# Patient Record
Sex: FEMALE | Race: WHITE | Employment: OTHER | URBAN - METROPOLITAN AREA
[De-identification: names, ages, dates, MRNs, and addresses within clinical notes are randomized per-mention and may not be internally consistent; named-entity substitution may affect disease eponyms.]

---

## 2017-01-23 DIAGNOSIS — M19.90 ARTHRITIS: ICD-10-CM

## 2017-01-23 DIAGNOSIS — F41.9 ANXIETY: ICD-10-CM

## 2017-01-23 RX ORDER — LORAZEPAM 1 MG/1
1 TABLET ORAL EVERY 8 HOURS PRN
Qty: 60 TABLET | Refills: 0 | Status: SHIPPED | OUTPATIENT
Start: 2017-01-23 | End: 2017-02-28 | Stop reason: SDUPTHER

## 2017-01-23 RX ORDER — TRAMADOL HYDROCHLORIDE 50 MG/1
50 TABLET ORAL 2 TIMES DAILY PRN
Qty: 60 TABLET | Refills: 0 | Status: SHIPPED | OUTPATIENT
Start: 2017-01-23 | End: 2017-02-28 | Stop reason: SDUPTHER

## 2017-02-05 ENCOUNTER — HOSPITAL ENCOUNTER (OUTPATIENT)
Age: 77
Setting detail: OBSERVATION
Discharge: HOME | End: 2017-02-06
Attending: EMERGENCY MEDICINE | Admitting: INTERNAL MEDICINE
Payer: MEDICARE

## 2017-02-05 ENCOUNTER — APPOINTMENT (OUTPATIENT)
Dept: CT IMAGING | Age: 77
End: 2017-02-05
Payer: MEDICARE

## 2017-02-05 ENCOUNTER — CARE COORDINATOR VISIT (OUTPATIENT)
Dept: CASE MANAGEMENT | Age: 77
End: 2017-02-05

## 2017-02-05 DIAGNOSIS — K52.9 COLITIS: Primary | ICD-10-CM

## 2017-02-05 LAB
ABSOLUTE EOS #: 0 K/UL (ref 0–0.4)
ABSOLUTE LYMPH #: 1.75 K/UL (ref 1–4.8)
ABSOLUTE MONO #: 0.88 K/UL (ref 0.1–1.3)
ALBUMIN SERPL-MCNC: 4.2 G/DL (ref 3.5–5.2)
ALBUMIN/GLOBULIN RATIO: ABNORMAL (ref 1–2.5)
ALP BLD-CCNC: 59 U/L (ref 35–104)
ALT SERPL-CCNC: 11 U/L (ref 5–33)
ANION GAP SERPL CALCULATED.3IONS-SCNC: 13 MMOL/L (ref 9–17)
AST SERPL-CCNC: 14 U/L
BASOPHILS # BLD: 0 % (ref 0–2)
BASOPHILS ABSOLUTE: 0 K/UL (ref 0–0.2)
BILIRUB SERPL-MCNC: 0.63 MG/DL (ref 0.3–1.2)
BILIRUBIN URINE: NEGATIVE
BUN BLDV-MCNC: 11 MG/DL (ref 8–23)
BUN/CREAT BLD: ABNORMAL (ref 9–20)
CALCIUM SERPL-MCNC: 9.5 MG/DL (ref 8.6–10.4)
CHLORIDE BLD-SCNC: 102 MMOL/L (ref 98–107)
CO2: 24 MMOL/L (ref 20–31)
COLOR: YELLOW
COMMENT UA: NORMAL
CREAT SERPL-MCNC: 0.57 MG/DL (ref 0.5–0.9)
DIFFERENTIAL TYPE: ABNORMAL
EOSINOPHILS RELATIVE PERCENT: 0 % (ref 0–4)
GFR AFRICAN AMERICAN: >60 ML/MIN
GFR NON-AFRICAN AMERICAN: >60 ML/MIN
GFR SERPL CREATININE-BSD FRML MDRD: ABNORMAL ML/MIN/{1.73_M2}
GFR SERPL CREATININE-BSD FRML MDRD: ABNORMAL ML/MIN/{1.73_M2}
GLUCOSE BLD-MCNC: 123 MG/DL (ref 70–99)
GLUCOSE URINE: NEGATIVE
HCT VFR BLD CALC: 32.9 % (ref 36–46)
HEMOGLOBIN: 10 G/DL (ref 12–16)
KETONES, URINE: NEGATIVE
LACTIC ACID: 1 MMOL/L (ref 0.5–2.2)
LEUKOCYTE ESTERASE, URINE: NEGATIVE
LIPASE: 21 U/L (ref 13–60)
LYMPHOCYTES # BLD: 12 % (ref 24–44)
MCH RBC QN AUTO: 21.6 PG (ref 26–34)
MCHC RBC AUTO-ENTMCNC: 30.4 G/DL (ref 31–37)
MCV RBC AUTO: 70.9 FL (ref 80–100)
MONOCYTES # BLD: 6 % (ref 1–7)
MORPHOLOGY: NORMAL
NITRITE, URINE: NEGATIVE
PDW BLD-RTO: 19.2 % (ref 11.5–14.9)
PH UA: 6.5 (ref 5–8)
PLATELET # BLD: 330 K/UL (ref 150–450)
PLATELET ESTIMATE: ABNORMAL
PMV BLD AUTO: 7.7 FL (ref 6–12)
POTASSIUM SERPL-SCNC: 3.9 MMOL/L (ref 3.7–5.3)
PROTEIN UA: NEGATIVE
RBC # BLD: 4.64 M/UL (ref 4–5.2)
RBC # BLD: ABNORMAL 10*6/UL
SEG NEUTROPHILS: 82 % (ref 36–66)
SEGMENTED NEUTROPHILS ABSOLUTE COUNT: 11.97 K/UL (ref 1.3–9.1)
SODIUM BLD-SCNC: 139 MMOL/L (ref 135–144)
SPECIFIC GRAVITY UA: 1 (ref 1–1.03)
TOTAL PROTEIN: 7.7 G/DL (ref 6.4–8.3)
TURBIDITY: CLEAR
URINE HGB: NEGATIVE
UROBILINOGEN, URINE: NORMAL
WBC # BLD: 14.6 K/UL (ref 3.5–11)
WBC # BLD: ABNORMAL 10*3/UL

## 2017-02-05 PROCEDURE — 80053 COMPREHEN METABOLIC PANEL: CPT

## 2017-02-05 PROCEDURE — 99285 EMERGENCY DEPT VISIT HI MDM: CPT

## 2017-02-05 PROCEDURE — 2580000003 HC RX 258: Performed by: STUDENT IN AN ORGANIZED HEALTH CARE EDUCATION/TRAINING PROGRAM

## 2017-02-05 PROCEDURE — 36415 COLL VENOUS BLD VENIPUNCTURE: CPT

## 2017-02-05 PROCEDURE — G0378 HOSPITAL OBSERVATION PER HR: HCPCS

## 2017-02-05 PROCEDURE — 2580000003 HC RX 258: Performed by: EMERGENCY MEDICINE

## 2017-02-05 PROCEDURE — 99220 PR INITIAL OBSERVATION CARE/DAY 70 MINUTES: CPT | Performed by: INTERNAL MEDICINE

## 2017-02-05 PROCEDURE — 96367 TX/PROPH/DG ADDL SEQ IV INF: CPT

## 2017-02-05 PROCEDURE — 83690 ASSAY OF LIPASE: CPT

## 2017-02-05 PROCEDURE — 96366 THER/PROPH/DIAG IV INF ADDON: CPT

## 2017-02-05 PROCEDURE — 6360000002 HC RX W HCPCS: Performed by: EMERGENCY MEDICINE

## 2017-02-05 PROCEDURE — 81003 URINALYSIS AUTO W/O SCOPE: CPT

## 2017-02-05 PROCEDURE — 6360000004 HC RX CONTRAST MEDICATION: Performed by: EMERGENCY MEDICINE

## 2017-02-05 PROCEDURE — 2500000003 HC RX 250 WO HCPCS: Performed by: FAMILY MEDICINE

## 2017-02-05 PROCEDURE — 74177 CT ABD & PELVIS W/CONTRAST: CPT | Performed by: RADIOLOGY

## 2017-02-05 PROCEDURE — 6370000000 HC RX 637 (ALT 250 FOR IP): Performed by: STUDENT IN AN ORGANIZED HEALTH CARE EDUCATION/TRAINING PROGRAM

## 2017-02-05 PROCEDURE — 94664 DEMO&/EVAL PT USE INHALER: CPT

## 2017-02-05 PROCEDURE — 83605 ASSAY OF LACTIC ACID: CPT

## 2017-02-05 PROCEDURE — 96375 TX/PRO/DX INJ NEW DRUG ADDON: CPT

## 2017-02-05 PROCEDURE — 85025 COMPLETE CBC W/AUTO DIFF WBC: CPT

## 2017-02-05 PROCEDURE — 74177 CT ABD & PELVIS W/CONTRAST: CPT

## 2017-02-05 PROCEDURE — 1200000000 HC SEMI PRIVATE

## 2017-02-05 PROCEDURE — 96365 THER/PROPH/DIAG IV INF INIT: CPT

## 2017-02-05 RX ORDER — SODIUM CHLORIDE 9 MG/ML
INJECTION, SOLUTION INTRAVENOUS CONTINUOUS
Status: DISCONTINUED | OUTPATIENT
Start: 2017-02-05 | End: 2017-02-06 | Stop reason: HOSPADM

## 2017-02-05 RX ORDER — PANTOPRAZOLE SODIUM 40 MG/1
40 TABLET, DELAYED RELEASE ORAL
Status: DISCONTINUED | OUTPATIENT
Start: 2017-02-06 | End: 2017-02-06 | Stop reason: HOSPADM

## 2017-02-05 RX ORDER — MORPHINE SULFATE 4 MG/ML
4 INJECTION, SOLUTION INTRAMUSCULAR; INTRAVENOUS ONCE
Status: COMPLETED | OUTPATIENT
Start: 2017-02-05 | End: 2017-02-05

## 2017-02-05 RX ORDER — ONDANSETRON 2 MG/ML
4 INJECTION INTRAMUSCULAR; INTRAVENOUS ONCE
Status: COMPLETED | OUTPATIENT
Start: 2017-02-05 | End: 2017-02-05

## 2017-02-05 RX ORDER — CIPROFLOXACIN 2 MG/ML
400 INJECTION, SOLUTION INTRAVENOUS EVERY 12 HOURS
Status: DISCONTINUED | OUTPATIENT
Start: 2017-02-05 | End: 2017-02-05

## 2017-02-05 RX ORDER — LORAZEPAM 1 MG/1
1 TABLET ORAL EVERY 8 HOURS PRN
Status: DISCONTINUED | OUTPATIENT
Start: 2017-02-05 | End: 2017-02-06 | Stop reason: HOSPADM

## 2017-02-05 RX ORDER — MELOXICAM 15 MG/1
15 TABLET ORAL DAILY
Status: DISCONTINUED | OUTPATIENT
Start: 2017-02-05 | End: 2017-02-06 | Stop reason: HOSPADM

## 2017-02-05 RX ORDER — BISACODYL 10 MG
10 SUPPOSITORY, RECTAL RECTAL DAILY PRN
Status: DISCONTINUED | OUTPATIENT
Start: 2017-02-05 | End: 2017-02-06 | Stop reason: HOSPADM

## 2017-02-05 RX ORDER — CIPROFLOXACIN 2 MG/ML
400 INJECTION, SOLUTION INTRAVENOUS EVERY 12 HOURS
Status: DISCONTINUED | OUTPATIENT
Start: 2017-02-06 | End: 2017-02-06 | Stop reason: HOSPADM

## 2017-02-05 RX ORDER — ALBUTEROL SULFATE 90 UG/1
2 AEROSOL, METERED RESPIRATORY (INHALATION) EVERY 6 HOURS PRN
Status: DISCONTINUED | OUTPATIENT
Start: 2017-02-05 | End: 2017-02-06 | Stop reason: HOSPADM

## 2017-02-05 RX ORDER — POTASSIUM CHLORIDE 7.45 MG/ML
10 INJECTION INTRAVENOUS PRN
Status: DISCONTINUED | OUTPATIENT
Start: 2017-02-05 | End: 2017-02-06 | Stop reason: HOSPADM

## 2017-02-05 RX ORDER — 0.9 % SODIUM CHLORIDE 0.9 %
1000 INTRAVENOUS SOLUTION INTRAVENOUS ONCE
Status: COMPLETED | OUTPATIENT
Start: 2017-02-05 | End: 2017-02-05

## 2017-02-05 RX ORDER — POTASSIUM CHLORIDE 20MEQ/15ML
40 LIQUID (ML) ORAL PRN
Status: DISCONTINUED | OUTPATIENT
Start: 2017-02-05 | End: 2017-02-06 | Stop reason: HOSPADM

## 2017-02-05 RX ORDER — HYDROCODONE BITARTRATE AND ACETAMINOPHEN 5; 325 MG/1; MG/1
1 TABLET ORAL EVERY 4 HOURS PRN
Status: DISCONTINUED | OUTPATIENT
Start: 2017-02-05 | End: 2017-02-06 | Stop reason: HOSPADM

## 2017-02-05 RX ORDER — ONDANSETRON 2 MG/ML
4 INJECTION INTRAMUSCULAR; INTRAVENOUS EVERY 6 HOURS PRN
Status: DISCONTINUED | OUTPATIENT
Start: 2017-02-05 | End: 2017-02-06 | Stop reason: HOSPADM

## 2017-02-05 RX ORDER — LISINOPRIL 10 MG/1
10 TABLET ORAL DAILY
Status: DISCONTINUED | OUTPATIENT
Start: 2017-02-05 | End: 2017-02-06 | Stop reason: HOSPADM

## 2017-02-05 RX ORDER — CIPROFLOXACIN 2 MG/ML
400 INJECTION, SOLUTION INTRAVENOUS ONCE
Status: COMPLETED | OUTPATIENT
Start: 2017-02-05 | End: 2017-02-05

## 2017-02-05 RX ORDER — SODIUM CHLORIDE 0.9 % (FLUSH) 0.9 %
10 SYRINGE (ML) INJECTION PRN
Status: DISCONTINUED | OUTPATIENT
Start: 2017-02-05 | End: 2017-02-06 | Stop reason: HOSPADM

## 2017-02-05 RX ORDER — FUROSEMIDE 40 MG/1
40 TABLET ORAL
Status: DISCONTINUED | OUTPATIENT
Start: 2017-02-06 | End: 2017-02-06 | Stop reason: HOSPADM

## 2017-02-05 RX ORDER — CIPROFLOXACIN 2 MG/ML
400 INJECTION, SOLUTION INTRAVENOUS ONCE
Status: DISCONTINUED | OUTPATIENT
Start: 2017-02-05 | End: 2017-02-05 | Stop reason: SDUPTHER

## 2017-02-05 RX ORDER — LISINOPRIL 10 MG/1
1 TABLET ORAL DAILY
Status: DISCONTINUED | OUTPATIENT
Start: 2017-02-05 | End: 2017-02-05 | Stop reason: CLARIF

## 2017-02-05 RX ORDER — SODIUM CHLORIDE 0.9 % (FLUSH) 0.9 %
10 SYRINGE (ML) INJECTION EVERY 12 HOURS SCHEDULED
Status: DISCONTINUED | OUTPATIENT
Start: 2017-02-05 | End: 2017-02-06 | Stop reason: HOSPADM

## 2017-02-05 RX ORDER — POTASSIUM CHLORIDE 20 MEQ/1
40 TABLET, EXTENDED RELEASE ORAL PRN
Status: DISCONTINUED | OUTPATIENT
Start: 2017-02-05 | End: 2017-02-06 | Stop reason: HOSPADM

## 2017-02-05 RX ORDER — ACETAMINOPHEN 325 MG/1
650 TABLET ORAL EVERY 4 HOURS PRN
Status: DISCONTINUED | OUTPATIENT
Start: 2017-02-05 | End: 2017-02-06 | Stop reason: HOSPADM

## 2017-02-05 RX ADMIN — ACETAMINOPHEN 650 MG: 325 TABLET ORAL at 15:52

## 2017-02-05 RX ADMIN — SODIUM CHLORIDE: 9 INJECTION, SOLUTION INTRAVENOUS at 15:42

## 2017-02-05 RX ADMIN — CIPROFLOXACIN 400 MG: 2 INJECTION, SOLUTION INTRAVENOUS at 14:08

## 2017-02-05 RX ADMIN — IOHEXOL 130 ML: 350 INJECTION, SOLUTION INTRAVENOUS at 12:58

## 2017-02-05 RX ADMIN — HYDROCODONE BITARTRATE AND ACETAMINOPHEN 1 TABLET: 5; 325 TABLET ORAL at 23:37

## 2017-02-05 RX ADMIN — METRONIDAZOLE 500 MG: 500 INJECTION, SOLUTION INTRAVENOUS at 15:55

## 2017-02-05 RX ADMIN — Medication 10 ML: at 21:01

## 2017-02-05 RX ADMIN — ONDANSETRON 4 MG: 2 INJECTION, SOLUTION INTRAMUSCULAR; INTRAVENOUS at 12:02

## 2017-02-05 RX ADMIN — SODIUM CHLORIDE 1000 ML: 9 INJECTION, SOLUTION INTRAVENOUS at 12:02

## 2017-02-05 RX ADMIN — HYDROCODONE BITARTRATE AND ACETAMINOPHEN 1 TABLET: 5; 325 TABLET ORAL at 15:52

## 2017-02-05 RX ADMIN — MORPHINE SULFATE 4 MG: 4 INJECTION, SOLUTION INTRAMUSCULAR; INTRAVENOUS at 12:02

## 2017-02-05 ASSESSMENT — ENCOUNTER SYMPTOMS
SHORTNESS OF BREATH: 0
RESPIRATORY NEGATIVE: 1
NAUSEA: 1
COUGH: 0
DIARRHEA: 1
EYES NEGATIVE: 1
BACK PAIN: 0
ABDOMINAL PAIN: 1
VOMITING: 1

## 2017-02-05 ASSESSMENT — PAIN SCALES - GENERAL
PAINLEVEL_OUTOF10: 10
PAINLEVEL_OUTOF10: 5
PAINLEVEL_OUTOF10: 3
PAINLEVEL_OUTOF10: 6
PAINLEVEL_OUTOF10: 2

## 2017-02-05 ASSESSMENT — PAIN DESCRIPTION - DESCRIPTORS: DESCRIPTORS: ACHING;CRAMPING

## 2017-02-05 ASSESSMENT — PAIN DESCRIPTION - LOCATION: LOCATION: ABDOMEN

## 2017-02-06 VITALS
DIASTOLIC BLOOD PRESSURE: 59 MMHG | BODY MASS INDEX: 27.32 KG/M2 | HEIGHT: 66 IN | SYSTOLIC BLOOD PRESSURE: 138 MMHG | TEMPERATURE: 98 F | OXYGEN SATURATION: 99 % | WEIGHT: 170 LBS | RESPIRATION RATE: 16 BRPM | HEART RATE: 82 BPM

## 2017-02-06 LAB
ANION GAP SERPL CALCULATED.3IONS-SCNC: 14 MMOL/L (ref 9–17)
BUN BLDV-MCNC: 8 MG/DL (ref 8–23)
BUN/CREAT BLD: ABNORMAL (ref 9–20)
C DIFFICILE TOXINS, PCR: NORMAL
CALCIUM SERPL-MCNC: 8.6 MG/DL (ref 8.6–10.4)
CAMPYLOBACTER PCR: NORMAL
CHLORIDE BLD-SCNC: 107 MMOL/L (ref 98–107)
CO2: 21 MMOL/L (ref 20–31)
CREAT SERPL-MCNC: 0.52 MG/DL (ref 0.5–0.9)
DATE, STOOL #1: NORMAL
DATE, STOOL #2: NORMAL
DATE, STOOL #3: NORMAL
GFR AFRICAN AMERICAN: >60 ML/MIN
GFR NON-AFRICAN AMERICAN: >60 ML/MIN
GFR SERPL CREATININE-BSD FRML MDRD: ABNORMAL ML/MIN/{1.73_M2}
GFR SERPL CREATININE-BSD FRML MDRD: ABNORMAL ML/MIN/{1.73_M2}
GLUCOSE BLD-MCNC: 121 MG/DL (ref 70–99)
HCT VFR BLD CALC: 28.4 % (ref 36–46)
HEMOCCULT SP1 STL QL: POSITIVE
HEMOCCULT SP2 STL QL: NORMAL
HEMOCCULT SP3 STL QL: NORMAL
HEMOGLOBIN: 8.6 G/DL (ref 12–16)
INR BLD: 1
LACTOFERRIN, QUAL: ABNORMAL
MCH RBC QN AUTO: 22 PG (ref 26–34)
MCHC RBC AUTO-ENTMCNC: 30.4 G/DL (ref 31–37)
MCV RBC AUTO: 72.3 FL (ref 80–100)
PDW BLD-RTO: 19.4 % (ref 11.5–14.9)
PLATELET # BLD: 271 K/UL (ref 150–450)
PMV BLD AUTO: 8.1 FL (ref 6–12)
POTASSIUM SERPL-SCNC: 4 MMOL/L (ref 3.7–5.3)
PROTHROMBIN TIME: 11 SEC (ref 9.7–12)
RBC # BLD: 3.92 M/UL (ref 4–5.2)
SALMONELLA PCR: NORMAL
SHIGATOXIN GENE PCR: NORMAL
SHIGELLA SP PCR: NORMAL
SODIUM BLD-SCNC: 142 MMOL/L (ref 135–144)
SPECIMEN DESCRIPTION: NORMAL
SPECIMEN: NORMAL
TIME, STOOL #1: 730
TIME, STOOL #2: NORMAL
TIME, STOOL #3: NORMAL
WBC # BLD: 11.8 K/UL (ref 3.5–11)

## 2017-02-06 PROCEDURE — 80048 BASIC METABOLIC PNL TOTAL CA: CPT

## 2017-02-06 PROCEDURE — 85027 COMPLETE CBC AUTOMATED: CPT

## 2017-02-06 PROCEDURE — 87493 C DIFF AMPLIFIED PROBE: CPT

## 2017-02-06 PROCEDURE — 83630 LACTOFERRIN FECAL (QUAL): CPT

## 2017-02-06 PROCEDURE — 6370000000 HC RX 637 (ALT 250 FOR IP): Performed by: STUDENT IN AN ORGANIZED HEALTH CARE EDUCATION/TRAINING PROGRAM

## 2017-02-06 PROCEDURE — 96375 TX/PRO/DX INJ NEW DRUG ADDON: CPT

## 2017-02-06 PROCEDURE — 2500000003 HC RX 250 WO HCPCS: Performed by: FAMILY MEDICINE

## 2017-02-06 PROCEDURE — 6370000000 HC RX 637 (ALT 250 FOR IP): Performed by: INTERNAL MEDICINE

## 2017-02-06 PROCEDURE — 36415 COLL VENOUS BLD VENIPUNCTURE: CPT

## 2017-02-06 PROCEDURE — 96376 TX/PRO/DX INJ SAME DRUG ADON: CPT

## 2017-02-06 PROCEDURE — 2580000003 HC RX 258: Performed by: STUDENT IN AN ORGANIZED HEALTH CARE EDUCATION/TRAINING PROGRAM

## 2017-02-06 PROCEDURE — 6360000002 HC RX W HCPCS: Performed by: FAMILY MEDICINE

## 2017-02-06 PROCEDURE — 99239 HOSP IP/OBS DSCHRG MGMT >30: CPT | Performed by: INTERNAL MEDICINE

## 2017-02-06 PROCEDURE — 82272 OCCULT BLD FECES 1-3 TESTS: CPT

## 2017-02-06 PROCEDURE — 6360000002 HC RX W HCPCS: Performed by: STUDENT IN AN ORGANIZED HEALTH CARE EDUCATION/TRAINING PROGRAM

## 2017-02-06 PROCEDURE — G0378 HOSPITAL OBSERVATION PER HR: HCPCS

## 2017-02-06 PROCEDURE — 96374 THER/PROPH/DIAG INJ IV PUSH: CPT

## 2017-02-06 PROCEDURE — 87505 NFCT AGENT DETECTION GI: CPT

## 2017-02-06 PROCEDURE — 96366 THER/PROPH/DIAG IV INF ADDON: CPT

## 2017-02-06 PROCEDURE — 85610 PROTHROMBIN TIME: CPT

## 2017-02-06 PROCEDURE — 99215 OFFICE O/P EST HI 40 MIN: CPT | Performed by: INTERNAL MEDICINE

## 2017-02-06 RX ORDER — CIPROFLOXACIN 500 MG/1
500 TABLET, FILM COATED ORAL 2 TIMES DAILY
Qty: 6 TABLET | Refills: 0 | Status: SHIPPED | OUTPATIENT
Start: 2017-02-06 | End: 2017-02-09

## 2017-02-06 RX ORDER — METRONIDAZOLE 500 MG/1
500 TABLET ORAL 3 TIMES DAILY
Qty: 9 TABLET | Refills: 0 | Status: SHIPPED | OUTPATIENT
Start: 2017-02-06 | End: 2017-02-09

## 2017-02-06 RX ADMIN — LISINOPRIL 10 MG: 10 TABLET ORAL at 09:16

## 2017-02-06 RX ADMIN — SODIUM CHLORIDE: 9 INJECTION, SOLUTION INTRAVENOUS at 00:44

## 2017-02-06 RX ADMIN — LORAZEPAM 1 MG: 1 TABLET ORAL at 09:29

## 2017-02-06 RX ADMIN — METRONIDAZOLE 500 MG: 500 INJECTION, SOLUTION INTRAVENOUS at 09:22

## 2017-02-06 RX ADMIN — PANTOPRAZOLE SODIUM 40 MG: 40 TABLET, DELAYED RELEASE ORAL at 06:31

## 2017-02-06 RX ADMIN — LORAZEPAM 1 MG: 1 TABLET ORAL at 00:44

## 2017-02-06 RX ADMIN — METRONIDAZOLE 500 MG: 500 INJECTION, SOLUTION INTRAVENOUS at 01:00

## 2017-02-06 RX ADMIN — CIPROFLOXACIN 400 MG: 2 INJECTION, SOLUTION INTRAVENOUS at 13:44

## 2017-02-06 RX ADMIN — ONDANSETRON 4 MG: 2 INJECTION INTRAMUSCULAR; INTRAVENOUS at 09:16

## 2017-02-06 RX ADMIN — SODIUM CHLORIDE: 9 INJECTION, SOLUTION INTRAVENOUS at 09:34

## 2017-02-06 RX ADMIN — CIPROFLOXACIN 400 MG: 2 INJECTION, SOLUTION INTRAVENOUS at 02:11

## 2017-02-06 RX ADMIN — MELOXICAM 15 MG: 15 TABLET ORAL at 12:01

## 2017-02-06 RX ADMIN — METRONIDAZOLE 500 MG: 500 INJECTION, SOLUTION INTRAVENOUS at 17:26

## 2017-02-06 ASSESSMENT — PAIN SCALES - GENERAL
PAINLEVEL_OUTOF10: 2
PAINLEVEL_OUTOF10: 0

## 2017-02-07 ENCOUNTER — CARE COORDINATION (OUTPATIENT)
Dept: CASE MANAGEMENT | Age: 77
End: 2017-02-07

## 2017-02-07 ENCOUNTER — TELEPHONE (OUTPATIENT)
Dept: PHARMACY | Facility: CLINIC | Age: 77
End: 2017-02-07

## 2017-02-08 ENCOUNTER — CARE COORDINATION (OUTPATIENT)
Dept: CASE MANAGEMENT | Age: 77
End: 2017-02-08

## 2017-02-15 ENCOUNTER — OFFICE VISIT (OUTPATIENT)
Dept: INTERNAL MEDICINE | Facility: CLINIC | Age: 77
End: 2017-02-15

## 2017-02-15 ENCOUNTER — HOSPITAL ENCOUNTER (OUTPATIENT)
Age: 77
Setting detail: SPECIMEN
Discharge: HOME | End: 2017-02-15
Payer: MEDICARE

## 2017-02-15 VITALS
RESPIRATION RATE: 14 BRPM | SYSTOLIC BLOOD PRESSURE: 136 MMHG | BODY MASS INDEX: 27 KG/M2 | DIASTOLIC BLOOD PRESSURE: 84 MMHG | WEIGHT: 168 LBS | HEIGHT: 66 IN

## 2017-02-15 DIAGNOSIS — I10 ESSENTIAL HYPERTENSION: ICD-10-CM

## 2017-02-15 DIAGNOSIS — A05.9 FOOD POISONING: Primary | ICD-10-CM

## 2017-02-15 DIAGNOSIS — K52.9 COLITIS: ICD-10-CM

## 2017-02-15 LAB
BILIRUBIN URINE: NEGATIVE
COLOR: YELLOW
COMMENT UA: NORMAL
GLUCOSE URINE: NEGATIVE
KETONES, URINE: NEGATIVE
LEUKOCYTE ESTERASE, URINE: NEGATIVE
NITRITE, URINE: NEGATIVE
PH UA: 7 (ref 5–8)
PROTEIN UA: NEGATIVE
SPECIFIC GRAVITY UA: 1.01 (ref 1–1.03)
TURBIDITY: CLEAR
URINE HGB: NEGATIVE
UROBILINOGEN, URINE: NORMAL

## 2017-02-15 PROCEDURE — 99495 TRANSJ CARE MGMT MOD F2F 14D: CPT | Performed by: INTERNAL MEDICINE

## 2017-02-15 RX ORDER — OMEPRAZOLE 20 MG/1
20 CAPSULE, DELAYED RELEASE ORAL DAILY
Qty: 90 CAPSULE | Refills: 2 | Status: SHIPPED | OUTPATIENT
Start: 2017-02-15 | End: 2018-01-26 | Stop reason: SDUPTHER

## 2017-02-15 ASSESSMENT — ENCOUNTER SYMPTOMS
EYE PAIN: 0
EYE DISCHARGE: 0
ABDOMINAL PAIN: 1
SORE THROAT: 0
DIARRHEA: 1
VOMITING: 1
BLOOD IN STOOL: 0
SINUS PRESSURE: 0
CONSTIPATION: 0
WHEEZING: 0
CHEST TIGHTNESS: 0
COUGH: 0
ANAL BLEEDING: 0
BACK PAIN: 0
RHINORRHEA: 0
SHORTNESS OF BREATH: 0
TROUBLE SWALLOWING: 0

## 2017-02-22 ENCOUNTER — HOSPITAL ENCOUNTER (OUTPATIENT)
Age: 77
Setting detail: SPECIMEN
Discharge: HOME | End: 2017-02-22
Payer: MEDICARE

## 2017-02-22 ENCOUNTER — OFFICE VISIT (OUTPATIENT)
Dept: INTERNAL MEDICINE | Facility: CLINIC | Age: 77
End: 2017-02-22

## 2017-02-22 VITALS
SYSTOLIC BLOOD PRESSURE: 120 MMHG | HEIGHT: 66 IN | BODY MASS INDEX: 27.32 KG/M2 | WEIGHT: 170 LBS | DIASTOLIC BLOOD PRESSURE: 60 MMHG

## 2017-02-22 DIAGNOSIS — R30.0 DYSURIA: Primary | ICD-10-CM

## 2017-02-22 DIAGNOSIS — R30.0 DYSURIA: ICD-10-CM

## 2017-02-22 DIAGNOSIS — N30.00 ACUTE CYSTITIS WITHOUT HEMATURIA: ICD-10-CM

## 2017-02-22 PROCEDURE — 1090F PRES/ABSN URINE INCON ASSESS: CPT | Performed by: NURSE PRACTITIONER

## 2017-02-22 PROCEDURE — 1123F ACP DISCUSS/DSCN MKR DOCD: CPT | Performed by: NURSE PRACTITIONER

## 2017-02-22 PROCEDURE — G8484 FLU IMMUNIZE NO ADMIN: HCPCS | Performed by: NURSE PRACTITIONER

## 2017-02-22 PROCEDURE — G8427 DOCREV CUR MEDS BY ELIG CLIN: HCPCS | Performed by: NURSE PRACTITIONER

## 2017-02-22 PROCEDURE — 99213 OFFICE O/P EST LOW 20 MIN: CPT | Performed by: NURSE PRACTITIONER

## 2017-02-22 PROCEDURE — 3017F COLORECTAL CA SCREEN DOC REV: CPT | Performed by: NURSE PRACTITIONER

## 2017-02-22 PROCEDURE — 3014F SCREEN MAMMO DOC REV: CPT | Performed by: NURSE PRACTITIONER

## 2017-02-22 PROCEDURE — G8400 PT W/DXA NO RESULTS DOC: HCPCS | Performed by: NURSE PRACTITIONER

## 2017-02-22 PROCEDURE — G8420 CALC BMI NORM PARAMETERS: HCPCS | Performed by: NURSE PRACTITIONER

## 2017-02-22 PROCEDURE — 1036F TOBACCO NON-USER: CPT | Performed by: NURSE PRACTITIONER

## 2017-02-22 PROCEDURE — 4040F PNEUMOC VAC/ADMIN/RCVD: CPT | Performed by: NURSE PRACTITIONER

## 2017-02-22 RX ORDER — CIPROFLOXACIN 500 MG/1
500 TABLET, FILM COATED ORAL 2 TIMES DAILY
Qty: 10 TABLET | Refills: 0 | Status: SHIPPED | OUTPATIENT
Start: 2017-02-22 | End: 2017-02-24 | Stop reason: ALTCHOICE

## 2017-02-22 RX ORDER — FUROSEMIDE 40 MG/1
TABLET ORAL
COMMUNITY
Start: 2017-02-21 | End: 2017-08-19 | Stop reason: SDUPTHER

## 2017-02-23 LAB
-: NORMAL
AMORPHOUS: NORMAL
BACTERIA: NORMAL
BILIRUBIN URINE: NEGATIVE
CASTS UA: NORMAL /LPF (ref 0–8)
COLOR: YELLOW
COMMENT UA: ABNORMAL
CRYSTALS, UA: NORMAL /HPF
EPITHELIAL CELLS UA: NORMAL /HPF (ref 0–5)
GLUCOSE URINE: NEGATIVE
KETONES, URINE: NEGATIVE
LEUKOCYTE ESTERASE, URINE: ABNORMAL
MUCUS: NORMAL
NITRITE, URINE: NEGATIVE
OTHER OBSERVATIONS UA: NORMAL
PH UA: 6.5 (ref 5–8)
PROTEIN UA: ABNORMAL
RBC UA: NORMAL /HPF (ref 0–4)
RENAL EPITHELIAL, UA: NORMAL /HPF
SPECIFIC GRAVITY UA: 1.01 (ref 1–1.03)
TRICHOMONAS: NORMAL
TURBIDITY: ABNORMAL
URINE HGB: ABNORMAL
UROBILINOGEN, URINE: NORMAL
WBC UA: NORMAL /HPF (ref 0–5)
YEAST: NORMAL

## 2017-02-24 ENCOUNTER — TELEPHONE (OUTPATIENT)
Dept: FAMILY MEDICINE CLINIC | Facility: CLINIC | Age: 77
End: 2017-02-24

## 2017-02-24 LAB
CULTURE: ABNORMAL
CULTURE: ABNORMAL
Lab: ABNORMAL
ORGANISM: ABNORMAL
SPECIMEN DESCRIPTION: ABNORMAL
STATUS: ABNORMAL

## 2017-02-24 RX ORDER — SULFAMETHOXAZOLE AND TRIMETHOPRIM 800; 160 MG/1; MG/1
1 TABLET ORAL 2 TIMES DAILY
Qty: 10 TABLET | Refills: 0 | Status: SHIPPED | OUTPATIENT
Start: 2017-02-24 | End: 2017-05-03 | Stop reason: SDUPTHER

## 2017-02-28 DIAGNOSIS — M19.90 ARTHRITIS: ICD-10-CM

## 2017-02-28 DIAGNOSIS — F41.9 ANXIETY: ICD-10-CM

## 2017-02-28 RX ORDER — TRAMADOL HYDROCHLORIDE 50 MG/1
50 TABLET ORAL 2 TIMES DAILY PRN
Qty: 60 TABLET | Refills: 0 | Status: SHIPPED | OUTPATIENT
Start: 2017-02-28 | End: 2017-04-03 | Stop reason: SDUPTHER

## 2017-02-28 RX ORDER — LORAZEPAM 1 MG/1
1 TABLET ORAL EVERY 8 HOURS PRN
Qty: 60 TABLET | Refills: 0 | Status: SHIPPED | OUTPATIENT
Start: 2017-02-28 | End: 2017-04-03 | Stop reason: SDUPTHER

## 2017-03-08 ENCOUNTER — TELEPHONE (OUTPATIENT)
Dept: INTERNAL MEDICINE | Facility: CLINIC | Age: 77
End: 2017-03-08

## 2017-03-23 ENCOUNTER — TELEPHONE (OUTPATIENT)
Dept: INTERNAL MEDICINE CLINIC | Age: 77
End: 2017-03-23

## 2017-04-03 DIAGNOSIS — F41.9 ANXIETY: ICD-10-CM

## 2017-04-03 DIAGNOSIS — M19.90 ARTHRITIS: ICD-10-CM

## 2017-04-03 RX ORDER — TRAMADOL HYDROCHLORIDE 50 MG/1
50 TABLET ORAL 2 TIMES DAILY PRN
Qty: 60 TABLET | Refills: 0 | Status: SHIPPED | OUTPATIENT
Start: 2017-04-03 | End: 2017-05-02 | Stop reason: SDUPTHER

## 2017-04-03 RX ORDER — LORAZEPAM 1 MG/1
1 TABLET ORAL EVERY 8 HOURS PRN
Qty: 60 TABLET | Refills: 0 | Status: SHIPPED | OUTPATIENT
Start: 2017-04-03 | End: 2017-05-02 | Stop reason: SDUPTHER

## 2017-04-19 DIAGNOSIS — I10 ESSENTIAL HYPERTENSION: ICD-10-CM

## 2017-04-20 RX ORDER — LISINOPRIL 10 MG/1
TABLET ORAL
Qty: 30 TABLET | Refills: 3 | Status: SHIPPED | OUTPATIENT
Start: 2017-04-20 | End: 2017-09-07 | Stop reason: SDUPTHER

## 2017-05-02 DIAGNOSIS — M19.90 ARTHRITIS: ICD-10-CM

## 2017-05-02 DIAGNOSIS — F41.9 ANXIETY: ICD-10-CM

## 2017-05-02 RX ORDER — LORAZEPAM 1 MG/1
1 TABLET ORAL EVERY 8 HOURS PRN
Qty: 60 TABLET | Refills: 0 | Status: SHIPPED | OUTPATIENT
Start: 2017-05-02 | End: 2017-05-24 | Stop reason: SDUPTHER

## 2017-05-02 RX ORDER — TRAMADOL HYDROCHLORIDE 50 MG/1
50 TABLET ORAL 2 TIMES DAILY PRN
Qty: 60 TABLET | Refills: 0 | Status: SHIPPED | OUTPATIENT
Start: 2017-05-02 | End: 2017-05-24 | Stop reason: SDUPTHER

## 2017-05-03 ENCOUNTER — HOSPITAL ENCOUNTER (OUTPATIENT)
Age: 77
Setting detail: SPECIMEN
Discharge: HOME | End: 2017-05-03
Payer: MEDICARE

## 2017-05-03 DIAGNOSIS — N39.0 URINARY TRACT INFECTION, SITE UNSPECIFIED: Primary | ICD-10-CM

## 2017-05-03 LAB
-: ABNORMAL
AMORPHOUS: ABNORMAL
BACTERIA: ABNORMAL
BILIRUBIN URINE: NEGATIVE
CASTS UA: ABNORMAL /LPF (ref 0–2)
COLOR: ABNORMAL
COMMENT UA: ABNORMAL
CRYSTALS, UA: ABNORMAL /HPF
EPITHELIAL CELLS UA: ABNORMAL /HPF (ref 0–5)
GLUCOSE URINE: NEGATIVE
KETONES, URINE: ABNORMAL
LEUKOCYTE ESTERASE, URINE: ABNORMAL
MUCUS: ABNORMAL
NITRITE, URINE: NEGATIVE
OTHER OBSERVATIONS UA: ABNORMAL
PH UA: 5.5 (ref 5–8)
PROTEIN UA: ABNORMAL
RBC UA: ABNORMAL /HPF (ref 0–2)
RENAL EPITHELIAL, UA: ABNORMAL /HPF
SPECIFIC GRAVITY UA: 1.02 (ref 1–1.03)
TRICHOMONAS: ABNORMAL
TURBIDITY: ABNORMAL
URINE HGB: ABNORMAL
UROBILINOGEN, URINE: NORMAL
WBC UA: ABNORMAL /HPF (ref 0–5)
YEAST: ABNORMAL

## 2017-05-04 RX ORDER — SULFAMETHOXAZOLE AND TRIMETHOPRIM 800; 160 MG/1; MG/1
1 TABLET ORAL 2 TIMES DAILY
Qty: 10 TABLET | Refills: 0 | Status: SHIPPED | OUTPATIENT
Start: 2017-05-04 | End: 2017-05-09

## 2017-05-24 ENCOUNTER — HOSPITAL ENCOUNTER (OUTPATIENT)
Age: 77
Setting detail: SPECIMEN
Discharge: HOME | End: 2017-05-24
Payer: MEDICARE

## 2017-05-24 ENCOUNTER — OFFICE VISIT (OUTPATIENT)
Dept: INTERNAL MEDICINE CLINIC | Age: 77
End: 2017-05-24
Payer: MEDICARE

## 2017-05-24 VITALS
SYSTOLIC BLOOD PRESSURE: 122 MMHG | RESPIRATION RATE: 14 BRPM | WEIGHT: 168 LBS | HEIGHT: 66 IN | DIASTOLIC BLOOD PRESSURE: 66 MMHG | BODY MASS INDEX: 27 KG/M2

## 2017-05-24 DIAGNOSIS — F41.9 ANXIETY: ICD-10-CM

## 2017-05-24 DIAGNOSIS — I10 ESSENTIAL HYPERTENSION: Primary | ICD-10-CM

## 2017-05-24 DIAGNOSIS — E78.2 MIXED HYPERLIPIDEMIA: ICD-10-CM

## 2017-05-24 LAB
-: NORMAL
AMORPHOUS: NORMAL
BACTERIA: NORMAL
BILIRUBIN URINE: NEGATIVE
CASTS UA: NORMAL /LPF (ref 0–8)
COLOR: YELLOW
COMMENT UA: ABNORMAL
CRYSTALS, UA: NORMAL /HPF
EPITHELIAL CELLS UA: NORMAL /HPF (ref 0–5)
GLUCOSE URINE: NEGATIVE
KETONES, URINE: NEGATIVE
LEUKOCYTE ESTERASE, URINE: ABNORMAL
MUCUS: NORMAL
NITRITE, URINE: NEGATIVE
OTHER OBSERVATIONS UA: NORMAL
PH UA: 6.5 (ref 5–8)
PROTEIN UA: NEGATIVE
RBC UA: NORMAL /HPF (ref 0–4)
RENAL EPITHELIAL, UA: NORMAL /HPF
SPECIFIC GRAVITY UA: 1.01 (ref 1–1.03)
TRICHOMONAS: NORMAL
TURBIDITY: CLEAR
URINE HGB: NEGATIVE
UROBILINOGEN, URINE: NORMAL
WBC UA: NORMAL /HPF (ref 0–5)
YEAST: NORMAL

## 2017-05-24 PROCEDURE — 99214 OFFICE O/P EST MOD 30 MIN: CPT | Performed by: INTERNAL MEDICINE

## 2017-05-24 PROCEDURE — G8427 DOCREV CUR MEDS BY ELIG CLIN: HCPCS | Performed by: INTERNAL MEDICINE

## 2017-05-24 PROCEDURE — 3017F COLORECTAL CA SCREEN DOC REV: CPT | Performed by: INTERNAL MEDICINE

## 2017-05-24 PROCEDURE — 4040F PNEUMOC VAC/ADMIN/RCVD: CPT | Performed by: INTERNAL MEDICINE

## 2017-05-24 PROCEDURE — 1123F ACP DISCUSS/DSCN MKR DOCD: CPT | Performed by: INTERNAL MEDICINE

## 2017-05-24 PROCEDURE — G8400 PT W/DXA NO RESULTS DOC: HCPCS | Performed by: INTERNAL MEDICINE

## 2017-05-24 PROCEDURE — 1090F PRES/ABSN URINE INCON ASSESS: CPT | Performed by: INTERNAL MEDICINE

## 2017-05-24 PROCEDURE — 1036F TOBACCO NON-USER: CPT | Performed by: INTERNAL MEDICINE

## 2017-05-24 PROCEDURE — 3014F SCREEN MAMMO DOC REV: CPT | Performed by: INTERNAL MEDICINE

## 2017-05-24 PROCEDURE — G8420 CALC BMI NORM PARAMETERS: HCPCS | Performed by: INTERNAL MEDICINE

## 2017-05-24 RX ORDER — TRAMADOL HYDROCHLORIDE 50 MG/1
50 TABLET ORAL 2 TIMES DAILY PRN
Qty: 60 TABLET | Refills: 0 | Status: SHIPPED | OUTPATIENT
Start: 2017-05-24 | End: 2017-06-23 | Stop reason: SDUPTHER

## 2017-05-24 RX ORDER — LORAZEPAM 1 MG/1
1 TABLET ORAL EVERY 8 HOURS PRN
Qty: 60 TABLET | Refills: 0 | Status: SHIPPED | OUTPATIENT
Start: 2017-05-24 | End: 2017-06-23 | Stop reason: SDUPTHER

## 2017-05-24 ASSESSMENT — ENCOUNTER SYMPTOMS
DIARRHEA: 1
SINUS PRESSURE: 0
EYE DISCHARGE: 0
VOMITING: 1
RHINORRHEA: 0
TROUBLE SWALLOWING: 0
ABDOMINAL PAIN: 1
CHEST TIGHTNESS: 0
BLOOD IN STOOL: 0
COUGH: 0
WHEEZING: 0
BACK PAIN: 0
SHORTNESS OF BREATH: 0
ORTHOPNEA: 0
EYE PAIN: 0
CONSTIPATION: 0
SORE THROAT: 0
ANAL BLEEDING: 0
BLURRED VISION: 0

## 2017-05-25 LAB
CULTURE: NORMAL
CULTURE: NORMAL
Lab: NORMAL
SPECIMEN DESCRIPTION: NORMAL
STATUS: NORMAL

## 2017-06-06 ENCOUNTER — TELEPHONE (OUTPATIENT)
Dept: INTERNAL MEDICINE CLINIC | Age: 77
End: 2017-06-06

## 2017-06-23 DIAGNOSIS — F41.9 ANXIETY: ICD-10-CM

## 2017-06-26 RX ORDER — TRAMADOL HYDROCHLORIDE 50 MG/1
50 TABLET ORAL 2 TIMES DAILY PRN
Qty: 60 TABLET | Refills: 0 | Status: SHIPPED | OUTPATIENT
Start: 2017-06-26 | End: 2017-07-25 | Stop reason: SDUPTHER

## 2017-06-26 RX ORDER — LORAZEPAM 1 MG/1
1 TABLET ORAL EVERY 8 HOURS PRN
Qty: 60 TABLET | Refills: 0 | Status: SHIPPED | OUTPATIENT
Start: 2017-06-26 | End: 2017-07-25 | Stop reason: SDUPTHER

## 2017-06-27 ENCOUNTER — TELEPHONE (OUTPATIENT)
Dept: INTERNAL MEDICINE CLINIC | Age: 77
End: 2017-06-27

## 2017-06-27 RX ORDER — CIPROFLOXACIN 250 MG/1
500 TABLET, FILM COATED ORAL 2 TIMES DAILY
Qty: 20 TABLET | Refills: 0 | Status: SHIPPED | OUTPATIENT
Start: 2017-06-27 | End: 2017-07-07

## 2017-07-21 ENCOUNTER — TELEPHONE (OUTPATIENT)
Dept: INTERNAL MEDICINE CLINIC | Age: 77
End: 2017-07-21

## 2017-07-25 DIAGNOSIS — Z51.81 ENCOUNTER FOR THERAPEUTIC DRUG MONITORING: Primary | ICD-10-CM

## 2017-07-25 DIAGNOSIS — F41.9 ANXIETY: ICD-10-CM

## 2017-07-25 RX ORDER — LORAZEPAM 1 MG/1
1 TABLET ORAL EVERY 8 HOURS PRN
Qty: 60 TABLET | Refills: 0 | Status: SHIPPED | OUTPATIENT
Start: 2017-07-25 | End: 2017-08-23 | Stop reason: SDUPTHER

## 2017-07-25 RX ORDER — TRAMADOL HYDROCHLORIDE 50 MG/1
50 TABLET ORAL 2 TIMES DAILY PRN
Qty: 60 TABLET | Refills: 0 | Status: SHIPPED | OUTPATIENT
Start: 2017-07-25 | End: 2017-08-23 | Stop reason: SDUPTHER

## 2017-07-26 ENCOUNTER — TELEPHONE (OUTPATIENT)
Dept: INTERNAL MEDICINE CLINIC | Age: 77
End: 2017-07-26

## 2017-07-26 ENCOUNTER — HOSPITAL ENCOUNTER (OUTPATIENT)
Age: 77
Setting detail: SPECIMEN
Discharge: HOME | End: 2017-07-26
Payer: MEDICARE

## 2017-07-26 DIAGNOSIS — Z51.81 ENCOUNTER FOR THERAPEUTIC DRUG MONITORING: ICD-10-CM

## 2017-07-27 RX ORDER — MULTIVITAMIN
1 TABLET ORAL DAILY
Qty: 30 TABLET | Refills: 3 | Status: SHIPPED | OUTPATIENT
Start: 2017-07-27 | End: 2019-01-02

## 2017-07-30 LAB
6-ACETYLMORPHINE, UR: NOT DETECTED
7-AMINOCLONAZEPAM, URINE: NOT DETECTED
ALPHA-OH-ALPRAZ, URINE: NOT DETECTED
ALPRAZOLAM, URINE: NOT DETECTED
AMPHETAMINES, URINE: NOT DETECTED
BARBITURATES, URINE: NOT DETECTED
BENZOYLECGONINE, UR: NOT DETECTED
BUPRENORPHINE URINE: NOT DETECTED
CARISOPRODOL, UR: NOT DETECTED
CLONAZEPAM, URINE: NOT DETECTED
CODEINE, URINE: NOT DETECTED
CREATININE URINE: 40.8 MG/DL (ref 20–400)
DIAZEPAM, URINE: NOT DETECTED
DRUGS EXPECTED, UR: NORMAL
EER HI RES INTERP UR: NORMAL
ETHYL GLUCURONIDE UR: NOT DETECTED
FENTANYL URINE: NOT DETECTED
HYDROCODONE, URINE: NOT DETECTED
HYDROMORPHONE, URINE: NOT DETECTED
LORAZEPAM, URINE: PRESENT
MARIJUANA METAB, UR: NOT DETECTED
MDA, UR: NOT DETECTED
MDEA, EVE, UR: NOT DETECTED
MDMA URINE: NOT DETECTED
MEPERIDINE METAB, UR: NOT DETECTED
METHADONE, URINE: NOT DETECTED
METHAMPHETAMINE, URINE: NOT DETECTED
METHYLPHENIDATE: NOT DETECTED
MIDAZOLAM, URINE: NOT DETECTED
MORPHINE URINE: NOT DETECTED
NORBUPRENORPHINE, URINE: NOT DETECTED
NORDIAZEPAM, URINE: NOT DETECTED
NORFENTANYL, URINE: NOT DETECTED
NORHYDROCODONE, URINE: NOT DETECTED
NOROXYCODONE, URINE: NOT DETECTED
NOROXYMORPHONE, URINE: NOT DETECTED
OXAZEPAM, URINE: NOT DETECTED
OXYCODONE URINE: NOT DETECTED
OXYMORPHONE, URINE: NOT DETECTED
PAIN MANAGEMENT DRUG PANEL INTERP, URINE: NORMAL
PAIN MGT DRUG PANEL, HI RES, UR: NORMAL
PCP,URINE: NOT DETECTED
PHENTERMINE, UR: NOT DETECTED
PROPOXYPHENE, URINE: NOT DETECTED
TAPENTADOL, URINE: NOT DETECTED
TAPENTADOL-O-SULFATE, URINE: NOT DETECTED
TEMAZEPAM, URINE: NOT DETECTED
TRAMADOL, URINE: PRESENT
ZOLPIDEM, URINE: NOT DETECTED

## 2017-08-21 RX ORDER — FUROSEMIDE 40 MG/1
TABLET ORAL
Qty: 30 TABLET | Refills: 11 | Status: SHIPPED | OUTPATIENT
Start: 2017-08-21 | End: 2019-01-02

## 2017-08-23 DIAGNOSIS — F41.9 ANXIETY: ICD-10-CM

## 2017-08-23 RX ORDER — TRAMADOL HYDROCHLORIDE 50 MG/1
50 TABLET ORAL 2 TIMES DAILY PRN
Qty: 60 TABLET | Refills: 0 | Status: SHIPPED | OUTPATIENT
Start: 2017-08-23 | End: 2017-09-19 | Stop reason: SDUPTHER

## 2017-08-23 RX ORDER — LORAZEPAM 1 MG/1
1 TABLET ORAL EVERY 8 HOURS PRN
Qty: 60 TABLET | Refills: 0 | Status: SHIPPED | OUTPATIENT
Start: 2017-08-23 | End: 2017-09-19 | Stop reason: SDUPTHER

## 2017-09-06 ENCOUNTER — HOSPITAL ENCOUNTER (OUTPATIENT)
Age: 77
Setting detail: SPECIMEN
Discharge: HOME | End: 2017-09-06
Payer: MEDICARE

## 2017-09-08 LAB — DERMATOLOGY PATHOLOGY REPORT: NORMAL

## 2017-09-19 DIAGNOSIS — F41.9 ANXIETY: ICD-10-CM

## 2017-09-19 RX ORDER — LORAZEPAM 1 MG/1
1 TABLET ORAL EVERY 8 HOURS PRN
Qty: 60 TABLET | Refills: 0 | Status: SHIPPED | OUTPATIENT
Start: 2017-09-19 | End: 2017-10-16 | Stop reason: SDUPTHER

## 2017-09-19 RX ORDER — TRAMADOL HYDROCHLORIDE 50 MG/1
50 TABLET ORAL 2 TIMES DAILY PRN
Qty: 60 TABLET | Refills: 0 | Status: SHIPPED | OUTPATIENT
Start: 2017-09-19 | End: 2017-10-16 | Stop reason: SDUPTHER

## 2017-10-16 DIAGNOSIS — F41.9 ANXIETY: ICD-10-CM

## 2017-10-17 RX ORDER — TRAMADOL HYDROCHLORIDE 50 MG/1
50 TABLET ORAL 2 TIMES DAILY PRN
Qty: 60 TABLET | Refills: 0 | Status: SHIPPED | OUTPATIENT
Start: 2017-10-17 | End: 2017-11-15 | Stop reason: SDUPTHER

## 2017-10-17 RX ORDER — LORAZEPAM 1 MG/1
1 TABLET ORAL EVERY 8 HOURS PRN
Qty: 60 TABLET | Refills: 0 | Status: SHIPPED | OUTPATIENT
Start: 2017-10-17 | End: 2017-11-15 | Stop reason: SDUPTHER

## 2017-10-23 ENCOUNTER — HOSPITAL ENCOUNTER (OUTPATIENT)
Age: 77
Setting detail: SPECIMEN
Discharge: HOME | End: 2017-10-23
Payer: MEDICARE

## 2017-10-25 LAB — SURGICAL PATHOLOGY REPORT: NORMAL

## 2017-11-15 DIAGNOSIS — F41.9 ANXIETY: ICD-10-CM

## 2017-11-15 RX ORDER — LORAZEPAM 1 MG/1
1 TABLET ORAL EVERY 8 HOURS PRN
Qty: 60 TABLET | Refills: 0 | Status: SHIPPED | OUTPATIENT
Start: 2017-11-15 | End: 2017-12-13 | Stop reason: SDUPTHER

## 2017-11-15 RX ORDER — TRAMADOL HYDROCHLORIDE 50 MG/1
50 TABLET ORAL 2 TIMES DAILY PRN
Qty: 60 TABLET | Refills: 0 | Status: SHIPPED | OUTPATIENT
Start: 2017-11-15 | End: 2017-12-13 | Stop reason: SDUPTHER

## 2017-11-28 ENCOUNTER — OFFICE VISIT (OUTPATIENT)
Dept: INTERNAL MEDICINE CLINIC | Age: 77
End: 2017-11-28
Payer: MEDICARE

## 2017-11-28 VITALS
HEIGHT: 66 IN | SYSTOLIC BLOOD PRESSURE: 122 MMHG | DIASTOLIC BLOOD PRESSURE: 78 MMHG | BODY MASS INDEX: 26.2 KG/M2 | WEIGHT: 163 LBS

## 2017-11-28 DIAGNOSIS — K57.30 DIVERTICULOSIS OF LARGE INTESTINE WITHOUT HEMORRHAGE: ICD-10-CM

## 2017-11-28 DIAGNOSIS — I10 ESSENTIAL HYPERTENSION: ICD-10-CM

## 2017-11-28 DIAGNOSIS — K26.9 DUODENAL ULCER: Primary | ICD-10-CM

## 2017-11-28 PROCEDURE — 3017F COLORECTAL CA SCREEN DOC REV: CPT | Performed by: INTERNAL MEDICINE

## 2017-11-28 PROCEDURE — G8419 CALC BMI OUT NRM PARAM NOF/U: HCPCS | Performed by: INTERNAL MEDICINE

## 2017-11-28 PROCEDURE — 1036F TOBACCO NON-USER: CPT | Performed by: INTERNAL MEDICINE

## 2017-11-28 PROCEDURE — G8484 FLU IMMUNIZE NO ADMIN: HCPCS | Performed by: INTERNAL MEDICINE

## 2017-11-28 PROCEDURE — G8427 DOCREV CUR MEDS BY ELIG CLIN: HCPCS | Performed by: INTERNAL MEDICINE

## 2017-11-28 PROCEDURE — 1123F ACP DISCUSS/DSCN MKR DOCD: CPT | Performed by: INTERNAL MEDICINE

## 2017-11-28 PROCEDURE — 1090F PRES/ABSN URINE INCON ASSESS: CPT | Performed by: INTERNAL MEDICINE

## 2017-11-28 PROCEDURE — 4040F PNEUMOC VAC/ADMIN/RCVD: CPT | Performed by: INTERNAL MEDICINE

## 2017-11-28 PROCEDURE — 99214 OFFICE O/P EST MOD 30 MIN: CPT | Performed by: INTERNAL MEDICINE

## 2017-11-28 PROCEDURE — G8400 PT W/DXA NO RESULTS DOC: HCPCS | Performed by: INTERNAL MEDICINE

## 2017-11-28 RX ORDER — OXYCODONE HYDROCHLORIDE AND ACETAMINOPHEN 5; 325 MG/1; MG/1
TABLET ORAL
COMMUNITY
Start: 2017-10-23 | End: 2017-11-28 | Stop reason: ALTCHOICE

## 2017-11-28 ASSESSMENT — PATIENT HEALTH QUESTIONNAIRE - PHQ9
1. LITTLE INTEREST OR PLEASURE IN DOING THINGS: 0
2. FEELING DOWN, DEPRESSED OR HOPELESS: 0
SUM OF ALL RESPONSES TO PHQ QUESTIONS 1-9: 0
SUM OF ALL RESPONSES TO PHQ9 QUESTIONS 1 & 2: 0

## 2017-11-28 ASSESSMENT — ENCOUNTER SYMPTOMS
ABDOMINAL PAIN: 1
EYE PAIN: 0
BLOOD IN STOOL: 0
BLURRED VISION: 0
SINUS PRESSURE: 0
RHINORRHEA: 0
VOMITING: 1
EYE DISCHARGE: 0
SHORTNESS OF BREATH: 0
CONSTIPATION: 0
WHEEZING: 0
DIARRHEA: 1
SORE THROAT: 0
TROUBLE SWALLOWING: 0
BACK PAIN: 0
ANAL BLEEDING: 0
ORTHOPNEA: 0
COUGH: 0
CHEST TIGHTNESS: 0

## 2017-11-28 NOTE — PROGRESS NOTES
Subjective:      Patient ID: Errol Gitelman is a 78 y.o. female. Visit Information    Have you changed or started any medications since your last visit including any over-the-counter medicines, vitamins, or herbal medicines? no   Are you having any side effects from any of your medications? -  no  Have you stopped taking any of your medications? Is so, why? -  no    Have you seen any other physician or provider since your last visit? No  Have you had any other diagnostic tests since your last visit? No  Have you been seen in the emergency room and/or had an admission to a hospital since we last saw you? No  Have you had your routine dental cleaning in the past 6 months? no    Have you activated your [employer] account? If not, what are your barriers? Yes     Patient Care Team:  Jhony Weems MD as PCP - [address] [place] [address], NP as PCP - [facility] Attributed Provider  Ruslan Garcia MD as Consulting Physician (Gastroenterology)    Medical History Review  Past Medical, Family, and Social History reviewed and does contribute to the patient presenting condition    Health Maintenance   Topic Date Due    DTaP/Tdap/Td vaccine (1 - Tdap) 07/16/1961    Zostavax vaccine  07/16/2002    DEXA (modify frequency per FRAX score)  07/16/2007    Pneumococcal low/med risk (2 of 2 - PPSV23) 10/20/2016    Flu vaccine (1) 09/01/2017    Colon Cancer Screen FIT/FOBT  02/06/2018    Lipid screen  08/15/2021     Chief Complaint   Patient presents with    Hypertension     managment     Cancer     skin cancer has been removed        Hypertension   This is a chronic problem. The current episode started more than 1 year ago. The problem has been resolved since onset. Pertinent negatives include no anxiety, blurred vision, chest pain, headaches, malaise/fatigue, neck pain, orthopnea, palpitations, peripheral edema, PND, shortness of breath or sweats. There are no associated agents to hypertension.  There are no known risk factors for coronary artery disease. Past treatments include ACE inhibitors. There are no compliance problems. There is no history of angina, kidney disease, CAD/MI, CVA, heart failure, left ventricular hypertrophy, PVD or retinopathy. Review of Systems   Constitutional: Positive for chills and fever. Negative for diaphoresis, fatigue and malaise/fatigue. HENT: Negative for ear discharge, rhinorrhea, sinus pressure, sore throat and trouble swallowing. Eyes: Negative for blurred vision, pain, discharge and visual disturbance. Respiratory: Negative for cough, chest tightness, shortness of breath and wheezing. Cardiovascular: Negative for chest pain, palpitations, orthopnea, leg swelling and PND. Gastrointestinal: Positive for abdominal pain, diarrhea and vomiting. Negative for anal bleeding, blood in stool and constipation. Endocrine: Negative for polydipsia, polyphagia and polyuria. Genitourinary: Negative for dysuria, flank pain, frequency and hematuria. Musculoskeletal: Positive for myalgias. Negative for back pain, gait problem, joint swelling, neck pain and neck stiffness. Skin: Negative for rash and wound. Allergic/Immunologic: Negative for environmental allergies, food allergies and immunocompromised state. Neurological: Negative for dizziness, tremors, seizures, syncope, speech difficulty, weakness, numbness and headaches. Psychiatric/Behavioral: Negative for behavioral problems, confusion, hallucinations and suicidal ideas. The patient is not nervous/anxious. Objective:   Physical Exam   Constitutional: She is oriented to person, place, and time. She appears well-developed and well-nourished. She is active and cooperative. She does not have a sickly appearance. No distress. HENT:   Head: Normocephalic and atraumatic. Head is without abrasion, without contusion and without laceration.    Right Ear: External ear normal.   Left Ear: External ear normal.   Nose: No mucosal edema, nose lacerations or septal deviation. Mouth/Throat: Oropharynx is clear and moist. Mucous membranes are not pale. No oropharyngeal exudate or posterior oropharyngeal edema. Eyes: EOM are normal. Pupils are equal, round, and reactive to light. Right eye exhibits no discharge. Left eye exhibits no discharge. No scleral icterus. Neck: Neck supple. No hepatojugular reflux present. Carotid bruit is not present. No thyroid mass and no thyromegaly present. Cardiovascular: Normal rate, regular rhythm and normal heart sounds. No murmur heard. Pulmonary/Chest: Effort normal and breath sounds normal. She has no wheezes. She has no rales. Abdominal: Soft. She exhibits no distension, no ascites and no mass. There is no hepatosplenomegaly. There is no tenderness. There is no rigidity, no guarding and no CVA tenderness. No hernia. Hernia confirmed negative in the ventral area. Musculoskeletal: Normal range of motion. She exhibits no edema or tenderness. Lymphadenopathy:     She has no cervical adenopathy. She has no axillary adenopathy. Right: No supraclavicular and no epitrochlear adenopathy present. Left: No supraclavicular and no epitrochlear adenopathy present. Neurological: She is alert and oriented to person, place, and time. She displays no atrophy and no tremor. No cranial nerve deficit. She exhibits normal muscle tone. She displays no seizure activity. Coordination and gait normal.   Skin: Skin is warm and dry. No bruising, no laceration, no petechiae and no rash noted. She is not diaphoretic. No cyanosis or erythema. No pallor. Nails show no clubbing. Psychiatric: She has a normal mood and affect. Her speech is normal and behavior is normal. Judgment and thought content normal. Cognition and memory are normal.   Vitals reviewed. Assessment / Plan:      1. Duodenal ulcer     2. Diverticulosis of large intestine without hemorrhage     3.  Essential hypertension       Return in about 4 months (around 3/28/2018) for Follow Up. No orders of the defined types were placed in this encounter. No orders of the defined types were placed in this encounter.

## 2017-12-13 DIAGNOSIS — F41.9 ANXIETY: ICD-10-CM

## 2017-12-13 RX ORDER — LORAZEPAM 1 MG/1
1 TABLET ORAL EVERY 8 HOURS PRN
Qty: 60 TABLET | Refills: 0 | Status: SHIPPED | OUTPATIENT
Start: 2017-12-13 | End: 2018-01-10 | Stop reason: SDUPTHER

## 2017-12-13 RX ORDER — TRAMADOL HYDROCHLORIDE 50 MG/1
50 TABLET ORAL 2 TIMES DAILY PRN
Qty: 60 TABLET | Refills: 0 | Status: SHIPPED | OUTPATIENT
Start: 2017-12-13 | End: 2018-01-10 | Stop reason: SDUPTHER

## 2017-12-15 ENCOUNTER — TELEPHONE (OUTPATIENT)
Dept: INTERNAL MEDICINE CLINIC | Age: 77
End: 2017-12-15

## 2017-12-15 ENCOUNTER — HOSPITAL ENCOUNTER (OUTPATIENT)
Age: 77
Setting detail: SPECIMEN
Discharge: HOME | End: 2017-12-15
Payer: MEDICARE

## 2017-12-15 DIAGNOSIS — N39.0 URINARY TRACT INFECTION WITHOUT HEMATURIA, SITE UNSPECIFIED: Primary | ICD-10-CM

## 2017-12-15 LAB
-: ABNORMAL
AMORPHOUS: ABNORMAL
BACTERIA: ABNORMAL
BILIRUBIN URINE: NEGATIVE
CASTS UA: ABNORMAL /LPF (ref 0–8)
COLOR: YELLOW
COMMENT UA: ABNORMAL
CRYSTALS, UA: ABNORMAL /HPF
EPITHELIAL CELLS UA: ABNORMAL /HPF (ref 0–5)
GLUCOSE URINE: NEGATIVE
KETONES, URINE: NEGATIVE
LEUKOCYTE ESTERASE, URINE: ABNORMAL
MUCUS: ABNORMAL
NITRITE, URINE: NEGATIVE
OTHER OBSERVATIONS UA: ABNORMAL
PH UA: 6 (ref 5–8)
PROTEIN UA: ABNORMAL
RBC UA: ABNORMAL /HPF (ref 0–4)
RENAL EPITHELIAL, UA: ABNORMAL /HPF
SPECIFIC GRAVITY UA: 1.01 (ref 1–1.03)
TRICHOMONAS: ABNORMAL
TURBIDITY: ABNORMAL
URINE HGB: ABNORMAL
UROBILINOGEN, URINE: NORMAL
WBC UA: ABNORMAL /HPF (ref 0–5)
YEAST: ABNORMAL

## 2017-12-15 RX ORDER — CIPROFLOXACIN 500 MG/1
500 TABLET, FILM COATED ORAL 2 TIMES DAILY
Qty: 10 TABLET | Refills: 0 | Status: SHIPPED | OUTPATIENT
Start: 2017-12-15 | End: 2017-12-20

## 2017-12-15 NOTE — TELEPHONE ENCOUNTER
Sick Call    Daljit Watts   1940   [id number]   Magnus Alston MD   Allergies   Allergen Reactions    Bee Pollen Anaphylaxis    Iron Rash        Last Visit: 11/28/2017  Reason for No Same Day Appt:     Complaint:UIT? Frequency and burning while urinating.  Will be in later today to do a UA   requesting RX    [facility]

## 2018-01-10 DIAGNOSIS — F41.9 ANXIETY: ICD-10-CM

## 2018-01-10 DIAGNOSIS — G89.4 CHRONIC PAIN SYNDROME: ICD-10-CM

## 2018-01-10 RX ORDER — TRAMADOL HYDROCHLORIDE 50 MG/1
50 TABLET ORAL 2 TIMES DAILY PRN
Qty: 120 TABLET | Refills: 0 | Status: SHIPPED | OUTPATIENT
Start: 2018-01-10 | End: 2018-03-12 | Stop reason: SDUPTHER

## 2018-01-10 RX ORDER — LORAZEPAM 1 MG/1
1 TABLET ORAL EVERY 8 HOURS PRN
Qty: 120 TABLET | Refills: 0 | Status: SHIPPED | OUTPATIENT
Start: 2018-01-10 | End: 2018-03-12 | Stop reason: SDUPTHER

## 2018-01-10 NOTE — TELEPHONE ENCOUNTER
Please approve or refuse    Health Maintenance   Topic Date Due    DTaP/Tdap/Td vaccine (1 - Tdap) 07/16/1961    Zostavax vaccine  07/16/2002    DEXA (modify frequency per FRAX score)  07/16/2007    Pneumococcal low/med risk (2 of 2 - PPSV23) 10/20/2016    Flu vaccine (1) 09/01/2017    Colon Cancer Screen FIT/FOBT  02/06/2018    Potassium monitoring  02/06/2018    Creatinine monitoring  02/06/2018    Lipid screen  08/15/2021             (applicable per patient's age: Cancer Screenings, Depression Screening, Fall Risk Screening, Immunizations)    LDL Cholesterol (mg/dL)   Date Value   08/15/2016 111     AST (U/L)   Date Value   02/05/2017 14     ALT (U/L)   Date Value   02/05/2017 11     BUN (mg/dL)   Date Value   02/06/2017 8      (goal A1C is < 7)   (goal LDL is <100) need 30-50% reduction from baseline     BP Readings from Last 3 Encounters:   11/28/17 122/78   05/24/17 122/66   02/22/17 120/60    (goal /80)      All Future Testing planned in [facility]:  Lab Frequency Next Occurrence       Next Visit Date:  Future Appointments  Date Time Provider João Mullins   4/10/2018 1:30 PM Ethel Estrada MD [facility]            Patient Active Problem List:     Hypertension     Anemia     Cataracts, bilateral     Hepatitis     Diverticulosis     Lower urinary tract infectious disease     Bell's palsy     Hepatomegaly     Duodenal ulcer     Gastric ulcer     Osteoarthritis     Hyperlipidemia     GI bleed     Lower GI bleed     Ileus ([address]     Anxiety     Colitis     Food poisoning     Diarrhea of presumed infectious origin     Iron deficiency anemia due to chronic blood loss     Rectal bleeding

## 2018-01-26 RX ORDER — OMEPRAZOLE 20 MG/1
CAPSULE, DELAYED RELEASE ORAL
Qty: 90 CAPSULE | Refills: 3 | Status: SHIPPED | OUTPATIENT
Start: 2018-01-26 | End: 2019-02-27 | Stop reason: SDUPTHER

## 2018-03-12 DIAGNOSIS — G89.4 CHRONIC PAIN SYNDROME: ICD-10-CM

## 2018-03-12 DIAGNOSIS — F41.9 ANXIETY: ICD-10-CM

## 2018-03-12 DIAGNOSIS — I10 ESSENTIAL HYPERTENSION: ICD-10-CM

## 2018-03-13 RX ORDER — LORAZEPAM 1 MG/1
1 TABLET ORAL EVERY 8 HOURS PRN
Qty: 120 TABLET | Refills: 0 | Status: SHIPPED | OUTPATIENT
Start: 2018-03-13 | End: 2018-05-08 | Stop reason: SDUPTHER

## 2018-03-13 RX ORDER — LISINOPRIL 10 MG/1
10 TABLET ORAL DAILY
Qty: 30 TABLET | Refills: 11 | Status: SHIPPED | OUTPATIENT
Start: 2018-03-13 | End: 2018-07-06 | Stop reason: SDUPTHER

## 2018-03-13 RX ORDER — TRAMADOL HYDROCHLORIDE 50 MG/1
50 TABLET ORAL 2 TIMES DAILY PRN
Qty: 120 TABLET | Refills: 0 | Status: SHIPPED | OUTPATIENT
Start: 2018-03-13 | End: 2018-05-08 | Stop reason: SDUPTHER

## 2018-05-08 DIAGNOSIS — G89.4 CHRONIC PAIN SYNDROME: ICD-10-CM

## 2018-05-08 DIAGNOSIS — F41.9 ANXIETY: ICD-10-CM

## 2018-05-08 RX ORDER — TRAMADOL HYDROCHLORIDE 50 MG/1
50 TABLET ORAL 2 TIMES DAILY PRN
Qty: 120 TABLET | Refills: 0 | Status: SHIPPED | OUTPATIENT
Start: 2018-05-08 | End: 2018-07-02 | Stop reason: SDUPTHER

## 2018-05-08 RX ORDER — LORAZEPAM 1 MG/1
1 TABLET ORAL EVERY 8 HOURS PRN
Qty: 120 TABLET | Refills: 0 | Status: SHIPPED | OUTPATIENT
Start: 2018-05-08 | End: 2018-07-02 | Stop reason: SDUPTHER

## 2018-07-02 DIAGNOSIS — F41.9 ANXIETY: ICD-10-CM

## 2018-07-02 DIAGNOSIS — G89.4 CHRONIC PAIN SYNDROME: ICD-10-CM

## 2018-07-02 RX ORDER — LORAZEPAM 1 MG/1
1 TABLET ORAL EVERY 8 HOURS PRN
Qty: 120 TABLET | Refills: 0 | Status: SHIPPED | OUTPATIENT
Start: 2018-07-02 | End: 2018-09-04 | Stop reason: SDUPTHER

## 2018-07-02 RX ORDER — TRAMADOL HYDROCHLORIDE 50 MG/1
50 TABLET ORAL 2 TIMES DAILY PRN
Qty: 120 TABLET | Refills: 0 | Status: SHIPPED | OUTPATIENT
Start: 2018-07-02 | End: 2018-09-04 | Stop reason: SDUPTHER

## 2018-07-06 DIAGNOSIS — I10 ESSENTIAL HYPERTENSION: ICD-10-CM

## 2018-07-09 RX ORDER — LISINOPRIL 10 MG/1
10 TABLET ORAL DAILY
Qty: 90 TABLET | Refills: 1 | Status: SHIPPED | OUTPATIENT
Start: 2018-07-09 | End: 2019-02-27 | Stop reason: SDUPTHER

## 2018-09-04 DIAGNOSIS — G89.4 CHRONIC PAIN SYNDROME: ICD-10-CM

## 2018-09-04 DIAGNOSIS — F41.9 ANXIETY: ICD-10-CM

## 2018-09-04 RX ORDER — LORAZEPAM 1 MG/1
1 TABLET ORAL EVERY 8 HOURS PRN
Qty: 120 TABLET | Refills: 0 | Status: SHIPPED | OUTPATIENT
Start: 2018-09-04 | End: 2018-11-05 | Stop reason: SDUPTHER

## 2018-09-04 RX ORDER — TRAMADOL HYDROCHLORIDE 50 MG/1
50 TABLET ORAL 2 TIMES DAILY PRN
Qty: 120 TABLET | Refills: 0 | Status: SHIPPED | OUTPATIENT
Start: 2018-09-04 | End: 2018-11-05 | Stop reason: SDUPTHER

## 2018-09-04 NOTE — TELEPHONE ENCOUNTER
Medication Requested: Lorazepam and Tramadol  Last visit:  2017  Next visit: Visit date not found  Last refill: 18 for both meds    Med contract on file:  [x] yes   [] no    Last urine drug screen:  2017  Consistent with medication(s):    [x] yes   [] no    Last OARRS ran: 11/15/17    Quantity of medication remainin ativan and 2 tramadol    Who will be picking rx up: Teofilo if escribed: Riya Madison / [facility]

## 2018-11-05 DIAGNOSIS — F41.9 ANXIETY: ICD-10-CM

## 2018-11-05 DIAGNOSIS — Z02.83 ENCOUNTER FOR DRUG SCREENING: Primary | ICD-10-CM

## 2018-11-05 DIAGNOSIS — Z51.81 ENCOUNTER FOR THERAPEUTIC DRUG LEVEL MONITORING: ICD-10-CM

## 2018-11-05 DIAGNOSIS — G89.4 CHRONIC PAIN SYNDROME: ICD-10-CM

## 2018-11-05 RX ORDER — TRAMADOL HYDROCHLORIDE 50 MG/1
50 TABLET ORAL 2 TIMES DAILY PRN
Qty: 120 TABLET | Refills: 0 | Status: SHIPPED | OUTPATIENT
Start: 2018-11-05 | End: 2018-12-26 | Stop reason: SDUPTHER

## 2018-11-05 RX ORDER — LORAZEPAM 1 MG/1
1 TABLET ORAL EVERY 8 HOURS PRN
Qty: 120 TABLET | Refills: 0 | Status: SHIPPED | OUTPATIENT
Start: 2018-11-05 | End: 2018-12-26 | Stop reason: SDUPTHER

## 2018-12-26 DIAGNOSIS — F41.9 ANXIETY: ICD-10-CM

## 2018-12-26 DIAGNOSIS — G89.4 CHRONIC PAIN SYNDROME: ICD-10-CM

## 2018-12-27 RX ORDER — TRAMADOL HYDROCHLORIDE 50 MG/1
50 TABLET ORAL 2 TIMES DAILY PRN
Qty: 120 TABLET | Refills: 0 | Status: SHIPPED | OUTPATIENT
Start: 2018-12-27 | End: 2018-12-27 | Stop reason: CLARIF

## 2018-12-27 RX ORDER — LORAZEPAM 1 MG/1
1 TABLET ORAL EVERY 8 HOURS PRN
Qty: 120 TABLET | Refills: 0 | Status: SHIPPED | OUTPATIENT
Start: 2018-12-27 | End: 2018-12-27 | Stop reason: CLARIF

## 2019-01-02 ENCOUNTER — HOSPITAL ENCOUNTER (OUTPATIENT)
Age: 79
Setting detail: SPECIMEN
Discharge: HOME | End: 2019-01-02
Payer: MEDICARE

## 2019-01-02 ENCOUNTER — OFFICE VISIT (OUTPATIENT)
Dept: INTERNAL MEDICINE CLINIC | Age: 79
End: 2019-01-02
Payer: MEDICARE

## 2019-01-02 VITALS
SYSTOLIC BLOOD PRESSURE: 126 MMHG | HEART RATE: 86 BPM | WEIGHT: 162 LBS | HEIGHT: 66 IN | DIASTOLIC BLOOD PRESSURE: 67 MMHG | BODY MASS INDEX: 26.03 KG/M2

## 2019-01-02 DIAGNOSIS — Z00.00 ANNUAL PHYSICAL EXAM: ICD-10-CM

## 2019-01-02 DIAGNOSIS — R52 CHRONIC GENERALIZED PAIN: ICD-10-CM

## 2019-01-02 DIAGNOSIS — G89.29 CHRONIC GENERALIZED PAIN: ICD-10-CM

## 2019-01-02 DIAGNOSIS — F41.9 ANXIETY: ICD-10-CM

## 2019-01-02 DIAGNOSIS — I10 ESSENTIAL HYPERTENSION: ICD-10-CM

## 2019-01-02 DIAGNOSIS — Z00.00 ANNUAL PHYSICAL EXAM: Primary | ICD-10-CM

## 2019-01-02 DIAGNOSIS — L85.3 DRY SKIN: ICD-10-CM

## 2019-01-02 DIAGNOSIS — H04.123 DRY EYES, BILATERAL: ICD-10-CM

## 2019-01-02 LAB
ALBUMIN SERPL-MCNC: 3.9 G/DL (ref 3.5–5.2)
ALBUMIN/GLOBULIN RATIO: 1 (ref 1–2.5)
ALP BLD-CCNC: 61 U/L (ref 35–104)
ALT SERPL-CCNC: 10 U/L (ref 5–33)
AMPHETAMINE SCREEN URINE: NEGATIVE
ANION GAP SERPL CALCULATED.3IONS-SCNC: 17 MMOL/L (ref 9–17)
AST SERPL-CCNC: 11 U/L
BARBITURATE SCREEN URINE: NEGATIVE
BENZODIAZEPINE SCREEN, URINE: NEGATIVE
BILIRUB SERPL-MCNC: 0.23 MG/DL (ref 0.3–1.2)
BILIRUBIN URINE: NEGATIVE
BUN BLDV-MCNC: 18 MG/DL (ref 8–23)
BUN/CREAT BLD: ABNORMAL (ref 9–20)
BUPRENORPHINE URINE: NORMAL
CALCIUM SERPL-MCNC: 9.6 MG/DL (ref 8.6–10.4)
CANNABINOID SCREEN URINE: NEGATIVE
CHLORIDE BLD-SCNC: 103 MMOL/L (ref 98–107)
CHOLESTEROL/HDL RATIO: 3.3
CHOLESTEROL: 203 MG/DL
CO2: 24 MMOL/L (ref 20–31)
COCAINE METABOLITE, URINE: NEGATIVE
COLOR: YELLOW
COMMENT UA: NORMAL
CREAT SERPL-MCNC: 0.65 MG/DL (ref 0.5–0.9)
GFR AFRICAN AMERICAN: >60 ML/MIN
GFR NON-AFRICAN AMERICAN: >60 ML/MIN
GFR SERPL CREATININE-BSD FRML MDRD: ABNORMAL ML/MIN/{1.73_M2}
GFR SERPL CREATININE-BSD FRML MDRD: ABNORMAL ML/MIN/{1.73_M2}
GLUCOSE BLD-MCNC: 89 MG/DL (ref 70–99)
GLUCOSE URINE: NEGATIVE
HCT VFR BLD CALC: 31.8 % (ref 36.3–47.1)
HDLC SERPL-MCNC: 61 MG/DL
HEMOGLOBIN: 9 G/DL (ref 11.9–15.1)
KETONES, URINE: NEGATIVE
LDL CHOLESTEROL: 126 MG/DL (ref 0–130)
LEUKOCYTE ESTERASE, URINE: NEGATIVE
MCH RBC QN AUTO: 20.3 PG (ref 25.2–33.5)
MCHC RBC AUTO-ENTMCNC: 28.3 G/DL (ref 28.4–34.8)
MCV RBC AUTO: 71.6 FL (ref 82.6–102.9)
MDMA URINE: NORMAL
METHADONE SCREEN, URINE: NEGATIVE
METHAMPHETAMINE, URINE: NORMAL
NITRITE, URINE: NEGATIVE
NRBC AUTOMATED: 0 PER 100 WBC
OPIATES, URINE: NEGATIVE
OXYCODONE SCREEN URINE: NEGATIVE
PDW BLD-RTO: 19.1 % (ref 11.8–14.4)
PH UA: 5.5 (ref 5–8)
PHENCYCLIDINE, URINE: NEGATIVE
PLATELET # BLD: 403 K/UL (ref 138–453)
PMV BLD AUTO: 10.1 FL (ref 8.1–13.5)
POTASSIUM SERPL-SCNC: 4.3 MMOL/L (ref 3.7–5.3)
PROPOXYPHENE, URINE: NORMAL
PROTEIN UA: NEGATIVE
RBC # BLD: 4.44 M/UL (ref 3.95–5.11)
SODIUM BLD-SCNC: 144 MMOL/L (ref 135–144)
SPECIFIC GRAVITY UA: 1.01 (ref 1–1.03)
TEST INFORMATION: NORMAL
TOTAL PROTEIN: 7.7 G/DL (ref 6.4–8.3)
TRICYCLIC ANTIDEPRESSANTS, UR: NORMAL
TRIGL SERPL-MCNC: 82 MG/DL
TSH SERPL DL<=0.05 MIU/L-ACNC: 2.02 MIU/L (ref 0.3–5)
TURBIDITY: CLEAR
URINE HGB: NEGATIVE
UROBILINOGEN, URINE: NORMAL
VLDLC SERPL CALC-MCNC: ABNORMAL MG/DL (ref 1–30)
WBC # BLD: 7.2 K/UL (ref 3.5–11.3)

## 2019-01-02 PROCEDURE — G8419 CALC BMI OUT NRM PARAM NOF/U: HCPCS | Performed by: NURSE PRACTITIONER

## 2019-01-02 PROCEDURE — 1090F PRES/ABSN URINE INCON ASSESS: CPT | Performed by: NURSE PRACTITIONER

## 2019-01-02 PROCEDURE — G8484 FLU IMMUNIZE NO ADMIN: HCPCS | Performed by: NURSE PRACTITIONER

## 2019-01-02 PROCEDURE — 1036F TOBACCO NON-USER: CPT | Performed by: NURSE PRACTITIONER

## 2019-01-02 PROCEDURE — G8400 PT W/DXA NO RESULTS DOC: HCPCS | Performed by: NURSE PRACTITIONER

## 2019-01-02 PROCEDURE — 1123F ACP DISCUSS/DSCN MKR DOCD: CPT | Performed by: NURSE PRACTITIONER

## 2019-01-02 PROCEDURE — 4040F PNEUMOC VAC/ADMIN/RCVD: CPT | Performed by: NURSE PRACTITIONER

## 2019-01-02 PROCEDURE — G8427 DOCREV CUR MEDS BY ELIG CLIN: HCPCS | Performed by: NURSE PRACTITIONER

## 2019-01-02 PROCEDURE — 1101F PT FALLS ASSESS-DOCD LE1/YR: CPT | Performed by: NURSE PRACTITIONER

## 2019-01-02 PROCEDURE — 99214 OFFICE O/P EST MOD 30 MIN: CPT | Performed by: NURSE PRACTITIONER

## 2019-01-02 RX ORDER — FUROSEMIDE 40 MG/1
40 TABLET ORAL DAILY
COMMUNITY
End: 2019-06-18 | Stop reason: SDUPTHER

## 2019-01-02 RX ORDER — POLYVINYL ALCOHOL 14 MG/ML
2 SOLUTION/ DROPS OPHTHALMIC PRN
COMMUNITY
End: 2021-06-15 | Stop reason: SDUPTHER

## 2019-01-02 RX ORDER — SERTRALINE HYDROCHLORIDE 25 MG/1
25 TABLET, FILM COATED ORAL DAILY
Qty: 30 TABLET | Refills: 3 | Status: SHIPPED | OUTPATIENT
Start: 2019-01-02 | End: 2019-06-06

## 2019-01-02 RX ORDER — TRAMADOL HYDROCHLORIDE 50 MG/1
50 TABLET ORAL EVERY 6 HOURS PRN
Qty: 120 TABLET | Refills: 0 | Status: SHIPPED | OUTPATIENT
Start: 2019-01-02 | End: 2019-03-03

## 2019-01-02 RX ORDER — ASPIRIN 81 MG/1
81 TABLET ORAL DAILY
Status: ON HOLD | COMMUNITY
End: 2019-06-12 | Stop reason: HOSPADM

## 2019-01-02 RX ORDER — TRAMADOL HYDROCHLORIDE 50 MG/1
50 TABLET ORAL EVERY 6 HOURS PRN
COMMUNITY
End: 2019-01-02 | Stop reason: SDUPTHER

## 2019-01-02 RX ORDER — LORAZEPAM 1 MG/1
1 TABLET ORAL EVERY 8 HOURS PRN
COMMUNITY
End: 2019-01-02 | Stop reason: SDUPTHER

## 2019-01-02 RX ORDER — LORAZEPAM 1 MG/1
1 TABLET ORAL EVERY 8 HOURS PRN
Qty: 120 TABLET | Refills: 0 | Status: SHIPPED | OUTPATIENT
Start: 2019-01-02 | End: 2019-03-03

## 2019-01-02 ASSESSMENT — PATIENT HEALTH QUESTIONNAIRE - PHQ9
SUM OF ALL RESPONSES TO PHQ QUESTIONS 1-9: 0
1. LITTLE INTEREST OR PLEASURE IN DOING THINGS: 0
2. FEELING DOWN, DEPRESSED OR HOPELESS: 0
SUM OF ALL RESPONSES TO PHQ9 QUESTIONS 1 & 2: 0
SUM OF ALL RESPONSES TO PHQ QUESTIONS 1-9: 0

## 2019-01-02 ASSESSMENT — ENCOUNTER SYMPTOMS
ABDOMINAL PAIN: 0
WHEEZING: 0
EYE REDNESS: 0
TROUBLE SWALLOWING: 0
DIARRHEA: 0
CONSTIPATION: 0
COLOR CHANGE: 0
SHORTNESS OF BREATH: 0
EYE DISCHARGE: 0
COUGH: 0

## 2019-02-27 DIAGNOSIS — R52 CHRONIC GENERALIZED PAIN: ICD-10-CM

## 2019-02-27 DIAGNOSIS — I10 ESSENTIAL HYPERTENSION: ICD-10-CM

## 2019-02-27 DIAGNOSIS — F41.9 ANXIETY: ICD-10-CM

## 2019-02-27 DIAGNOSIS — G89.29 CHRONIC GENERALIZED PAIN: ICD-10-CM

## 2019-02-27 DIAGNOSIS — G89.4 CHRONIC PAIN SYNDROME: ICD-10-CM

## 2019-02-27 RX ORDER — LISINOPRIL 10 MG/1
10 TABLET ORAL DAILY
Qty: 90 TABLET | Refills: 3 | Status: SHIPPED | OUTPATIENT
Start: 2019-02-27 | End: 2019-12-27 | Stop reason: SDUPTHER

## 2019-02-28 RX ORDER — LORAZEPAM 1 MG/1
TABLET ORAL
Qty: 120 TABLET | Refills: 0 | Status: SHIPPED | OUTPATIENT
Start: 2019-02-28 | End: 2019-05-01 | Stop reason: SDUPTHER

## 2019-02-28 RX ORDER — LORAZEPAM 1 MG/1
TABLET ORAL
Qty: 120 TABLET | Refills: 0 | OUTPATIENT
Start: 2019-02-28

## 2019-02-28 RX ORDER — OMEPRAZOLE 20 MG/1
CAPSULE, DELAYED RELEASE ORAL
Qty: 90 CAPSULE | Refills: 0 | Status: SHIPPED | OUTPATIENT
Start: 2019-02-28 | End: 2019-05-25 | Stop reason: SDUPTHER

## 2019-02-28 RX ORDER — TRAMADOL HYDROCHLORIDE 50 MG/1
TABLET ORAL
Qty: 120 TABLET | Refills: 0 | OUTPATIENT
Start: 2019-02-28

## 2019-02-28 RX ORDER — TRAMADOL HYDROCHLORIDE 50 MG/1
TABLET ORAL
Qty: 120 TABLET | Refills: 0 | Status: SHIPPED | OUTPATIENT
Start: 2019-02-28 | End: 2019-05-01 | Stop reason: SDUPTHER

## 2019-05-01 DIAGNOSIS — G89.4 CHRONIC PAIN SYNDROME: ICD-10-CM

## 2019-05-01 DIAGNOSIS — F41.9 ANXIETY: ICD-10-CM

## 2019-05-01 NOTE — TELEPHONE ENCOUNTER
Medication Requested: tramadol  Lorazepam    Last visit: 01/02/19  Next visit: Visit date not found  Last refill: 02/08/19    Med contract on file:  [] yes   [x] no    Last urine drug screen: 01/02/19  Consistent with medication(s):    [] yes   [x] no    Last OARRS ran:     Quantity of medication remaining: unsure    Who will be picking rx up: self    Pharmacy if escribed: [facility]

## 2019-05-02 RX ORDER — LORAZEPAM 1 MG/1
1 TABLET ORAL EVERY 8 HOURS PRN
Qty: 120 TABLET | Refills: 0 | Status: SHIPPED | OUTPATIENT
Start: 2019-05-02 | End: 2019-06-26 | Stop reason: SDUPTHER

## 2019-05-02 RX ORDER — TRAMADOL HYDROCHLORIDE 50 MG/1
50 TABLET ORAL 2 TIMES DAILY PRN
Qty: 120 TABLET | Refills: 0 | Status: SHIPPED | OUTPATIENT
Start: 2019-05-02 | End: 2019-06-26 | Stop reason: SDUPTHER

## 2019-05-28 RX ORDER — OMEPRAZOLE 20 MG/1
CAPSULE, DELAYED RELEASE ORAL
Qty: 90 CAPSULE | Refills: 2 | Status: SHIPPED | OUTPATIENT
Start: 2019-05-28 | End: 2020-03-27

## 2019-06-06 ENCOUNTER — HOSPITAL ENCOUNTER (INPATIENT)
Age: 79
LOS: 6 days | Discharge: HOME | DRG: 194 | End: 2019-06-12
Attending: EMERGENCY MEDICINE | Admitting: INTERNAL MEDICINE
Payer: MEDICARE

## 2019-06-06 ENCOUNTER — APPOINTMENT (OUTPATIENT)
Dept: GENERAL RADIOLOGY | Age: 79
DRG: 194 | End: 2019-06-06
Payer: MEDICARE

## 2019-06-06 DIAGNOSIS — J18.9 PNEUMONIA DUE TO ORGANISM: Primary | ICD-10-CM

## 2019-06-06 DIAGNOSIS — I48.91 ATRIAL FIBRILLATION, UNSPECIFIED TYPE (HCC): ICD-10-CM

## 2019-06-06 LAB
-: ABNORMAL
ABSOLUTE EOS #: 0 K/UL (ref 0–0.4)
ABSOLUTE IMMATURE GRANULOCYTE: ABNORMAL K/UL (ref 0–0.3)
ABSOLUTE LYMPH #: 0.95 K/UL (ref 1–4.8)
ABSOLUTE MONO #: 0.79 K/UL (ref 0.1–1.3)
ALBUMIN SERPL-MCNC: 3.6 G/DL (ref 3.5–5.2)
ALBUMIN/GLOBULIN RATIO: ABNORMAL (ref 1–2.5)
ALP BLD-CCNC: 63 U/L (ref 35–104)
ALT SERPL-CCNC: 11 U/L (ref 5–33)
AMORPHOUS: ABNORMAL
ANION GAP SERPL CALCULATED.3IONS-SCNC: 15 MMOL/L (ref 9–17)
AST SERPL-CCNC: 19 U/L
BACTERIA: ABNORMAL
BASOPHILS # BLD: 1 % (ref 0–2)
BASOPHILS ABSOLUTE: 0.08 K/UL (ref 0–0.2)
BILIRUB SERPL-MCNC: 0.46 MG/DL (ref 0.3–1.2)
BILIRUBIN URINE: NEGATIVE
BUN BLDV-MCNC: 9 MG/DL (ref 8–23)
BUN/CREAT BLD: ABNORMAL (ref 9–20)
CALCIUM SERPL-MCNC: 8.5 MG/DL (ref 8.6–10.4)
CASTS UA: ABNORMAL /LPF
CHLORIDE BLD-SCNC: 99 MMOL/L (ref 98–107)
CO2: 20 MMOL/L (ref 20–31)
COLOR: YELLOW
COMMENT UA: ABNORMAL
CREAT SERPL-MCNC: 0.7 MG/DL (ref 0.5–0.9)
CRYSTALS, UA: ABNORMAL /HPF
DIFFERENTIAL TYPE: ABNORMAL
EOSINOPHILS RELATIVE PERCENT: 0 % (ref 0–4)
EPITHELIAL CELLS UA: ABNORMAL /HPF
GFR AFRICAN AMERICAN: >60 ML/MIN
GFR NON-AFRICAN AMERICAN: >60 ML/MIN
GFR SERPL CREATININE-BSD FRML MDRD: ABNORMAL ML/MIN/{1.73_M2}
GFR SERPL CREATININE-BSD FRML MDRD: ABNORMAL ML/MIN/{1.73_M2}
GLUCOSE BLD-MCNC: 117 MG/DL (ref 70–99)
GLUCOSE URINE: NEGATIVE
HCT VFR BLD CALC: 26.6 % (ref 36–46)
HEMOGLOBIN: 7.9 G/DL (ref 12–16)
IMMATURE GRANULOCYTES: ABNORMAL %
KETONES, URINE: NEGATIVE
LACTIC ACID: 1.2 MMOL/L (ref 0.5–2.2)
LEUKOCYTE ESTERASE, URINE: NEGATIVE
LYMPHOCYTES # BLD: 12 % (ref 24–44)
MCH RBC QN AUTO: 18.9 PG (ref 26–34)
MCHC RBC AUTO-ENTMCNC: 29.6 G/DL (ref 31–37)
MCV RBC AUTO: 64 FL (ref 80–100)
MONOCYTES # BLD: 10 % (ref 1–7)
MORPHOLOGY: ABNORMAL
MUCUS: ABNORMAL
NITRITE, URINE: NEGATIVE
NRBC AUTOMATED: ABNORMAL PER 100 WBC
OTHER OBSERVATIONS UA: ABNORMAL
PARTIAL THROMBOPLASTIN TIME: 35.2 SEC (ref 24–36)
PDW BLD-RTO: 20 % (ref 11.5–14.9)
PH UA: 6.5 (ref 5–8)
PLATELET # BLD: 312 K/UL (ref 150–450)
PLATELET ESTIMATE: ABNORMAL
PMV BLD AUTO: 7.9 FL (ref 6–12)
POTASSIUM SERPL-SCNC: 4.1 MMOL/L (ref 3.7–5.3)
PROTEIN UA: ABNORMAL
RBC # BLD: 4.15 M/UL (ref 4–5.2)
RBC # BLD: ABNORMAL 10*6/UL
RBC UA: ABNORMAL /HPF
RENAL EPITHELIAL, UA: ABNORMAL /HPF
SEG NEUTROPHILS: 77 % (ref 36–66)
SEGMENTED NEUTROPHILS ABSOLUTE COUNT: 6.08 K/UL (ref 1.3–9.1)
SODIUM BLD-SCNC: 134 MMOL/L (ref 135–144)
SPECIFIC GRAVITY UA: 1.01 (ref 1–1.03)
TOTAL PROTEIN: 7.6 G/DL (ref 6.4–8.3)
TRICHOMONAS: ABNORMAL
TROPONIN INTERP: NORMAL
TROPONIN INTERP: NORMAL
TROPONIN T: NORMAL NG/ML
TROPONIN T: NORMAL NG/ML
TROPONIN, HIGH SENSITIVITY: 12 NG/L (ref 0–14)
TROPONIN, HIGH SENSITIVITY: 12 NG/L (ref 0–14)
TURBIDITY: CLEAR
URINE HGB: ABNORMAL
UROBILINOGEN, URINE: NORMAL
WBC # BLD: 7.9 K/UL (ref 3.5–11)
WBC # BLD: ABNORMAL 10*3/UL
WBC UA: ABNORMAL /HPF
YEAST: ABNORMAL

## 2019-06-06 PROCEDURE — 96375 TX/PRO/DX INJ NEW DRUG ADDON: CPT

## 2019-06-06 PROCEDURE — 96365 THER/PROPH/DIAG IV INF INIT: CPT

## 2019-06-06 PROCEDURE — 85730 THROMBOPLASTIN TIME PARTIAL: CPT

## 2019-06-06 PROCEDURE — 84484 ASSAY OF TROPONIN QUANT: CPT

## 2019-06-06 PROCEDURE — 6370000000 HC RX 637 (ALT 250 FOR IP): Performed by: INTERNAL MEDICINE

## 2019-06-06 PROCEDURE — 87040 BLOOD CULTURE FOR BACTERIA: CPT

## 2019-06-06 PROCEDURE — 81001 URINALYSIS AUTO W/SCOPE: CPT

## 2019-06-06 PROCEDURE — 2580000003 HC RX 258: Performed by: EMERGENCY MEDICINE

## 2019-06-06 PROCEDURE — 36415 COLL VENOUS BLD VENIPUNCTURE: CPT

## 2019-06-06 PROCEDURE — 6360000002 HC RX W HCPCS: Performed by: EMERGENCY MEDICINE

## 2019-06-06 PROCEDURE — 83605 ASSAY OF LACTIC ACID: CPT

## 2019-06-06 PROCEDURE — 2060000000 HC ICU INTERMEDIATE R&B

## 2019-06-06 PROCEDURE — 85025 COMPLETE CBC W/AUTO DIFF WBC: CPT

## 2019-06-06 PROCEDURE — 80053 COMPREHEN METABOLIC PANEL: CPT

## 2019-06-06 PROCEDURE — 6370000000 HC RX 637 (ALT 250 FOR IP): Performed by: EMERGENCY MEDICINE

## 2019-06-06 PROCEDURE — 99285 EMERGENCY DEPT VISIT HI MDM: CPT

## 2019-06-06 PROCEDURE — 71045 X-RAY EXAM CHEST 1 VIEW: CPT

## 2019-06-06 PROCEDURE — 93005 ELECTROCARDIOGRAM TRACING: CPT | Performed by: EMERGENCY MEDICINE

## 2019-06-06 RX ORDER — PANTOPRAZOLE SODIUM 40 MG/1
40 TABLET, DELAYED RELEASE ORAL
Status: DISCONTINUED | OUTPATIENT
Start: 2019-06-07 | End: 2019-06-12 | Stop reason: HOSPADM

## 2019-06-06 RX ORDER — ASPIRIN 81 MG/1
81 TABLET ORAL DAILY
Status: DISCONTINUED | OUTPATIENT
Start: 2019-06-06 | End: 2019-06-09

## 2019-06-06 RX ORDER — HEPARIN SODIUM 5000 [USP'U]/ML
80 INJECTION, SOLUTION INTRAVENOUS; SUBCUTANEOUS ONCE
Status: COMPLETED | OUTPATIENT
Start: 2019-06-06 | End: 2019-06-06

## 2019-06-06 RX ORDER — POLYETHYLENE GLYCOL 3350 17 G/17G
17 POWDER, FOR SOLUTION ORAL DAILY PRN
Status: DISCONTINUED | OUTPATIENT
Start: 2019-06-06 | End: 2019-06-12 | Stop reason: HOSPADM

## 2019-06-06 RX ORDER — LORAZEPAM 1 MG/1
1 TABLET ORAL EVERY 8 HOURS PRN
Status: DISCONTINUED | OUTPATIENT
Start: 2019-06-06 | End: 2019-06-12 | Stop reason: HOSPADM

## 2019-06-06 RX ORDER — ONDANSETRON 2 MG/ML
4 INJECTION INTRAMUSCULAR; INTRAVENOUS ONCE
Status: COMPLETED | OUTPATIENT
Start: 2019-06-06 | End: 2019-06-06

## 2019-06-06 RX ORDER — 0.9 % SODIUM CHLORIDE 0.9 %
1000 INTRAVENOUS SOLUTION INTRAVENOUS ONCE
Status: COMPLETED | OUTPATIENT
Start: 2019-06-06 | End: 2019-06-06

## 2019-06-06 RX ORDER — LORAZEPAM 1 MG/1
1 TABLET ORAL EVERY 8 HOURS PRN
COMMUNITY
End: 2019-09-14 | Stop reason: SDUPTHER

## 2019-06-06 RX ORDER — ALBUTEROL SULFATE 2.5 MG/3ML
2.5 SOLUTION RESPIRATORY (INHALATION)
Status: DISCONTINUED | OUTPATIENT
Start: 2019-06-06 | End: 2019-06-12 | Stop reason: HOSPADM

## 2019-06-06 RX ORDER — TRAMADOL HYDROCHLORIDE 50 MG/1
50 TABLET ORAL 2 TIMES DAILY PRN
COMMUNITY
End: 2019-09-14 | Stop reason: SDUPTHER

## 2019-06-06 RX ORDER — POLYETHYLENE GLYCOL 3350 17 G/17G
17 POWDER, FOR SOLUTION ORAL DAILY PRN
Status: DISCONTINUED | OUTPATIENT
Start: 2019-06-06 | End: 2019-06-06

## 2019-06-06 RX ORDER — MORPHINE SULFATE 2 MG/ML
2 INJECTION, SOLUTION INTRAMUSCULAR; INTRAVENOUS ONCE
Status: COMPLETED | OUTPATIENT
Start: 2019-06-06 | End: 2019-06-06

## 2019-06-06 RX ORDER — HEPARIN SODIUM 10000 [USP'U]/100ML
18 INJECTION, SOLUTION INTRAVENOUS CONTINUOUS
Status: DISCONTINUED | OUTPATIENT
Start: 2019-06-06 | End: 2019-06-07

## 2019-06-06 RX ORDER — FUROSEMIDE 40 MG/1
40 TABLET ORAL DAILY
Status: DISCONTINUED | OUTPATIENT
Start: 2019-06-06 | End: 2019-06-07

## 2019-06-06 RX ORDER — POLYVINYL ALCOHOL 14 MG/ML
2 SOLUTION/ DROPS OPHTHALMIC PRN
Status: DISCONTINUED | OUTPATIENT
Start: 2019-06-06 | End: 2019-06-12 | Stop reason: HOSPADM

## 2019-06-06 RX ORDER — HEPARIN SODIUM 5000 [USP'U]/ML
80 INJECTION, SOLUTION INTRAVENOUS; SUBCUTANEOUS PRN
Status: DISCONTINUED | OUTPATIENT
Start: 2019-06-06 | End: 2019-06-07

## 2019-06-06 RX ORDER — ACETAMINOPHEN 325 MG/1
650 TABLET ORAL ONCE
Status: COMPLETED | OUTPATIENT
Start: 2019-06-06 | End: 2019-06-06

## 2019-06-06 RX ORDER — SODIUM CHLORIDE 9 MG/ML
INJECTION, SOLUTION INTRAVENOUS CONTINUOUS
Status: DISCONTINUED | OUTPATIENT
Start: 2019-06-06 | End: 2019-06-07

## 2019-06-06 RX ORDER — HEPARIN SODIUM 5000 [USP'U]/ML
40 INJECTION, SOLUTION INTRAVENOUS; SUBCUTANEOUS PRN
Status: DISCONTINUED | OUTPATIENT
Start: 2019-06-06 | End: 2019-06-07

## 2019-06-06 RX ORDER — TRAMADOL HYDROCHLORIDE 50 MG/1
50 TABLET ORAL EVERY 4 HOURS PRN
Status: DISCONTINUED | OUTPATIENT
Start: 2019-06-06 | End: 2019-06-12 | Stop reason: HOSPADM

## 2019-06-06 RX ORDER — SODIUM CHLORIDE 0.9 % (FLUSH) 0.9 %
10 SYRINGE (ML) INJECTION EVERY 12 HOURS SCHEDULED
Status: DISCONTINUED | OUTPATIENT
Start: 2019-06-06 | End: 2019-06-12 | Stop reason: HOSPADM

## 2019-06-06 RX ORDER — LISINOPRIL 10 MG/1
10 TABLET ORAL DAILY
Status: DISCONTINUED | OUTPATIENT
Start: 2019-06-06 | End: 2019-06-12 | Stop reason: HOSPADM

## 2019-06-06 RX ORDER — ACETAMINOPHEN 325 MG/1
650 TABLET ORAL EVERY 4 HOURS PRN
Status: DISCONTINUED | OUTPATIENT
Start: 2019-06-06 | End: 2019-06-12 | Stop reason: HOSPADM

## 2019-06-06 RX ORDER — ACETAMINOPHEN 325 MG/1
650 TABLET ORAL EVERY 6 HOURS PRN
Status: DISCONTINUED | OUTPATIENT
Start: 2019-06-06 | End: 2019-06-06 | Stop reason: SDUPTHER

## 2019-06-06 RX ORDER — ONDANSETRON 2 MG/ML
4 INJECTION INTRAMUSCULAR; INTRAVENOUS EVERY 6 HOURS PRN
Status: DISCONTINUED | OUTPATIENT
Start: 2019-06-06 | End: 2019-06-12 | Stop reason: HOSPADM

## 2019-06-06 RX ORDER — SODIUM CHLORIDE 0.9 % (FLUSH) 0.9 %
10 SYRINGE (ML) INJECTION PRN
Status: DISCONTINUED | OUTPATIENT
Start: 2019-06-06 | End: 2019-06-12 | Stop reason: HOSPADM

## 2019-06-06 RX ORDER — IPRATROPIUM BROMIDE AND ALBUTEROL SULFATE 2.5; .5 MG/3ML; MG/3ML
1 SOLUTION RESPIRATORY (INHALATION)
Status: DISCONTINUED | OUTPATIENT
Start: 2019-06-07 | End: 2019-06-12 | Stop reason: HOSPADM

## 2019-06-06 RX ORDER — POLYETHYLENE GLYCOL 3350 17 G/17G
17 POWDER, FOR SOLUTION ORAL DAILY PRN
COMMUNITY
End: 2021-06-15 | Stop reason: SDUPTHER

## 2019-06-06 RX ADMIN — HEPARIN SODIUM 18 UNITS/KG/HR: 10000 INJECTION, SOLUTION INTRAVENOUS at 16:17

## 2019-06-06 RX ADMIN — ONDANSETRON 4 MG: 2 INJECTION INTRAMUSCULAR; INTRAVENOUS at 14:49

## 2019-06-06 RX ADMIN — MORPHINE SULFATE 2 MG: 2 INJECTION, SOLUTION INTRAMUSCULAR; INTRAVENOUS at 14:50

## 2019-06-06 RX ADMIN — CEFTRIAXONE SODIUM 1 G: 1 INJECTION, POWDER, FOR SOLUTION INTRAMUSCULAR; INTRAVENOUS at 15:23

## 2019-06-06 RX ADMIN — SODIUM CHLORIDE 1000 ML: 9 INJECTION, SOLUTION INTRAVENOUS at 14:49

## 2019-06-06 RX ADMIN — AZITHROMYCIN MONOHYDRATE 500 MG: 500 INJECTION, POWDER, LYOPHILIZED, FOR SOLUTION INTRAVENOUS at 15:46

## 2019-06-06 RX ADMIN — HEPARIN SODIUM 6000 UNITS: 5000 INJECTION INTRAVENOUS; SUBCUTANEOUS at 16:16

## 2019-06-06 RX ADMIN — LORAZEPAM 1 MG: 1 TABLET ORAL at 20:53

## 2019-06-06 RX ADMIN — ACETAMINOPHEN 650 MG: 325 TABLET, FILM COATED ORAL at 14:36

## 2019-06-06 RX ADMIN — TRAMADOL HYDROCHLORIDE 50 MG: 50 TABLET, FILM COATED ORAL at 20:54

## 2019-06-06 ASSESSMENT — ENCOUNTER SYMPTOMS
COUGH: 1
EYES NEGATIVE: 1
VOMITING: 0
SHORTNESS OF BREATH: 0
NAUSEA: 1
DIARRHEA: 0
ABDOMINAL PAIN: 0
BACK PAIN: 0

## 2019-06-06 ASSESSMENT — PAIN DESCRIPTION - PAIN TYPE
TYPE: ACUTE PAIN
TYPE: ACUTE PAIN

## 2019-06-06 ASSESSMENT — PAIN SCALES - GENERAL
PAINLEVEL_OUTOF10: 7
PAINLEVEL_OUTOF10: 0
PAINLEVEL_OUTOF10: 10
PAINLEVEL_OUTOF10: 10
PAINLEVEL_OUTOF10: 3
PAINLEVEL_OUTOF10: 10
PAINLEVEL_OUTOF10: 3

## 2019-06-06 ASSESSMENT — PAIN DESCRIPTION - LOCATION: LOCATION: HEAD

## 2019-06-06 NOTE — ED NOTES
Report given to Marjorie Cummings RN from [facility]. Report method by phone   The following was reviewed with receiving RN:   Current vital signs:  BP (!) 121/91   Pulse 92   Temp 98 °F (36.7 °C)   Resp 16   Ht 5' 7\" (1.702 m)   Wt 165 lb (74.8 kg)   SpO2 93%   BMI 25.84 kg/m²                MEWS Score: 1     Any medication or safety alerts were reviewed. Any pending diagnostics and notifications were also reviewed, as well as any safety concerns or issues, abnormal labs, abnormal imaging, and abnormal assessment findings. Questions were answered.             Paloma Han RN  06/06/19 9016

## 2019-06-06 NOTE — ED PROVIDER NOTES
EMERGENCY DEPARTMENT ENCOUNTER    Pt Name: Kevin De  MRN: [id number]  [place] 1940  Date of evaluation: 6/6/19  CHIEF COMPLAINT       Chief Complaint   Patient presents with    Nausea    Headache    Fatigue     HISTORY OF PRESENT ILLNESS   The pt presents for evaluation of nausea, headache, myalgias, chills, increased urinary frequency and cough. The history is provided by the patient. Fever   Temp source:  Subjective  Severity:  Moderate  Onset quality:  Gradual  Duration:  1 week  Timing:  Intermittent  Progression:  Waxing and waning  Chronicity:  New  Relieved by:  Nothing  Worsened by:  Nothing  Associated symptoms: chills, cough, headaches and nausea    Associated symptoms: no chest pain, no congestion, no diarrhea, no dysuria, no rash and no vomiting        REVIEW OF SYSTEMS     Review of Systems   Constitutional: Positive for chills and fever. HENT: Negative. Negative for congestion. Eyes: Negative. Respiratory: Positive for cough. Negative for shortness of breath. Cardiovascular: Negative. Negative for chest pain. Gastrointestinal: Positive for nausea. Negative for abdominal pain, diarrhea and vomiting. Genitourinary: Positive for frequency. Negative for dysuria. Musculoskeletal: Negative. Negative for back pain. Skin: Negative. Negative for rash. Neurological: Positive for headaches. All other systems reviewed and are negative.     PASTMEDICAL HISTORY     Past Medical History:   Diagnosis Date    Anemia     Anxiety     Arthritis     Bell's palsy     Bright red blood per rectum     Cataracts, bilateral     DDD (degenerative disc disease)     Gastric ulcer 2014    per egd    Hepatitis 1965    Hyperlipidemia     Hypertension     Inguinal hernia     MRSA (methicillin resistant staph aureus) culture positive 02/22/2017    urine    Osteoarthritis      SURGICAL HISTORY       Past Surgical History:   Procedure Laterality Date    BLADDER SUSPENSION  2000    CATARACT REMOVAL Left 2009    left eye    COLONOSCOPY  9/10/14    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 DIVERTICULOSIS    EYE SURGERY      left eye lift    EYE SURGERY Left     Cataracts    INGUINAL HERNIA REPAIR      PARTIAL HYSTERECTOMY  1986    UPPER GASTROINTESTINAL ENDOSCOPY  2014    gastric ulcer with visible vessel    UPPER GASTROINTESTINAL ENDOSCOPY  9/10/14    HEALED ULCER     CURRENT MEDICATIONS       Current Discharge Medication List      CONTINUE these medications which have NOT CHANGED    Details   polyethylene glycol (GLYCOLAX) powder Take 17 g by mouth daily as needed (constipation)      LORazepam (ATIVAN) 1 MG tablet Take 1 mg by mouth every 8 hours as needed for Anxiety. traMADol (ULTRAM) 50 MG tablet Take 50 mg by mouth 2 times daily as needed for Pain. omeprazole (PRILOSEC) 20 MG delayed release capsule TAKE 1 CAPSULE BY MOUTH EVERY DAY  Qty: 90 capsule, Refills: 2      lisinopril (PRINIVIL;ZESTRIL) 10 MG tablet TAKE 1 TABLET BY MOUTH DAILY  Qty: 90 tablet, Refills: 3    Associated Diagnoses: Essential hypertension      aspirin 81 MG EC tablet Take 81 mg by mouth daily    Associated Diagnoses: Essential hypertension      furosemide (LASIX) 40 MG tablet Take 40 mg by mouth daily     Associated Diagnoses: Essential hypertension      polyvinyl alcohol (LIQUIFILM TEARS) 1.4 % ophthalmic solution Place 2 drops into both eyes as needed    Associated Diagnoses: Dry eyes, bilateral      acetaminophen (TYLENOL) 325 MG tablet Take 650 mg by mouth every 6 hours as needed for Pain. ALLERGIES     is allergic to bee pollen and iron. FAMILY HISTORY     indicated that her mother is . She indicated that her father is . She indicated that her brother is . She indicated that her paternal grandmother is .      SOCIAL HISTORY       Social History     Tobacco Use    Smoking status: Former Smoker     Packs/day: 0.50     Years: 50.00     Pack years: 25.00    the radiologist, see reports below, unless otherwisenoted in MDM or here. XR CHEST PORTABLE   Final Result   Infiltrate at the right lung base. Recommend chest radiograph after   treatment to ensure resolution. LABS: All lab results were reviewed by myself, and all abnormals are listed below.   Labs Reviewed   COMPREHENSIVE METABOLIC PANEL - Abnormal; Notable for the following components:       Result Value    Glucose 117 (*)     Calcium 8.5 (*)     Sodium 134 (*)     All other components within normal limits   CBC WITH AUTO DIFFERENTIAL - Abnormal; Notable for the following components:    Hemoglobin 7.9 (*)     Hematocrit 26.6 (*)     MCV 64.0 (*)     MCH 18.9 (*)     MCHC 29.6 (*)     RDW 20.0 (*)     Seg Neutrophils 77 (*)     Lymphocytes 12 (*)     Monocytes 10 (*)     Absolute Lymph # 0.95 (*)     All other components within normal limits   URINE RT REFLEX TO CULTURE - Abnormal; Notable for the following components:    Urine Hgb SMALL (*)     Protein, UA TRACE (*)     All other components within normal limits   MICROSCOPIC URINALYSIS - Abnormal; Notable for the following components:    Bacteria, UA FEW (*)     All other components within normal limits   CULTURE BLOOD #1   CULTURE BLOOD #1   LACTIC ACID   TROPONIN   TROPONIN   APTT   PERIPHERAL BLOOD SMEAR, PATH REVIEW   CBC       EMERGENCY DEPARTMENTCOURSE:         Vitals:    Vitals:    06/06/19 1550 06/06/19 1551 06/06/19 1628 06/06/19 1719   BP: 108/63  (!) 121/91 87/63   Pulse:  98 92 86   Resp:  22 16 18   Temp:   98 °F (36.7 °C) 97.9 °F (36.6 °C)   TempSrc:    Oral   SpO2:  93% 93% 97%   Weight:    167 lb 5.3 oz (75.9 kg)   Height:    5' 7\" (1.702 m)       The patient was given the following medications while in the emergency department:  Orders Placed This Encounter   Medications    0.9 % sodium chloride bolus    acetaminophen (TYLENOL) tablet 650 mg    morphine (PF) injection 2 mg    ondansetron (ZOFRAN) injection 4 mg    cefTRIAXone

## 2019-06-06 NOTE — ED NOTES
Pt arrives to ED c/o \"being sick. \" Patient states that she thinks that she is dehydrated. Patient states that since last Thursday she \"has not been able to hold my water. \" Patient denies nausea, vomiting or diarrhea. Patient states that she has had increased urination. Patient denies dysuria. Patient states that she has had a headache sine last Thursday as well. Patient states that it has gotten worse over the weekend. Patient also c/o \"arthritis pain\" all over. Patients' daughter states that patient gets \"eye injections\" every Thursday and daughter states that after her last injection patient has been c/o her eyes hurting. Patient denies chest pain or shortness of breath. Patient resting on stretcher no s/s of distress. Pt is A&Ox4, eupneic, PWD. GCS=15. Call light in reach.       Montserrat Daniels RN  06/06/19 3312

## 2019-06-06 NOTE — PROGRESS NOTES
Medication History completed:    New medications: lorazepam, tramadol    Medications discontinued: sertraline    Changes to dosing: polyethylene glycol changed to daily as needed    Stated allergies: As listed    Other pertinent information: Medications confirmed with [facility]. The patient states she hasn't taken any oral medications for about a week as she has felt too ill to take them.      Thank you,  Sandeep Sharif, PharmD  [phone]

## 2019-06-06 NOTE — ED NOTES
Code S called at [phone].       Ivonne [facility]  06/06/19 3252

## 2019-06-07 ENCOUNTER — APPOINTMENT (OUTPATIENT)
Dept: GENERAL RADIOLOGY | Age: 79
DRG: 194 | End: 2019-06-07
Payer: MEDICARE

## 2019-06-07 PROBLEM — I48.91 ATRIAL FIBRILLATION WITH RAPID VENTRICULAR RESPONSE (HCC): Status: ACTIVE | Noted: 2019-06-07

## 2019-06-07 LAB
ANION GAP SERPL CALCULATED.3IONS-SCNC: 12 MMOL/L (ref 9–17)
ANTI-XA UNFRAC HEPARIN: 0.37 IU/L (ref 0.3–0.7)
ANTI-XA UNFRAC HEPARIN: 0.37 IU/L (ref 0.3–0.7)
ANTI-XA UNFRAC HEPARIN: 0.45 IU/L (ref 0.3–0.7)
ANTI-XA UNFRAC HEPARIN: 0.46 IU/L (ref 0.3–0.7)
ANTI-XA UNFRAC HEPARIN: 0.48 IU/L (ref 0.3–0.7)
BUN BLDV-MCNC: 11 MG/DL (ref 8–23)
BUN/CREAT BLD: ABNORMAL (ref 9–20)
CALCIUM SERPL-MCNC: 8 MG/DL (ref 8.6–10.4)
CHLORIDE BLD-SCNC: 99 MMOL/L (ref 98–107)
CHOLESTEROL/HDL RATIO: 2.6
CHOLESTEROL: 98 MG/DL
CO2: 21 MMOL/L (ref 20–31)
CREAT SERPL-MCNC: 0.67 MG/DL (ref 0.5–0.9)
EKG ATRIAL RATE: 115 BPM
EKG Q-T INTERVAL: 306 MS
EKG QRS DURATION: 70 MS
EKG QTC CALCULATION (BAZETT): 423 MS
EKG R AXIS: 7 DEGREES
EKG T AXIS: -20 DEGREES
EKG VENTRICULAR RATE: 115 BPM
ESTIMATED AVERAGE GLUCOSE: 126 MG/DL
GFR AFRICAN AMERICAN: >60 ML/MIN
GFR NON-AFRICAN AMERICAN: >60 ML/MIN
GFR SERPL CREATININE-BSD FRML MDRD: ABNORMAL ML/MIN/{1.73_M2}
GFR SERPL CREATININE-BSD FRML MDRD: ABNORMAL ML/MIN/{1.73_M2}
GLUCOSE BLD-MCNC: 111 MG/DL (ref 70–99)
HBA1C MFR BLD: 6 % (ref 4–6)
HCT VFR BLD CALC: 24.1 % (ref 36–46)
HDLC SERPL-MCNC: 37 MG/DL
HEMOGLOBIN: 7.1 G/DL (ref 12–16)
LDL CHOLESTEROL: 47 MG/DL (ref 0–130)
LV EF: 65 %
LVEF MODALITY: NORMAL
MCH RBC QN AUTO: 19.2 PG (ref 26–34)
MCHC RBC AUTO-ENTMCNC: 29.7 G/DL (ref 31–37)
MCV RBC AUTO: 64.7 FL (ref 80–100)
NRBC AUTOMATED: ABNORMAL PER 100 WBC
PATHOLOGIST REVIEW: NORMAL
PDW BLD-RTO: 19.8 % (ref 11.5–14.9)
PLATELET # BLD: 263 K/UL (ref 150–450)
PMV BLD AUTO: 7.3 FL (ref 6–12)
POTASSIUM SERPL-SCNC: 3.6 MMOL/L (ref 3.7–5.3)
RBC # BLD: 3.72 M/UL (ref 4–5.2)
SODIUM BLD-SCNC: 132 MMOL/L (ref 135–144)
TRIGL SERPL-MCNC: 69 MG/DL
TSH SERPL DL<=0.05 MIU/L-ACNC: 1.94 MIU/L (ref 0.3–5)
VLDLC SERPL CALC-MCNC: ABNORMAL MG/DL (ref 1–30)
WBC # BLD: 6.3 K/UL (ref 3.5–11)

## 2019-06-07 PROCEDURE — 94761 N-INVAS EAR/PLS OXIMETRY MLT: CPT

## 2019-06-07 PROCEDURE — 80048 BASIC METABOLIC PNL TOTAL CA: CPT

## 2019-06-07 PROCEDURE — 85520 HEPARIN ASSAY: CPT

## 2019-06-07 PROCEDURE — 6370000000 HC RX 637 (ALT 250 FOR IP): Performed by: NURSE PRACTITIONER

## 2019-06-07 PROCEDURE — 6360000002 HC RX W HCPCS: Performed by: EMERGENCY MEDICINE

## 2019-06-07 PROCEDURE — 2580000003 HC RX 258: Performed by: NURSE PRACTITIONER

## 2019-06-07 PROCEDURE — 99223 1ST HOSP IP/OBS HIGH 75: CPT | Performed by: INTERNAL MEDICINE

## 2019-06-07 PROCEDURE — 6370000000 HC RX 637 (ALT 250 FOR IP): Performed by: INTERNAL MEDICINE

## 2019-06-07 PROCEDURE — 83036 HEMOGLOBIN GLYCOSYLATED A1C: CPT

## 2019-06-07 PROCEDURE — 93010 ELECTROCARDIOGRAM REPORT: CPT | Performed by: INTERNAL MEDICINE

## 2019-06-07 PROCEDURE — 6360000002 HC RX W HCPCS: Performed by: INTERNAL MEDICINE

## 2019-06-07 PROCEDURE — 84443 ASSAY THYROID STIM HORMONE: CPT

## 2019-06-07 PROCEDURE — 94640 AIRWAY INHALATION TREATMENT: CPT

## 2019-06-07 PROCEDURE — 97166 OT EVAL MOD COMPLEX 45 MIN: CPT

## 2019-06-07 PROCEDURE — 80061 LIPID PANEL: CPT

## 2019-06-07 PROCEDURE — 2580000003 HC RX 258: Performed by: INTERNAL MEDICINE

## 2019-06-07 PROCEDURE — 97162 PT EVAL MOD COMPLEX 30 MIN: CPT

## 2019-06-07 PROCEDURE — 2060000000 HC ICU INTERMEDIATE R&B

## 2019-06-07 PROCEDURE — 36415 COLL VENOUS BLD VENIPUNCTURE: CPT

## 2019-06-07 PROCEDURE — 71046 X-RAY EXAM CHEST 2 VIEWS: CPT

## 2019-06-07 PROCEDURE — 93306 TTE W/DOPPLER COMPLETE: CPT

## 2019-06-07 PROCEDURE — 85027 COMPLETE CBC AUTOMATED: CPT

## 2019-06-07 RX ORDER — GUAIFENESIN 600 MG/1
600 TABLET, EXTENDED RELEASE ORAL 2 TIMES DAILY
Status: DISCONTINUED | OUTPATIENT
Start: 2019-06-07 | End: 2019-06-12 | Stop reason: HOSPADM

## 2019-06-07 RX ORDER — FUROSEMIDE 10 MG/ML
20 INJECTION INTRAMUSCULAR; INTRAVENOUS DAILY
Status: DISCONTINUED | OUTPATIENT
Start: 2019-06-08 | End: 2019-06-12 | Stop reason: HOSPADM

## 2019-06-07 RX ADMIN — TRAMADOL HYDROCHLORIDE 50 MG: 50 TABLET, FILM COATED ORAL at 10:40

## 2019-06-07 RX ADMIN — ACETAMINOPHEN 650 MG: 325 TABLET, FILM COATED ORAL at 18:39

## 2019-06-07 RX ADMIN — TRAMADOL HYDROCHLORIDE 50 MG: 50 TABLET, FILM COATED ORAL at 00:42

## 2019-06-07 RX ADMIN — Medication 10 ML: at 10:30

## 2019-06-07 RX ADMIN — AZITHROMYCIN MONOHYDRATE 500 MG: 500 INJECTION, POWDER, LYOPHILIZED, FOR SOLUTION INTRAVENOUS at 18:48

## 2019-06-07 RX ADMIN — HEPARIN SODIUM 18 UNITS/KG/HR: 10000 INJECTION, SOLUTION INTRAVENOUS at 11:02

## 2019-06-07 RX ADMIN — TRAMADOL HYDROCHLORIDE 50 MG: 50 TABLET, FILM COATED ORAL at 18:39

## 2019-06-07 RX ADMIN — LORAZEPAM 1 MG: 1 TABLET ORAL at 10:40

## 2019-06-07 RX ADMIN — Medication 1 LOZENGE: at 00:39

## 2019-06-07 RX ADMIN — LORAZEPAM 1 MG: 1 TABLET ORAL at 18:38

## 2019-06-07 RX ADMIN — LISINOPRIL 10 MG: 10 TABLET ORAL at 10:28

## 2019-06-07 RX ADMIN — IPRATROPIUM BROMIDE AND ALBUTEROL SULFATE 1 AMPULE: .5; 3 SOLUTION RESPIRATORY (INHALATION) at 12:10

## 2019-06-07 RX ADMIN — Medication 10 ML: at 17:06

## 2019-06-07 RX ADMIN — SODIUM CHLORIDE: 9 INJECTION, SOLUTION INTRAVENOUS at 18:53

## 2019-06-07 RX ADMIN — ACETAMINOPHEN 650 MG: 325 TABLET, FILM COATED ORAL at 00:39

## 2019-06-07 RX ADMIN — SODIUM CHLORIDE: 9 INJECTION, SOLUTION INTRAVENOUS at 05:14

## 2019-06-07 RX ADMIN — CEFTRIAXONE SODIUM 1 G: 1 INJECTION, POWDER, FOR SOLUTION INTRAMUSCULAR; INTRAVENOUS at 16:57

## 2019-06-07 RX ADMIN — IPRATROPIUM BROMIDE AND ALBUTEROL SULFATE 1 AMPULE: .5; 3 SOLUTION RESPIRATORY (INHALATION) at 08:24

## 2019-06-07 RX ADMIN — IPRATROPIUM BROMIDE AND ALBUTEROL SULFATE 1 AMPULE: .5; 3 SOLUTION RESPIRATORY (INHALATION) at 19:57

## 2019-06-07 RX ADMIN — GUAIFENESIN 600 MG: 600 TABLET, EXTENDED RELEASE ORAL at 10:29

## 2019-06-07 RX ADMIN — IPRATROPIUM BROMIDE AND ALBUTEROL SULFATE 1 AMPULE: .5; 3 SOLUTION RESPIRATORY (INHALATION) at 16:03

## 2019-06-07 RX ADMIN — ASPIRIN 81 MG: 81 TABLET, COATED ORAL at 10:29

## 2019-06-07 RX ADMIN — PANTOPRAZOLE SODIUM 40 MG: 40 TABLET, DELAYED RELEASE ORAL at 05:13

## 2019-06-07 ASSESSMENT — ENCOUNTER SYMPTOMS
WHEEZING: 0
SHORTNESS OF BREATH: 0
SORE THROAT: 1
BACK PAIN: 0
DIARRHEA: 0
NAUSEA: 1
ABDOMINAL PAIN: 0
COUGH: 0
CONSTIPATION: 0
VOMITING: 0

## 2019-06-07 ASSESSMENT — PAIN SCALES - GENERAL
PAINLEVEL_OUTOF10: 7
PAINLEVEL_OUTOF10: 7
PAINLEVEL_OUTOF10: 3
PAINLEVEL_OUTOF10: 1
PAINLEVEL_OUTOF10: 4
PAINLEVEL_OUTOF10: 0
PAINLEVEL_OUTOF10: 3

## 2019-06-07 NOTE — H&P
[facility]     HISTORY AND PHYSICAL EXAMINATION            Date:   6/7/2019  Patientname:  Taylor Lynn  Date of admission:  6/6/2019  2:06 PM  MRN:   [id number]  Account:  [de-identified]  YOB: 1940  PCP:    Atilio Toussaint MD  Room:   6227/0196-34  Code Status:    Full Code    CHIEF COMPLAINT     Chief Complaint   Patient presents with    Nausea    Headache    Fatigue     HISTORY OF PRESENT ILLNESS  (Character, Onset, Location, Duration,  Exacerbating/RelievingFactors, Radiation,   Associated Symptoms, Severity )      The patient is a 70 y.o.  female with a history of osteoarthritis, iron deficiency anemia, hypertension, and hyperlipidemia, who presents with fatigue, headache, and nausea. According to patient, fatigue has been present for the past month; however, reports that fatigue and other symptoms have significantly increased over the past week. Symptoms are associated with low-grade temp, chills, cough, and myalgias. Denies chest pain, abdominal pain, vomiting, diarrhea, and urinary symptoms. There are no aggravating or alleviating factors. Symptoms are reported as intermittent and progressively worsening. PAST MEDICAL HISTORY   Patient  has a past medical history of Anemia, Anxiety, Arthritis, Bell's palsy, Bright red blood per rectum, Cataracts, bilateral, DDD (degenerative disc disease), Gastric ulcer, Hepatitis, Hyperlipidemia, Hypertension, Inguinal hernia, MRSA (methicillin resistant staph aureus) culture positive, and Osteoarthritis. PAST SURGICAL HISTORY    Patient  has a past surgical history that includes partial hysterectomy (cervix not removed) (1986); bladder suspension (2000); Cataract removal (Left, 2009); Eye surgery; Upper gastrointestinal endoscopy (7/2014); Inguinal hernia repair (2001); Colonoscopy (9/10/14); Upper gastrointestinal endoscopy (9/10/14); and eye surgery (Left).      FAMILY HISTORY    Patient family history includes Arthritis in her mother; Cancer in her paternal grandmother; Cancer (age of onset: 61) in her brother; Heart Disease in her mother; High Blood Pressure in her mother. SOCIAL HISTORY    Patient  reports that she has quit smoking. She has a 25.00 pack-year smoking history. She has never used smokeless tobacco. She reports that she does not drink alcohol or use drugs. HOME MEDICATIONS        Prior to Admission medications    Medication Sig Start Date End Date Taking? Authorizing Provider   polyethylene glycol (GLYCOLAX) powder Take 17 g by mouth daily as needed (constipation)   Yes Historical Provider, MD   LORazepam (ATIVAN) 1 MG tablet Take 1 mg by mouth every 8 hours as needed for Anxiety. Yes Historical Provider, MD   traMADol (ULTRAM) 50 MG tablet Take 50 mg by mouth 2 times daily as needed for Pain. Yes Historical Provider, MD   omeprazole (PRILOSEC) 20 MG delayed release capsule TAKE 1 CAPSULE BY MOUTH EVERY DAY 5/28/19  Yes Kareem Clark MD   lisinopril (PRINIVIL;ZESTRIL) 10 MG tablet TAKE 1 TABLET BY MOUTH DAILY 2/27/19  Yes Kareem Clark MD   aspirin 81 MG EC tablet Take 81 mg by mouth daily   Yes Historical Provider, MD   furosemide (LASIX) 40 MG tablet Take 40 mg by mouth daily    Yes Historical Provider, MD   polyvinyl alcohol (LIQUIFILM TEARS) 1.4 % ophthalmic solution Place 2 drops into both eyes as needed   Yes Historical Provider, MD   acetaminophen (TYLENOL) 325 MG tablet Take 650 mg by mouth every 6 hours as needed for Pain. Yes Historical Provider, MD       ALLERGIES      Bee pollen and Iron    REVIEW OF SYSTEMS     Review of Systems   Constitutional: Positive for chills, fatigue and fever. Negative for diaphoresis. HENT: Positive for sore throat. Negative for congestion. Respiratory: Negative for cough, shortness of breath and wheezing. Cardiovascular: Negative for chest pain, palpitations and leg swelling. Gastrointestinal: Positive for nausea.  Negative for abdominal pain, constipation, diarrhea and vomiting. Genitourinary: Negative for dysuria, frequency and urgency. Musculoskeletal: Negative for back pain and myalgias. Skin: Negative for rash. Neurological: Positive for headaches. Negative for dizziness and weakness. Psychiatric/Behavioral: The patient is not nervous/anxious. PHYSICAL EXAM      BP (!) 109/52   Pulse 93   Temp 98.8 °F (37.1 °C) (Oral)   Resp 15   Ht 5' 7\" (1.702 m)   Wt 167 lb 5.3 oz (75.9 kg)   SpO2 96%   BMI 26.21 kg/m²  Body mass index is 26.21 kg/m². Physical Exam   Constitutional: She is oriented to person, place, and time. She appears well-developed and well-nourished. No distress. HENT:   Head: Normocephalic and atraumatic. Mouth/Throat: Oropharynx is clear and moist.   Eyes: Pupils are equal, round, and reactive to light. Conjunctivae and EOM are normal.   Neck: Normal range of motion. Neck supple. No tracheal deviation present. Cardiovascular: Regular rhythm and intact distal pulses. Exam reveals no gallop and no friction rub. No murmur heard. Heart tones tachycardic with irregularly irregular   Pulmonary/Chest: Effort normal and breath sounds normal. No respiratory distress. She has no wheezes. She has no rales. She exhibits no tenderness. Abdominal: Soft. Bowel sounds are normal. She exhibits no distension. There is no tenderness. There is no guarding. Musculoskeletal: Normal range of motion. She exhibits no edema or tenderness. Lymphadenopathy:     She has no cervical adenopathy. Neurological: She is alert and oriented to person, place, and time. She displays no seizure activity. Coordination normal.   Skin: Skin is warm and dry. No rash noted. She is not diaphoretic. No erythema. No pallor. Psychiatric: She has a normal mood and affect.  Her behavior is normal. Thought content normal.       DIAGNOSTICS      EKG:  (as documented in ED note):    Ekg, rate of 115, afib with rvr, nonspecific st seg changes, normal qrs, abnormal ekg. EKG 12 Lead [[id number]] Collected: 06/06/19 1459   Updated: 06/06/19 1502     Ventricular Rate 115 BPM    Atrial Rate 115 BPM    QRS Duration 70 ms    Q-T Interval 306 ms    QTc Calculation (Bazett) 423 ms    R Axis 7 degrees    T Axis -20 degrees   Narrative:     Accelerated Junctional rhythm with Premature supraventricular complexes  Nonspecific ST and T wave abnormality  Abnormal ECG  When compared with ECG of 08-JUL-2014 19:46,  Junctional rhythm has replaced Sinus rhythm  Non-specific change in ST segment in Inferior leads  ST now depressed in Lateral leads  Nonspecific T wave abnormality now evident in Anterolateral leads     Labs:  CBC:   Recent Labs     06/06/19  1425   WBC 7.9   HGB 7.9*        BMP:    Recent Labs     06/06/19  1425   *   K 4.1   CL 99   CO2 20   BUN 9   CREATININE 0.70   GLUCOSE 117*     S. Calcium:  Recent Labs     06/06/19  1425   CALCIUM 8.5*     S. Ionized Calcium:No results for input(s): IONCA in the last 72 hours. S. Magnesium:No results for input(s): MG in the last 72 hours. S. Phosphorus:No results for input(s): PHOS in the last 72 hours. S. Glucose:No results for input(s): POCGLU in the last 72 hours. Glycosylated hemoglobin A1C: No results found for: LABA1C  Hepatic:   Recent Labs     06/06/19  1425   AST 19   ALT 11   ALKPHOS 63     CARDIAC ENZY:   Recent Labs     06/06/19  1425 06/06/19  1605   TROPHS 12 12     INR: No results for input(s): INR in the last 72 hours. BNP: No results for input(s): PROBNP in the last 72 hours. ABGs: No results for input(s): PH, PCO2, PO2, HCO3, O2SAT in the last 72 hours. Lipids: No results for input(s): CHOL, TRIG, HDL, LDLCALC in the last 72 hours. Invalid input(s): LDL  Pancreatic functions:No results for input(s): LIPASE, AMYLASE in the last 72 hours.   S. LacticAcid:   Recent Labs     06/06/19  1425   LACTA 1.2     Thyroid functions:   Lab Results   Component Value Date    TSH 2.02 01/02/2019      U/A:  Recent Labs     06/06/19  1438   [address]   WBCUA 2 TO 5   RBCUA 0 TO 2   MUCUS NOT REPORTED   BACTERIA FEW*   SPECGRAV 1.007   LEUKOCYTESUR NEGATIVE   GLUCOSEU NEGATIVE   AMORPHOUS NOT REPORTED       Imaging/Diagonstics:     Xr Chest Portable    Result Date: 6/6/2019  EXAMINATION: ONE XRAY VIEW OF THE CHEST 6/6/2019 2:24 pm COMPARISON: Chest radiograph dated 07/08/2014 HISTORY: ORDERING SYSTEM PROVIDED HISTORY: Chest Pain TECHNOLOGIST PROVIDED HISTORY: Chest Pain Ordering Physician Provided Reason for Exam: Chest Pain Acuity: Unknown Type of Exam: Unknown FINDINGS: Heart size is normal considering AP technique. Infiltrate at the right lung base. No effusions. No pneumothorax. No free air below the diaphragm. Infiltrate at the right lung base. Recommend chest radiograph after treatment to ensure resolution. ASSESSMENT  and  PLAN     Principal Problem:    Pneumonia  Active Problems:    Atrial fibrillation with rapid ventricular response (HCC)  Resolved Problems:    * No resolved hospital problems. *    Plan:    Pneumonia   -IV Rcephin and Zithromax initiated in ED  --continue on admission  -Aerosols treatments  -Begin mucinex  -IVF at 75 ml/hr  -blood cultures x2  -Recheck CXR in am  -Pneumovax before discharge if applicable    Atrial fibrillation with RVR  -EKG in ED - new a. Fib with ventricular rate 115  -heparin gtt initiated  --no current sings of bleeding  -Rate control drugs not initiated d/t pneumonia  -monitor telemetry and VS  --Case management to investigate long-term anticoagulation options with insurance   -Check TSH, Lipid panel, & HgbA1c in am  -Check 2D echocardiogram  -Pain/nausea control    Consultations:     [facility], APRN - CNP   6/7/2019  5:55 AM    [address] Chaka Argueta [address]  [address]    Phone [phone]     IM Attending    Pt seen and examined today by me    I have discussed the care of pt, including pertinent history and exam findings,  with the NP [facility] . I have reviewed the key elements of all parts of the encounter with [facility].   I agree with the assessment, plan and orders as documented by [facility]    Pt with pneumonia   Noted to be in A fib - stop heparin echo noted  Use ASA   Hb is 7.1 with severe microcytic anemia need to r/o GI causes   Electronically signed by Jihan Sellers MD on 6/7/2019 at 10:30 PM

## 2019-06-07 NOTE — PLAN OF CARE
Problem: Falls - Risk of:  Goal: Absence of physical injury  Description  Absence of physical injury  Outcome: Met This Shift  Note:   Patient remains free of injury, no adverse reactions, no new complaints or concerns noted. Problem: Pain:  Goal: Control of acute pain  Description  Control of acute pain  Outcome: Met This Shift  Note:   Patient received tramidol with AM medications per patient request. Patient states taking tramidol daily in the Am. Patient verbalized no further complaints of pain or discomfort.       Problem: Cardiac Output - Decreased:  Goal: Hemodynamic stability will improve  Description  Hemodynamic stability will improve  Outcome: Met This Shift

## 2019-06-07 NOTE — PROGRESS NOTES
[address]   Occupational Therapy Evaluation  Date: 19  Patient Name: Nya Dietz       Room: 4157/8327-74  MRN: [id number]  Account: [de-identified]   : 1940  (79 y.o.) Gender: female     Discharge Recommendations:  Home with assist PRN  Equipment Needed: (To Be Determined )    Referring Practitioner: Dr Eda Bingham   Diagnosis: Pneumonia   Additional Pertinent Hx: Infrequent recurrent UTI     Treatment Diagnosis: Altered Tolerance to care demands   Past Medical History:  has a past medical history of Anemia, Anxiety, Arthritis, Bell's palsy, Bright red blood per rectum, Cataracts, bilateral, DDD (degenerative disc disease), Gastric ulcer, Hepatitis, Hyperlipidemia, Hypertension, Inguinal hernia, MRSA (methicillin resistant staph aureus) culture positive, and Osteoarthritis. Past Surgical History:   has a past surgical history that includes partial hysterectomy (cervix not removed) (); bladder suspension (); Cataract removal (Left, ); Eye surgery; Upper gastrointestinal endoscopy (2014); Inguinal hernia repair (); Colonoscopy (9/10/14); Upper gastrointestinal endoscopy (9/10/14); and eye surgery (Left).     Restrictions  Restrictions/Precautions: Fall Risk, Isolation(MRSA Urine )  Required Braces or Orthoses?: No     Vitals  Temp: 99.3 °F (37.4 °C)  Pulse: 99  Resp: 16  BP: (!) 112/50  Height: 5' 7\" (170.2 cm)  Weight: 167 lb 5.3 oz (75.9 kg)  BMI (Calculated): 26.3  Oxygen Therapy  SpO2: 92 %  Pulse Oximeter Device Mode: Intermittent  Pulse Oximeter Device Location: Finger  O2 Device: None (Room air)  Level of Consciousness: Alert    Subjective  Subjective: Alert  Overall Orientation Status: Within Functional Limits  Vision  Vision: Impaired  Vision Exceptions: Wears glasses at all times  Hearing  Hearing: Within functional limits  Social/Functional History  Lives With: Family(Grandson (22), Grand Daughter (16))  Type of Home: House  Home Layout: Two level, Bed/Bath upstairs, Laundry in basement  Home Access: Stairs to enter with rails  Entrance Stairs - Number of Steps: 5  Entrance Stairs - Rails: Both  Bathroom Shower/Tub: Tub/Shower unit, Curtain  Bathroom Toilet: Standard  Bathroom Equipment: Shower chair(shower chair does not safely fit in tub area so pt does not use)  Bathroom Accessibility: (Basement level or Upper level )  ADL Assistance: Independent  Homemaking Assistance: Independent  Homemaking Responsibilities: Yes  Ambulation Assistance: Independent  Transfer Assistance: Independent  Active : Yes  Mode of Transportation: Car  Occupation: Retired  Additional Comments: Pt states grandchildren are not helpful at home. States she does laundry and grocery shops. Pt requires grandchildren to carry groceries in home and laundry down to and up from basement. Pt states she does not have DME d/t not being able to afford it       Objective      Cognition  Overall Cognitive Status: WFL  Following Commands:  Follows one step commands with increased time  Attention Span: Difficulty dividing attention  Memory: Decreased recall of precautions(Delayed retrieval of information )  Safety Judgement: Decreased awareness of need for assistance  Cognition Comment: Discussing events around her admission and hospitlazation process       ADL  Feeding: Increased time to complete  Grooming: Minimal assistance(IV Left Hand )  UE Bathing: Minimal assistance(IV left hand )  LE Bathing: Contact guard assistance  UE Dressing: Stand by assistance  LE Dressing: Contact guard assistance  Toileting: Supervision    UE Function  Hand Dominance  Hand Dominance: Right        LUE Strength  Gross LUE Strength: WFL  L Hand General: 4/5     LUE Tone: Normotonic     LUE AROM (degrees)  LUE AROM : WFL     Left Hand AROM (degrees)  Left Hand AROM: WFL  RUE Strength  Gross RUE Strength: WFL  R Hand General: 4/5      RUE Tone: Normotonic     RUE AROM (degrees)  RUE AROM : WFL     Right Hand AROM Out: [facility]  Minutes: 19    Electronically signed by Nancy Arndt OT on 6/7/19 at 4:34 PM

## 2019-06-07 NOTE — PLAN OF CARE
Problem: Falls - Risk of:  Goal: Will remain free from falls  Description  Will remain free from falls  Outcome: Ongoing  Note:   Pt alert and oriented. Pt was instructed on how to use the call light. Pt used call light appropriately. Non-slip socks are in place. Bed is in lowest and locked position. No falls noted this shift. Problem: Pain:  Goal: Pain level will decrease  Description  Pain level will decrease  Outcome: Ongoing  Note:   Pt complained of pain and received medication. Patient expressed satisfaction.

## 2019-06-07 NOTE — PROGRESS NOTES
(Left). Restrictions  Restrictions/Precautions  Restrictions/Precautions: Fall Risk, Isolation  Required Braces or Orthoses?: No  Vision/Hearing  Vision: Impaired  Vision Exceptions: Wears glasses at all times  Hearing: Within functional limits     Subjective  General  Chart Reviewed: Yes  Patient assessed for rehabilitation services?: Yes  Additional Pertinent Hx: Pt presents to ER with worsening fatigue, nausea, HA. CXR: R infiltrate, also found to have new onset A fib  Response To Previous Treatment: Not applicable  Family / Caregiver Present: No  Diagnosis: impaired mobility d/t PNA  Follows Commands: Within Functional Limits  Subjective  Subjective: Pt is sitting upright in bed, states she has been ambulating to bathroom IND. RN ok's session  Pain Screening  Patient Currently in Pain: No  Vital Signs  Patient Currently in Pain: No  Pre Treatment Pain Screening  Intervention List: Patient able to continue with treatment    Orientation  Orientation  Overall Orientation Status: Within Normal Limits  Social/Functional History  Social/Functional History  Lives With: Family(19 yr old grandson and 12 yr old grand dtr live with pt. Aramis Dawn does not work)  Type of Home: [facility] [employer] Layout: Two level, Bed/Bath upstairs, Laundry in basement(also has toilet in basement)  Home Access: Stairs to enter with rails  Entrance Stairs - Number of Steps: 5  Entrance Stairs - Rails: Both  Bathroom Shower/Tub: Tub/Shower unit  Bathroom Toilet: Standard  Bathroom Equipment: Shower chair(shower chair does not safely fit in tub area so pt does not use)  ADL Assistance: [facility] [address]: Independent  Homemaking Responsibilities: Yes  Ambulation Assistance: Independent  Transfer Assistance: Independent  Active : Yes  Occupation: Retired  Additional Comments: Pt states grandchildren are not helpful at home. States she does laundry and grocery shops.  Pt requires grandchildren to carry groceries in home and laundry down to and up from basement. Pt states she does not have DME d/t not being able to afford it  Cognition        Objective     Observation/Palpation  Posture: Good  Observation: twitching L side of face  Edema: none    AROM RLE (degrees)  RLE AROM: WFL  AROM LLE (degrees)  LLE AROM : WFL  AROM RUE (degrees)  RUE AROM : WFL  AROM LUE (degrees)  LUE AROM : WFL  Strength RLE  Strength RLE: WFL  Strength LLE  Strength LLE: WFL  Strength RUE  Strength RUE: WFL  Strength LUE  Strength LUE: WFL  Strength Other  Other: Limited strength assessment d/t pt needing to be cleaned up in bathroom and transport taking pt to testing causing end of session  Tone RLE  RLE Tone: Normotonic  Tone LLE  LLE Tone: Normotonic  Sensation  Overall Sensation Status: WFL(denies)  Bed mobility  Supine to Sit: Modified independent(HOB up partially)  Scooting: Stand by assistance(towards EOB, pt noted to have some difficulty, although defers assistance)  Transfers  Sit to Stand: Independent  Stand to sit:  Independent  Comment: Pt is IND with toilet transfer and performing hygiene tasks  Ambulation  Ambulation?: Yes  Ambulation 1  Surface: level tile  Device: No Device  Assistance: Stand by assistance  Quality of Gait: slight unsteadiness, no LOB  Distance: 5ftx1, 12 ft x1  Comments: c/o fatigue, no other c/o  Stairs/Curb  Stairs?: No     Balance  Posture: Good  Sitting - Static: Good  Sitting - Dynamic: Good  Standing - Static: Fair;+  Standing - Dynamic: Fair;+        Plan   Plan  Times per week: 5-6x/wk  Times per day: Daily  Current Treatment Recommendations: Transfer Training, Endurance Training, Patient/Caregiver Education & Training, Balance Training, Gait Training, Stair training  Safety Devices  Type of devices: Left in chair(being taken by transport)  Restraints  Initially in place: No    G-Code       OutComes Score                                                  AM-PAC Score             Goals  Short term goals  Time Frame for Short term goals: 3 days  Short term goal 1:  Increase endurance to good to maximize mobility  Short term goal 2: Improve gait to  ft  Short term goal 3: Improve ability to ambulate up/down 14 steps to access home       Therapy Time   Individual Concurrent Group Co-treatment   Time In 0900         Time Out 0933         Minutes [id number] Donita Chow, PT

## 2019-06-08 LAB
ANION GAP SERPL CALCULATED.3IONS-SCNC: 12 MMOL/L (ref 9–17)
BUN BLDV-MCNC: 8 MG/DL (ref 8–23)
BUN/CREAT BLD: ABNORMAL (ref 9–20)
CALCIUM SERPL-MCNC: 8.3 MG/DL (ref 8.6–10.4)
CHLORIDE BLD-SCNC: 98 MMOL/L (ref 98–107)
CO2: 21 MMOL/L (ref 20–31)
CREAT SERPL-MCNC: 0.62 MG/DL (ref 0.5–0.9)
GFR AFRICAN AMERICAN: >60 ML/MIN
GFR NON-AFRICAN AMERICAN: >60 ML/MIN
GFR SERPL CREATININE-BSD FRML MDRD: ABNORMAL ML/MIN/{1.73_M2}
GFR SERPL CREATININE-BSD FRML MDRD: ABNORMAL ML/MIN/{1.73_M2}
GLUCOSE BLD-MCNC: 105 MG/DL (ref 70–99)
HCT VFR BLD CALC: 23.4 % (ref 36–46)
HEMOGLOBIN: 6.8 G/DL (ref 12–16)
MCH RBC QN AUTO: 18.9 PG (ref 26–34)
MCHC RBC AUTO-ENTMCNC: 29.3 G/DL (ref 31–37)
MCV RBC AUTO: 64.5 FL (ref 80–100)
NRBC AUTOMATED: ABNORMAL PER 100 WBC
PATHOLOGIST REVIEW: NORMAL
PDW BLD-RTO: 19.7 % (ref 11.5–14.9)
PLATELET # BLD: 259 K/UL (ref 150–450)
PMV BLD AUTO: 7.3 FL (ref 6–12)
POTASSIUM SERPL-SCNC: 4 MMOL/L (ref 3.7–5.3)
RBC # BLD: 3.63 M/UL (ref 4–5.2)
SODIUM BLD-SCNC: 131 MMOL/L (ref 135–144)
WBC # BLD: 4.2 K/UL (ref 3.5–11)

## 2019-06-08 PROCEDURE — 6360000002 HC RX W HCPCS: Performed by: INTERNAL MEDICINE

## 2019-06-08 PROCEDURE — 2580000003 HC RX 258: Performed by: INTERNAL MEDICINE

## 2019-06-08 PROCEDURE — 97530 THERAPEUTIC ACTIVITIES: CPT

## 2019-06-08 PROCEDURE — 94761 N-INVAS EAR/PLS OXIMETRY MLT: CPT

## 2019-06-08 PROCEDURE — 99232 SBSQ HOSP IP/OBS MODERATE 35: CPT | Performed by: INTERNAL MEDICINE

## 2019-06-08 PROCEDURE — 83874 ASSAY OF MYOGLOBIN: CPT

## 2019-06-08 PROCEDURE — 85027 COMPLETE CBC AUTOMATED: CPT

## 2019-06-08 PROCEDURE — 80048 BASIC METABOLIC PNL TOTAL CA: CPT

## 2019-06-08 PROCEDURE — 2060000000 HC ICU INTERMEDIATE R&B

## 2019-06-08 PROCEDURE — 6370000000 HC RX 637 (ALT 250 FOR IP): Performed by: INTERNAL MEDICINE

## 2019-06-08 PROCEDURE — 94640 AIRWAY INHALATION TREATMENT: CPT

## 2019-06-08 PROCEDURE — 36415 COLL VENOUS BLD VENIPUNCTURE: CPT

## 2019-06-08 PROCEDURE — 6370000000 HC RX 637 (ALT 250 FOR IP): Performed by: NURSE PRACTITIONER

## 2019-06-08 PROCEDURE — 84484 ASSAY OF TROPONIN QUANT: CPT

## 2019-06-08 PROCEDURE — 99222 1ST HOSP IP/OBS MODERATE 55: CPT | Performed by: INTERNAL MEDICINE

## 2019-06-08 RX ORDER — POLYETHYLENE GLYCOL 3350 17 G/17G
119 POWDER, FOR SOLUTION ORAL 2 TIMES DAILY
Status: DISCONTINUED | OUTPATIENT
Start: 2019-06-08 | End: 2019-06-09

## 2019-06-08 RX ORDER — DIGOXIN 0.25 MG/ML
250 INJECTION INTRAMUSCULAR; INTRAVENOUS ONCE
Status: COMPLETED | OUTPATIENT
Start: 2019-06-08 | End: 2019-06-08

## 2019-06-08 RX ADMIN — GUAIFENESIN 600 MG: 600 TABLET, EXTENDED RELEASE ORAL at 20:28

## 2019-06-08 RX ADMIN — IPRATROPIUM BROMIDE AND ALBUTEROL SULFATE 1 AMPULE: .5; 3 SOLUTION RESPIRATORY (INHALATION) at 18:58

## 2019-06-08 RX ADMIN — CEFTRIAXONE SODIUM 1 G: 1 INJECTION, POWDER, FOR SOLUTION INTRAMUSCULAR; INTRAVENOUS at 19:06

## 2019-06-08 RX ADMIN — IPRATROPIUM BROMIDE AND ALBUTEROL SULFATE 1 AMPULE: .5; 3 SOLUTION RESPIRATORY (INHALATION) at 14:51

## 2019-06-08 RX ADMIN — DIGOXIN 250 MCG: 0.25 INJECTION INTRAMUSCULAR; INTRAVENOUS at 10:38

## 2019-06-08 RX ADMIN — TRAMADOL HYDROCHLORIDE 50 MG: 50 TABLET, FILM COATED ORAL at 23:58

## 2019-06-08 RX ADMIN — LORAZEPAM 1 MG: 1 TABLET ORAL at 04:53

## 2019-06-08 RX ADMIN — AZITHROMYCIN MONOHYDRATE 500 MG: 500 INJECTION, POWDER, LYOPHILIZED, FOR SOLUTION INTRAVENOUS at 20:29

## 2019-06-08 RX ADMIN — Medication 10 ML: at 20:28

## 2019-06-08 RX ADMIN — Medication 10 ML: at 10:42

## 2019-06-08 RX ADMIN — ENOXAPARIN SODIUM 40 MG: 40 INJECTION SUBCUTANEOUS at 20:40

## 2019-06-08 RX ADMIN — TRAMADOL HYDROCHLORIDE 50 MG: 50 TABLET, FILM COATED ORAL at 04:53

## 2019-06-08 RX ADMIN — GUAIFENESIN 600 MG: 600 TABLET, EXTENDED RELEASE ORAL at 10:38

## 2019-06-08 RX ADMIN — IPRATROPIUM BROMIDE AND ALBUTEROL SULFATE 1 AMPULE: .5; 3 SOLUTION RESPIRATORY (INHALATION) at 07:41

## 2019-06-08 RX ADMIN — IPRATROPIUM BROMIDE AND ALBUTEROL SULFATE 1 AMPULE: .5; 3 SOLUTION RESPIRATORY (INHALATION) at 11:25

## 2019-06-08 RX ADMIN — METOPROLOL TARTRATE 25 MG: 25 TABLET, FILM COATED ORAL at 20:28

## 2019-06-08 RX ADMIN — Medication 1 LOZENGE: at 23:58

## 2019-06-08 RX ADMIN — PANTOPRAZOLE SODIUM 40 MG: 40 TABLET, DELAYED RELEASE ORAL at 10:41

## 2019-06-08 ASSESSMENT — ENCOUNTER SYMPTOMS
DIARRHEA: 0
VOICE CHANGE: 0
VOMITING: 0
ABDOMINAL DISTENTION: 0
NAUSEA: 0
SORE THROAT: 0
TROUBLE SWALLOWING: 0
BLOOD IN STOOL: 0
COLOR CHANGE: 0
BACK PAIN: 0
CONSTIPATION: 0
COUGH: 1
SHORTNESS OF BREATH: 1
CHOKING: 0
EYES NEGATIVE: 1
ABDOMINAL PAIN: 0
WHEEZING: 0

## 2019-06-08 ASSESSMENT — PAIN SCALES - GENERAL
PAINLEVEL_OUTOF10: 8
PAINLEVEL_OUTOF10: 3
PAINLEVEL_OUTOF10: 1

## 2019-06-08 ASSESSMENT — PAIN DESCRIPTION - PAIN TYPE: TYPE: ACUTE PAIN

## 2019-06-08 NOTE — FLOWSHEET NOTE
06/08/19 1952   Provider Notification   Reason for Communication Review case   Provider Name Dr. Daniel Draper   Provider Notification Physician   Method of Communication Page   Response Other (Comment)   Notification Time 1952   Informed doctor that pt is refusing colonoscopy prep and the colonoscopy but is agreeable to have the EGD done.  No orders received

## 2019-06-08 NOTE — FLOWSHEET NOTE
Patient shared her medical history and said she's been blessed to have relatively good health. She is concerned about the fatigue she feels right now and hopes to get her energy back. Her grandsons live with her and she appreciates their help. She spoke of an active prayer life and appreciated praying together. 06/08/19 1226   Encounter Summary   Services provided to: Patient   Referral/Consult From: [address] [place] [address] Visiting   (6-8-19)   Complexity of Encounter Moderate   Length of Encounter 15 minutes   Spiritual Assessment Completed Yes   Routine   Type Initial   Spiritual/Christianity   Type Spiritual support   Assessment Calm; Approachable   Intervention Active listening;Explored feelings, thoughts, concerns;Explored coping resources;Nurtured hope;Prayer;Sustaining presence/ Ministry of presence; Discussed illness/injury and it's impact; Discussed belief system/Judaism practices/gina   Outcome Expressed gratitude;Engaged in conversation;Expressed feelings/needs/concerns;Receptive

## 2019-06-08 NOTE — FLOWSHEET NOTE
06/08/19 1130   Provider Notification   Reason for Communication Review case   Provider Name Dr. Binta Handley   Provider Notification Physician   Method of Communication Face to face   Response See orders   Notification Time 1130   Informed doctor that pts hgb is 6.8, no s/s or c/o active bleeding, pt has a hx of anemia, and that hep gtt has been off since last night due to hgb. Order received to keep pt NPO after midnight for EGD tomorrow morning and to order 40mg lovenox daily. Orders initiated.

## 2019-06-08 NOTE — PLAN OF CARE
Problem: Falls - Risk of:  Goal: Will remain free from falls  Description  Will remain free from falls  Note:   Pt alert and oriented. Pt was instructed on how to use the call light. Pt used call light appropriately. Non-slip socks are in place. Bed is in lowest and locked position. No falls noted this shift. Problem: Pain:  Goal: Pain level will decrease  Description  Pain level will decrease  Outcome: Ongoing  Note:   Pt complained of pain and received medication. Patient expressed satisfaction.

## 2019-06-08 NOTE — PROGRESS NOTES
Physical Therapy  [address] [facility]    Physical Therapy Progress Note    Date: 19  Patient Name: Zakia Felipe       Room: 4125/3826-01  MRN: [id number]   Account: [de-identified]   : 1940  (79 y.o.)   Gender: female     Discharge Recommendations   Home with assist PRN  Equipment Needed: No    Referring Practitioner: Dr Michelle Rivero   Diagnosis: Pneumonia   Restrictions/Precautions: Fall Risk, Isolation   Past Medical History:  has a past medical history of Anemia, Anxiety, Arthritis, Bell's palsy, Bright red blood per rectum, Cataracts, bilateral, DDD (degenerative disc disease), Gastric ulcer, Hepatitis, Hyperlipidemia, Hypertension, Inguinal hernia, MRSA (methicillin resistant staph aureus) culture positive, and Osteoarthritis. Past Surgical History:   has a past surgical history that includes partial hysterectomy (cervix not removed) (); bladder suspension (); Cataract removal (Left, ); Eye surgery; Upper gastrointestinal endoscopy (2014); Inguinal hernia repair (); Colonoscopy (9/10/14); Upper gastrointestinal endoscopy (9/10/14); and eye surgery (Left). Additional Pertinent Hx: Pt presents to ER with worsening fatigue, nausea, HA. CXR: R infiltrate, also found to have new onset A fib       Restrictions/Precautions  Restrictions/Precautions: Fall Risk;Isolation  Required Braces or Orthoses?: No    Subjective: Pt is sitting upright in bed, states she has been ambulating to bathroom IND. RN ok's session  Comments: pt demo sudden movements. Patient Observation  Observations: twitches on left side eye and face       Bed Mobility:   Bed Mobility  Rolling: Stand by assistance  Supine to Sit: Stand by assistance  Sit to Supine: Stand by assistance    Transfers:  Sit to Stand: Independent  Stand to sit:  Independent  Bed to Chair: Independent              Ambulation 1  Surface: level tile  Device: No Device  Assistance: Stand by assistance  Quality of Gait: slight unsteadiness, no LOB  Distance: 10ft  Comments: c/o fatigue, no other c/o                                                                                Activity Tolerance: Patient limited by fatigue;Patient limited by endurance          Assessment  Activity Tolerance: Patient limited by fatigue;Patient limited by endurance   Body structures, Functions, Activity limitations: Decreased functional mobility ; Decreased endurance  Prognosis: Good  Discharge Recommendations: Home with assist PRN     Type of devices: Left in chair;Nurse notified;Call light within reach     Plan  Times per week: 5-6x/wk  Times per day: Daily  Current Treatment Recommendations: Transfer Training, Endurance Training, Patient/Caregiver Education & Training, Balance Training, Gait Training, Stair training    Patient Education  New Education Provided: PT POC  Learner:patient  Method: demonstration       Outcome: demonstrated understanding for safe mobility. Goals  Short term goals  Time Frame for Short term goals: 3 days  Short term goal 1:  Increase endurance to good to maximize mobility  Short term goal 2: Improve gait to  ft  Short term goal 3: Improve ability to ambulate up/down 14 steps to access home    PT Individual Minutes  Time In: (P) 0930  Time Out: (P) 0920  Minutes: (P) 14    Electronically signed by Dary Donaldson PTA on 6/8/19 at 2:36 PM

## 2019-06-08 NOTE — PLAN OF CARE
Safety maintained, call light is within reach, no s/s or c/o distress, bed is low/locked, side rails are up x2  Problem: Falls - Risk of:  Goal: Will remain free from falls  Description  Will remain free from falls  6/8/2019 1724 by Hayder Monroe RN  Outcome: Ongoing  6/8/2019 0359 by Eda Zabala RN  Note:   Pt alert and oriented. Pt was instructed on how to use the call light. Pt used call light appropriately. Non-slip socks are in place. Bed is in lowest and locked position. No falls noted this shift. Goal: Absence of physical injury  Description  Absence of physical injury  Outcome: Ongoing     Problem: Pain:  Goal: Pain level will decrease  Description  Pain level will decrease  6/8/2019 1724 by Hayder Monroe RN  Outcome: Ongoing  6/8/2019 0359 by Eda Zabala RN  Outcome: Ongoing  Note:   Pt complained of pain and received medication. Patient expressed satisfaction.     Goal: Control of acute pain  Description  Control of acute pain  Outcome: Ongoing  Goal: Control of chronic pain  Description  Control of chronic pain  Outcome: Ongoing     Problem: Cardiac Output - Decreased:  Goal: Hemodynamic stability will improve  Description  Hemodynamic stability will improve  Outcome: Ongoing     Problem: Musculor/Skeletal Functional Status  Goal: Highest potential functional level  Outcome: Ongoing

## 2019-06-08 NOTE — CONSULTS
 Anxiety     Arthritis     Bell's palsy     Bright red blood per rectum     Cataracts, bilateral     DDD (degenerative disc disease)     Gastric ulcer 2014    per egd    Hepatitis 1965    Hyperlipidemia     Hypertension     Inguinal hernia     MRSA (methicillin resistant staph aureus) culture positive 02/22/2017    urine    Osteoarthritis         Past Surgical History:     Past Surgical History:   Procedure Laterality Date    BLADDER SUSPENSION  2000    CATARACT REMOVAL Left 2009    left eye    COLONOSCOPY  9/10/14    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 DIVERTICULOSIS    EYE SURGERY      left eye lift    EYE SURGERY Left     Cataracts    INGUINAL HERNIA REPAIR  2001    PARTIAL HYSTERECTOMY  1986    UPPER GASTROINTESTINAL ENDOSCOPY  7/2014    gastric ulcer with visible vessel    UPPER GASTROINTESTINAL ENDOSCOPY  9/10/14    HEALED ULCER        Medications Prior to Admission:       Prior to Admission medications    Medication Sig Start Date End Date Taking? Authorizing Provider   polyethylene glycol (GLYCOLAX) powder Take 17 g by mouth daily as needed (constipation)   Yes Historical Provider, MD   LORazepam (ATIVAN) 1 MG tablet Take 1 mg by mouth every 8 hours as needed for Anxiety. Yes Historical Provider, MD   traMADol (ULTRAM) 50 MG tablet Take 50 mg by mouth 2 times daily as needed for Pain.    Yes Historical Provider, MD   omeprazole (PRILOSEC) 20 MG delayed release capsule TAKE 1 CAPSULE BY MOUTH EVERY DAY 5/28/19  Yes Radha Alex MD   lisinopril (PRINIVIL;ZESTRIL) 10 MG tablet TAKE 1 TABLET BY MOUTH DAILY 2/27/19  Yes Radha Alex MD   aspirin 81 MG EC tablet Take 81 mg by mouth daily   Yes Historical Provider, MD   furosemide (LASIX) 40 MG tablet Take 40 mg by mouth daily    Yes Historical Provider, MD   polyvinyl alcohol (LIQUIFILM TEARS) 1.4 % ophthalmic solution Place 2 drops into both eyes as needed   Yes Historical Provider, MD   acetaminophen (TYLENOL) 325 MG tablet Take 650 mg by mouth every 6 hours as needed for Pain. Yes Historical Provider, MD        Allergies:       Bee pollen and Iron    Social History:     Tobacco:    reports that she has quit smoking. She has a 25.00 pack-year smoking history. She has never used smokeless tobacco.  Alcohol:      reports that she does not drink alcohol. Drug Use: reports that she does not use drugs. Family History:     Family History   Problem Relation Age of Onset    Arthritis Mother     Heart Disease Mother     High Blood Pressure Mother     Cancer Brother 61        bone    Cancer Paternal Grandmother         throat age [de-identified]       Review of Systems:     Review of Systems   Constitutional: Positive for fatigue and unexpected weight change. Negative for appetite change and fever. HENT: Negative for mouth sores, sore throat, trouble swallowing and voice change. Eyes: Negative. Respiratory: Positive for cough and shortness of breath. Negative for choking and wheezing. Cardiovascular: Negative for chest pain, palpitations and leg swelling. Gastrointestinal: Negative for abdominal distention, abdominal pain, blood in stool, constipation, diarrhea, nausea and vomiting. Endocrine: Negative for polyphagia and polyuria. Genitourinary: Negative for difficulty urinating, frequency, hematuria, pelvic pain, urgency and vaginal bleeding. Musculoskeletal: Negative for arthralgias, back pain, gait problem and joint swelling. Skin: Negative for color change, pallor and rash. Allergic/Immunologic: Negative for food allergies. Neurological: Negative for dizziness, seizures, weakness, light-headedness and headaches. Hematological: Negative for adenopathy. Does not bruise/bleed easily. Psychiatric/Behavioral: Negative for sleep disturbance. The patient is not nervous/anxious.         Code Status:  Full Code    Physical Exam:     Vitals:  BP (!) 102/47   Pulse 101   Temp 98.4 °F (36.9 °C) (Oral)   Resp 17   Ht 5' 7\" (1.702 m) Wt 167 lb 5.3 oz (75.9 kg)   SpO2 92%   BMI 26.21 kg/m²   Temp (24hrs), Av.9 °F (37.2 °C), Min:98.4 °F (36.9 °C), Max:99.3 °F (37.4 °C)      Physical Exam   Constitutional: She is oriented to person, place, and time. She appears well-developed and well-nourished. HENT:   Head: Normocephalic and atraumatic. No oral lesions   Eyes: Pupils are equal, round, and reactive to light. Conjunctivae are normal. No scleral icterus. Neck: Normal range of motion. Neck supple. No hepatojugular reflux and no JVD present. No tracheal deviation present. No thyromegaly present. Cardiovascular: Normal rate, regular rhythm, normal heart sounds and intact distal pulses. Pulmonary/Chest: Effort normal and breath sounds normal. No respiratory distress. She has no wheezes. She has no rales. Abdominal: Soft. Bowel sounds are normal. She exhibits no distension, no ascites and no mass. There is no hepatomegaly. There is no tenderness. There is no rebound. No hernia. Musculoskeletal: She exhibits no edema or tenderness. No joint swelling   Lymphadenopathy:     She has no cervical adenopathy. Neurological: She is alert and oriented to person, place, and time. No cranial nerve deficit. Skin: Skin is warm. No bruising, no ecchymosis and no rash noted. No erythema. Psychiatric: Thought content normal.   Nursing note and vitals reviewed.     Data:   CBC:   Lab Results   Component Value Date    WBC 4.2 2019    RBC 3.63 2019    HGB 6.8 2019    HCT 23.4 2019    MCV 64.5 2019    MCH 18.9 2019    MCHC 29.3 2019    RDW 19.7 2019     2019    MPV 7.3 2019     CBC with Differential:  Lab Results   Component Value Date    WBC 4.2 2019    RBC 3.63 2019    HGB 6.8 2019    HCT 23.4 2019     2019    MCV 64.5 2019    MCH 18.9 2019    MCHC 29.3 2019    RDW 19.7 2019    LYMPHOPCT 12 2019    MONOPCT 10 06/06/2019    BASOPCT 1 06/06/2019    MONOSABS 0.79 06/06/2019    LYMPHSABS 0.95 06/06/2019    EOSABS 0.00 06/06/2019    BASOSABS 0.08 06/06/2019    DIFFTYPE NOT REPORTED 06/06/2019     Hemoglobin/Hematocrit:  Lab Results   Component Value Date    HGB 6.8 06/08/2019    HCT 23.4 06/08/2019     CMP:    Lab Results   Component Value Date     06/08/2019    K 4.0 06/08/2019    CL 98 06/08/2019    CO2 21 06/08/2019    BUN 8 06/08/2019    CREATININE 0.62 06/08/2019    GFRAA >60 06/08/2019    LABGLOM >60 06/08/2019    GLUCOSE 105 06/08/2019    PROT 7.6 06/06/2019    LABALBU 3.6 06/06/2019    CALCIUM 8.3 06/08/2019    BILITOT 0.46 06/06/2019    ALKPHOS 63 06/06/2019    AST 19 06/06/2019    ALT 11 06/06/2019     BMP:  Lab Results   Component Value Date     06/08/2019    K 4.0 06/08/2019    CL 98 06/08/2019    CO2 21 06/08/2019    BUN 8 06/08/2019    LABALBU 3.6 06/06/2019    CREATININE 0.62 06/08/2019    CALCIUM 8.3 06/08/2019    GFRAA >60 06/08/2019    LABGLOM >60 06/08/2019    GLUCOSE 105 06/08/2019     PT/INR:    Lab Results   Component Value Date    PROTIME 11.0 02/06/2017    INR 1.0 02/06/2017     PTT:    Lab Results   Component Value Date    APTT 35.2 06/06/2019   [APTT}    Assesment:     Primary Problem  Pneumonia due to organism    Active Hospital Problems    Diagnosis Date Noted    Atrial fibrillation with rapid ventricular response ([facility] [address] [I48.91] 06/07/2019    Pneumonia due to organism [J18.9] 06/06/2019       Plan:     1. This patient has dropping hemoglobin. 2. Patient has new onset of atrial fibrillation needing anticoagulation therapy. 3. Advised stool for occult blood which is still pending. 4. Patient may need EGD to evaluate upper GI issues. Basing on the results we'll decide whether she needs colonoscopy etc.  5. Past history of angiodysplasia. 6. Patient will be started on Lovenox. 7. EGD will be arranged as early as possible.   However this may not be possible today as she ate normal breakfast.        Thank you for allowing me to participate in the care of your patient. Please feel free to contact me with anyquestions or concerns. Electronically signed by Tyler Grimaldo MD on 6/8/2019 at 12:41 PM     Copy sent to Dr. Lamont Ahn MD    Note is dictated utilizing voice recognition software. Unfortunately this leads to occasional typographical errors. Please contact our office if you have any questions.

## 2019-06-08 NOTE — FLOWSHEET NOTE
06/08/19 0558   Provider Notification   Reason for Communication Evaluate   Provider Name Dr. Chris Arzate   Provider Notification Physician   Method of Communication Call   Response See orders   Notification Time [phone] [id number]     Dr. Chris Arzate notified of new consult and patient's hgb trends. Also notified of patient's critical hgb of 6.8 this morning. Dr. Chris Arzate ordered occult stool. See new orders.

## 2019-06-08 NOTE — CONSULTS
[facility]   Consult Note                 Date:   6/8/2019  Date of admission:  6/6/2019  2:06 PM  MRN:   [id number]  YOB: 1940    Reason for Consult:  AFIB     HISTORY OF PRESENT ILLNESS:    The patient is a 70 y.o.  female who is admitted to the hospital for progressive weakness , fatigue  Lethargic , lack of apitite  . Found to be in AFIB , pt denies any palpitation , Chest pain , or known AFIB . Pt was started on Heparin this morning was stopped due to low H/H . Pt do have H/O GI bleed and ulcer . Denies any angina , MI , CHF .had echo yesterday as below. Past Medical History:   has a past medical history of Anemia, Anxiety, Arthritis, Bell's palsy, Bright red blood per rectum, Cataracts, bilateral, DDD (degenerative disc disease), Gastric ulcer, Hepatitis, Hyperlipidemia, Hypertension, Inguinal hernia, MRSA (methicillin resistant staph aureus) culture positive, and Osteoarthritis. Past Surgical History:   has a past surgical history that includes partial hysterectomy (cervix not removed) (1986); bladder suspension (2000); Cataract removal (Left, 2009); Eye surgery; Upper gastrointestinal endoscopy (7/2014); Inguinal hernia repair (2001); Colonoscopy (9/10/14); Upper gastrointestinal endoscopy (9/10/14); and eye surgery (Left). Home Medications:    Prior to Admission medications    Medication Sig Start Date End Date Taking? Authorizing Provider   polyethylene glycol (GLYCOLAX) powder Take 17 g by mouth daily as needed (constipation)   Yes Historical Provider, MD   LORazepam (ATIVAN) 1 MG tablet Take 1 mg by mouth every 8 hours as needed for Anxiety. Yes Historical Provider, MD   traMADol (ULTRAM) 50 MG tablet Take 50 mg by mouth 2 times daily as needed for Pain.    Yes Historical Provider, MD   omeprazole (PRILOSEC) 20 MG delayed release capsule TAKE 1 CAPSULE BY MOUTH EVERY DAY 5/28/19  Yes Radha Alex MD   lisinopril (PRINIVIL;ZESTRIL) 10 MG tablet TAKE 1 TABLET BY MOUTH DAILY 2/27/19  Yes Emily Corey MD   aspirin 81 MG EC tablet Take 81 mg by mouth daily   Yes Historical Provider, MD   furosemide (LASIX) 40 MG tablet Take 40 mg by mouth daily    Yes Historical Provider, MD   polyvinyl alcohol (LIQUIFILM TEARS) 1.4 % ophthalmic solution Place 2 drops into both eyes as needed   Yes Historical Provider, MD   acetaminophen (TYLENOL) 325 MG tablet Take 650 mg by mouth every 6 hours as needed for Pain. Yes Historical Provider, MD       Current Medications: Scheduled Meds:   digoxin  250 mcg Intravenous Once    metoprolol tartrate  25 mg Oral BID    guaiFENesin  600 mg Oral BID    azithromycin  500 mg Intravenous Q24H    cefTRIAXone (ROCEPHIN) IV  1 g Intravenous Q24H    furosemide  20 mg Intravenous Daily    sodium chloride flush  10 mL Intravenous 2 times per day    aspirin  81 mg Oral Daily    lisinopril  10 mg Oral Daily    pantoprazole  40 mg Oral QAM AC    ipratropium-albuterol  1 ampule Inhalation Q4H WA     Continuous Infusions:  PRN Meds:.sodium chloride flush, acetaminophen, LORazepam, polyvinyl alcohol, traMADol, polyethylene glycol, benzocaine-menthol, magnesium hydroxide, ondansetron, albuterol     Allergies:  Bee pollen and Iron    Social History:   reports that she has quit smoking. She has a 25.00 pack-year smoking history. She has never used smokeless tobacco. She reports that she does not drink alcohol or use drugs. Family History: family history includes Arthritis in her mother; Cancer in her paternal grandmother; Cancer (age of onset: 61) in her brother; Heart Disease in her mother; High Blood Pressure in her mother.      Review of Systems   CONSTITUTIONAL:  positive for  chills, fatigue and malaise    EYES:  negative for discharge    HEENT:  negative for epistaxis and sore throat    RESPIRATORY:  negative for cough, shortness of breath, wheezing    CARDIOVASCULAR:  negative for chest pain, palpitations, syncope, edema

## 2019-06-09 LAB
ANION GAP SERPL CALCULATED.3IONS-SCNC: 11 MMOL/L (ref 9–17)
BUN BLDV-MCNC: 8 MG/DL (ref 8–23)
BUN/CREAT BLD: ABNORMAL (ref 9–20)
CALCIUM SERPL-MCNC: 8.6 MG/DL (ref 8.6–10.4)
CHLORIDE BLD-SCNC: 103 MMOL/L (ref 98–107)
CO2: 24 MMOL/L (ref 20–31)
CREAT SERPL-MCNC: 0.59 MG/DL (ref 0.5–0.9)
DATE, STOOL #1: NORMAL
DATE, STOOL #2: NORMAL
DATE, STOOL #3: NORMAL
GFR AFRICAN AMERICAN: >60 ML/MIN
GFR NON-AFRICAN AMERICAN: >60 ML/MIN
GFR SERPL CREATININE-BSD FRML MDRD: ABNORMAL ML/MIN/{1.73_M2}
GFR SERPL CREATININE-BSD FRML MDRD: ABNORMAL ML/MIN/{1.73_M2}
GLUCOSE BLD-MCNC: 105 MG/DL (ref 70–99)
HCT VFR BLD CALC: 23.6 % (ref 36–46)
HCT VFR BLD CALC: 24 % (ref 36–46)
HEMOCCULT SP1 STL QL: NEGATIVE
HEMOCCULT SP2 STL QL: NORMAL
HEMOCCULT SP3 STL QL: NORMAL
HEMOGLOBIN: 7 G/DL (ref 12–16)
HEMOGLOBIN: 7.3 G/DL (ref 12–16)
MCH RBC QN AUTO: 19.3 PG (ref 26–34)
MCH RBC QN AUTO: 19.3 PG (ref 26–34)
MCHC RBC AUTO-ENTMCNC: 29.9 G/DL (ref 31–37)
MCHC RBC AUTO-ENTMCNC: 30.6 G/DL (ref 31–37)
MCV RBC AUTO: 63.1 FL (ref 80–100)
MCV RBC AUTO: 64.4 FL (ref 80–100)
MYOGLOBIN: 39 NG/ML (ref 25–58)
NRBC AUTOMATED: ABNORMAL PER 100 WBC
NRBC AUTOMATED: ABNORMAL PER 100 WBC
PATHOLOGIST REVIEW: NORMAL
PATHOLOGIST REVIEW: NORMAL
PDW BLD-RTO: 20 % (ref 11.5–14.9)
PDW BLD-RTO: 20.1 % (ref 11.5–14.9)
PLATELET # BLD: 270 K/UL (ref 150–450)
PLATELET # BLD: 340 K/UL (ref 150–450)
PMV BLD AUTO: 7.2 FL (ref 6–12)
PMV BLD AUTO: 8.7 FL (ref 6–12)
POTASSIUM SERPL-SCNC: 4.7 MMOL/L (ref 3.7–5.3)
RBC # BLD: 3.66 M/UL (ref 4–5.2)
RBC # BLD: 3.8 M/UL (ref 4–5.2)
SODIUM BLD-SCNC: 138 MMOL/L (ref 135–144)
TIME, STOOL #1: NORMAL
TIME, STOOL #2: NORMAL
TIME, STOOL #3: NORMAL
TROPONIN INTERP: NORMAL
TROPONIN T: NORMAL NG/ML
TROPONIN, HIGH SENSITIVITY: 11 NG/L (ref 0–14)
WBC # BLD: 4.6 K/UL (ref 3.5–11)
WBC # BLD: 5.6 K/UL (ref 3.5–11)

## 2019-06-09 PROCEDURE — 94761 N-INVAS EAR/PLS OXIMETRY MLT: CPT

## 2019-06-09 PROCEDURE — 2580000003 HC RX 258: Performed by: INTERNAL MEDICINE

## 2019-06-09 PROCEDURE — 6370000000 HC RX 637 (ALT 250 FOR IP): Performed by: NURSE PRACTITIONER

## 2019-06-09 PROCEDURE — 6370000000 HC RX 637 (ALT 250 FOR IP): Performed by: INTERNAL MEDICINE

## 2019-06-09 PROCEDURE — 36415 COLL VENOUS BLD VENIPUNCTURE: CPT

## 2019-06-09 PROCEDURE — 6360000002 HC RX W HCPCS: Performed by: INTERNAL MEDICINE

## 2019-06-09 PROCEDURE — 94640 AIRWAY INHALATION TREATMENT: CPT

## 2019-06-09 PROCEDURE — G0328 FECAL BLOOD SCRN IMMUNOASSAY: HCPCS

## 2019-06-09 PROCEDURE — 99232 SBSQ HOSP IP/OBS MODERATE 35: CPT | Performed by: INTERNAL MEDICINE

## 2019-06-09 PROCEDURE — 85027 COMPLETE CBC AUTOMATED: CPT

## 2019-06-09 PROCEDURE — 2060000000 HC ICU INTERMEDIATE R&B

## 2019-06-09 PROCEDURE — 80048 BASIC METABOLIC PNL TOTAL CA: CPT

## 2019-06-09 PROCEDURE — 85049 AUTOMATED PLATELET COUNT: CPT

## 2019-06-09 RX ORDER — POLYETHYLENE GLYCOL 3350 17 G/17G
250 POWDER, FOR SOLUTION ORAL ONCE
Status: COMPLETED | OUTPATIENT
Start: 2019-06-09 | End: 2019-06-09

## 2019-06-09 RX ADMIN — METOPROLOL TARTRATE 25 MG: 25 TABLET, FILM COATED ORAL at 21:31

## 2019-06-09 RX ADMIN — IPRATROPIUM BROMIDE AND ALBUTEROL SULFATE 1 AMPULE: .5; 3 SOLUTION RESPIRATORY (INHALATION) at 07:30

## 2019-06-09 RX ADMIN — TRAMADOL HYDROCHLORIDE 50 MG: 50 TABLET, FILM COATED ORAL at 10:21

## 2019-06-09 RX ADMIN — LISINOPRIL 10 MG: 10 TABLET ORAL at 10:01

## 2019-06-09 RX ADMIN — IPRATROPIUM BROMIDE AND ALBUTEROL SULFATE 1 AMPULE: .5; 3 SOLUTION RESPIRATORY (INHALATION) at 19:27

## 2019-06-09 RX ADMIN — ASPIRIN 81 MG: 81 TABLET, COATED ORAL at 10:01

## 2019-06-09 RX ADMIN — TRAMADOL HYDROCHLORIDE 50 MG: 50 TABLET, FILM COATED ORAL at 21:38

## 2019-06-09 RX ADMIN — PANTOPRAZOLE SODIUM 40 MG: 40 TABLET, DELAYED RELEASE ORAL at 07:30

## 2019-06-09 RX ADMIN — GUAIFENESIN 600 MG: 600 TABLET, EXTENDED RELEASE ORAL at 21:30

## 2019-06-09 RX ADMIN — Medication 10 ML: at 10:04

## 2019-06-09 RX ADMIN — FUROSEMIDE 20 MG: 10 INJECTION, SOLUTION INTRAMUSCULAR; INTRAVENOUS at 10:00

## 2019-06-09 RX ADMIN — LORAZEPAM 1 MG: 1 TABLET ORAL at 21:38

## 2019-06-09 RX ADMIN — IPRATROPIUM BROMIDE AND ALBUTEROL SULFATE 1 AMPULE: .5; 3 SOLUTION RESPIRATORY (INHALATION) at 11:01

## 2019-06-09 RX ADMIN — POLYETHYLENE GLYCOL 3350 250 G: 17 POWDER, FOR SOLUTION ORAL at 14:43

## 2019-06-09 RX ADMIN — AZITHROMYCIN MONOHYDRATE 500 MG: 500 INJECTION, POWDER, LYOPHILIZED, FOR SOLUTION INTRAVENOUS at 15:59

## 2019-06-09 RX ADMIN — Medication 10 ML: at 10:03

## 2019-06-09 RX ADMIN — Medication 10 ML: at 21:31

## 2019-06-09 RX ADMIN — LORAZEPAM 1 MG: 1 TABLET ORAL at 10:22

## 2019-06-09 RX ADMIN — ENOXAPARIN SODIUM 40 MG: 40 INJECTION SUBCUTANEOUS at 10:00

## 2019-06-09 RX ADMIN — METOPROLOL TARTRATE 25 MG: 25 TABLET, FILM COATED ORAL at 10:01

## 2019-06-09 RX ADMIN — CEFTRIAXONE SODIUM 1 G: 1 INJECTION, POWDER, FOR SOLUTION INTRAMUSCULAR; INTRAVENOUS at 14:50

## 2019-06-09 RX ADMIN — Medication 10 ML: at 14:50

## 2019-06-09 RX ADMIN — IPRATROPIUM BROMIDE AND ALBUTEROL SULFATE 1 AMPULE: .5; 3 SOLUTION RESPIRATORY (INHALATION) at 15:01

## 2019-06-09 RX ADMIN — GUAIFENESIN 600 MG: 600 TABLET, EXTENDED RELEASE ORAL at 10:01

## 2019-06-09 ASSESSMENT — PAIN DESCRIPTION - LOCATION
LOCATION: GENERALIZED
LOCATION: BACK
LOCATION: GENERALIZED

## 2019-06-09 ASSESSMENT — PAIN DESCRIPTION - DESCRIPTORS: DESCRIPTORS: DISCOMFORT

## 2019-06-09 ASSESSMENT — PAIN SCALES - GENERAL
PAINLEVEL_OUTOF10: 6
PAINLEVEL_OUTOF10: 0
PAINLEVEL_OUTOF10: 0
PAINLEVEL_OUTOF10: 4
PAINLEVEL_OUTOF10: 3

## 2019-06-09 ASSESSMENT — PAIN DESCRIPTION - PAIN TYPE
TYPE: CHRONIC PAIN

## 2019-06-09 ASSESSMENT — PAIN DESCRIPTION - PROGRESSION: CLINICAL_PROGRESSION: GRADUALLY WORSENING

## 2019-06-09 ASSESSMENT — PAIN DESCRIPTION - FREQUENCY: FREQUENCY: CONTINUOUS

## 2019-06-09 ASSESSMENT — PAIN DESCRIPTION - ONSET: ONSET: ON-GOING

## 2019-06-09 NOTE — PROGRESS NOTES
[facility]    Date:   6/8/2019  Patient name:  Britany Oliva  Date of admission:  6/6/2019  2:06 PM  MRN:   [id number]  YOB: 1940    CC- sob     HPI-  Pt being rx for pnemonia sob better able to walk with out sympts   Hb today is 6.8     REVIEW OF SYSTEMS:    · General----negative for fatigue, weight loss  · GI negative for nausea and vomiting, no dysphagia       EXAM-  BP (!) 105/57   Pulse 92   Temp 98.2 °F (36.8 °C) (Oral)   Resp 18   Ht 5' 7\" (1.702 m)   Wt 167 lb 5.3 oz (75.9 kg)   SpO2 99%   BMI 26.21 kg/m²      · General appearance: NAD conversant  · Lungs: normal effort, clear to auscultation bilaterally,no wheeze.   · Heart: regular rate and rhythm, S1, S2 normal, no murmur  · Abdomen: soft, non-tender; no masses, no organomegaly  · Extremities: no cyanosis, no edema no clubbing no synovitis      Laboratory Testing:  CBC:   Recent Labs     06/08/19  0529   WBC 4.2   HGB 6.8*        BMP:    Recent Labs     06/06/19  1425 06/07/19  0615 06/08/19  0529   * 132* 131*   K 4.1 3.6* 4.0   CL 99 99 98   CO2 20 21 21   BUN 9 11 8   CREATININE 0.70 0.67 0.62   GLUCOSE 117* 111* 105*         ASSESSMENT:    Patient Active Problem List   Diagnosis    Hypertension    Anemia    Cataracts, bilateral    Hepatitis    Diverticulosis    Bell's palsy    Hepatomegaly    Duodenal ulcer    Gastric ulcer    Osteoarthritis    Hyperlipidemia    GI bleed    Lower GI bleed    Ileus (HCC)    Anxiety    Colitis    Food poisoning    Diarrhea of presumed infectious origin    Iron deficiency anemia due to chronic blood loss    Rectal bleeding    Chronic pain syndrome    Pneumonia due to organism    Atrial fibrillation with rapid ventricular response (HCC)       PLAN:  Pt with pneumonia   Noted to be in A fib - stop heparin echo noted  Use ASA   Hb is 6.8  with severe microcytic anemia need to r/o GI causes plan GI work up   D/w Dr Laddie Goldmann, MD JOHN J. [address], [address] [place] [address].    Phone [phone]   Fax: [phone]  Answering Service: [phone]

## 2019-06-09 NOTE — CARE COORDINATION
ONGOING DISCHARGE PLAN:    Spoke with patient regarding discharge plan and patient confirms that plan is still is to return home and is now refusing a VNS for discharge. Admitted for Pneumonia and A--fib    EGD tomorrow. .  Patient is refusing to have an colonoscopy. NPO this morning. HGB today is 7.0, hct 23.6,    Per cardio - NOT CANDIDATE FOR AC     Remains on IV Zithromax, IV rocephin, IV Lasix    Will continue to follow for additional discharge needs.     Electronically signed by Randolph Patterson RN on 6/9/2019 at 9:32 AM

## 2019-06-09 NOTE — PROGRESS NOTES
Physical Therapy  DATE: 2019    NAME: Rickey Monizabela  MRN: [id number]   : 1940    Patient not seen this date for Physical Therapy due to:  [] Blood transfusion in progress  [] Hemodialysis  [x]  Patient Declined  [] Spine Precautions   [] Strict Bedrest  [] Surgery/ Procedure  [] Testing      [] Other        [] PT being discontinued at this time. Patient independent. No further needs. [] PT being discontinued at this time as the patient has been transferred to palliative care. No further needs.     Dany Perez, PTA

## 2019-06-09 NOTE — PROGRESS NOTES
Gastroenterology Progress Note    Yohan Heredia is a 70 y.o. female patient. Hospitalization Day:3    I am seeing the patient today for GI bleeding, anemia. SUBJECTIVE:    Patient stool is negative for occult blood. She is known to have angiodysplasia of the cecum which was managed about 4 years ago. She denies abdominal pain, bloating, nausea vomiting. Hemoglobin has been stable. Physical    VITALS:  BP (!) 116/54   Pulse 96   Temp 98.4 °F (36.9 °C) (Oral)   Resp 17   Ht 5' 7\" (1.702 m)   Wt 167 lb 5.3 oz (75.9 kg)   SpO2 97%   BMI 26.21 kg/m²   TEMPERATURE:  Current - Temp: 98.4 °F (36.9 °C); Max - Temp  Av.2 °F (36.8 °C)  Min: 97.9 °F (36.6 °C)  Max: 98.4 °F (36.9 °C)    General:  Alert and oriented,  No apparent distress  Skin- without jaundice  Eyes: anicteric sclera  Cardiac: RRR, Nl s1s2, without murmurs  Lungs CTA Bilaterally, normal effort  Abdomen soft, ND, NT, no HSM, Bowel sounds normal  Ext: without edema  Neuro: no asterixis     Data    Data Review:    Recent Labs     1915 19  0529 19  0557   WBC 6.3 4.2 4.6   HGB 7.1* 6.8* 7.0*   HCT 24.1* 23.4* 23.6*   MCV 64.7* 64.5* 64.4*    259 270     Recent Labs     1915 19  0529 19  0557   * 131* 138   K 3.6* 4.0 4.7   CL 99 98 103   CO2 21 21 24   BUN 11 8 8   CREATININE 0.67 0.62 0.59     Recent Labs     19  1425   AST 19   ALT 11   BILITOT 0.46   ALKPHOS 63     No results for input(s): LIPASE, AMYLASE in the last 72 hours. No results for input(s): PROTIME, INR in the last 72 hours. No results for input(s): PTT in the last 72 hours. Radiology Review:      ASSESSMENT:  Principal Problem:    Pneumonia due to organism  Active Problems:    Atrial fibrillation with rapid ventricular response (HCC)  Resolved Problems:    * No resolved hospital problems. *      The conditions that I am treating are Stable and show no change    PLAN :  1. Patient looks stable.   2. Given her

## 2019-06-09 NOTE — PLAN OF CARE
Problem: Falls - Risk of:  Goal: Will remain free from falls  Description  Will remain free from falls  Outcome: Met This Shift  Note:   No falls this shift. Up to bathroom and to the chair per self without difficulty. Used call light appropriately. Problem: Pain:  Goal: Pain level will decrease  Description  Pain level will decrease  Outcome: Met This Shift  Note:   C/o back pain earlier, medicated with tramadol as ordered, with relief.  Pt able to reposition self for comfort

## 2019-06-09 NOTE — PLAN OF CARE
Problem: Falls - Risk of:  Goal: Will remain free from falls  Description  Will remain free from falls  6/9/2019 0139 by Becca Moser RN  Outcome: Ongoing  Note:   Patient alert and oriented. Using call light appropriately. Up with steady gait in room. Instructed to call nurse if help needed. 6/8/2019 1724 by Cristopher Seay RN  Outcome: Ongoing  Goal: Absence of physical injury  Description  Absence of physical injury  6/9/2019 0139 by Becca Moser RN  Outcome: Ongoing  6/8/2019 1724 by Cristopher Seay RN  Outcome: Ongoing     Problem: Pain:  Goal: Pain level will decrease  Description  Pain level will decrease  6/9/2019 0139 by Becca Moser RN  Outcome: Ongoing  Note:   Complains of generalized paiun. Medicated with Toradol with relief. 6/8/2019 1724 by Cristopher Seay RN  Outcome: Ongoing  Goal: Control of acute pain  Description  Control of acute pain  6/9/2019 0139 by Becca Moser RN  Outcome: Ongoing  6/8/2019 1724 by Cristopher Seay RN  Outcome: Ongoing  Goal: Control of chronic pain  Description  Control of chronic pain  6/9/2019 0139 by Becca Moser RN  Outcome: Ongoing  6/8/2019 1724 by Cristopher Seay RN  Outcome: Ongoing     Problem: Cardiac Output - Decreased:  Goal: Hemodynamic stability will improve  Description  Hemodynamic stability will improve  6/9/2019 0139 by Bceca Moser RN  Outcome: Ongoing  Note:   Telemetry atrial fib. Lopressor po started. BP stable.   Rate controlled at rest.  6/8/2019 1724 by Cristopher Seay RN  Outcome: Ongoing     Problem: Musculor/Skeletal Functional Status  Goal: Highest potential functional level  6/9/2019 0139 by Becca Moser RN  Outcome: Ongoing  6/8/2019 1724 by Cristopher Seay RN  Outcome: Ongoing

## 2019-06-09 NOTE — PROGRESS NOTES
Spoke with dr Guru Walker, informed of SOB (-), hgb 7.0. Dr stated pt can eat today, okay to give lovenox today, and plan for egd tomorrow.

## 2019-06-10 ENCOUNTER — ANESTHESIA EVENT (OUTPATIENT)
Dept: OPERATING ROOM | Age: 79
DRG: 194 | End: 2019-06-10
Payer: MEDICARE

## 2019-06-10 ENCOUNTER — ANESTHESIA (OUTPATIENT)
Dept: OPERATING ROOM | Age: 79
DRG: 194 | End: 2019-06-10
Payer: MEDICARE

## 2019-06-10 VITALS
RESPIRATION RATE: 18 BRPM | TEMPERATURE: 96.8 F | DIASTOLIC BLOOD PRESSURE: 67 MMHG | SYSTOLIC BLOOD PRESSURE: 109 MMHG | OXYGEN SATURATION: 100 %

## 2019-06-10 LAB
ANION GAP SERPL CALCULATED.3IONS-SCNC: 14 MMOL/L (ref 9–17)
BUN BLDV-MCNC: 9 MG/DL (ref 8–23)
BUN/CREAT BLD: ABNORMAL (ref 9–20)
CALCIUM SERPL-MCNC: 8.9 MG/DL (ref 8.6–10.4)
CHLORIDE BLD-SCNC: 101 MMOL/L (ref 98–107)
CO2: 23 MMOL/L (ref 20–31)
CREAT SERPL-MCNC: 0.63 MG/DL (ref 0.5–0.9)
GFR AFRICAN AMERICAN: >60 ML/MIN
GFR NON-AFRICAN AMERICAN: >60 ML/MIN
GFR SERPL CREATININE-BSD FRML MDRD: ABNORMAL ML/MIN/{1.73_M2}
GFR SERPL CREATININE-BSD FRML MDRD: ABNORMAL ML/MIN/{1.73_M2}
GLUCOSE BLD-MCNC: 102 MG/DL (ref 70–99)
HCT VFR BLD CALC: 26 % (ref 36–46)
HEMOGLOBIN: 7.7 G/DL (ref 12–16)
MCH RBC QN AUTO: 18.9 PG (ref 26–34)
MCHC RBC AUTO-ENTMCNC: 29.5 G/DL (ref 31–37)
MCV RBC AUTO: 64.1 FL (ref 80–100)
NRBC AUTOMATED: ABNORMAL PER 100 WBC
PATHOLOGIST REVIEW: NORMAL
PATHOLOGIST REVIEW: NORMAL
PDW BLD-RTO: 20 % (ref 11.5–14.9)
PLATELET # BLD: 316 K/UL (ref 150–450)
PLATELET # BLD: 358 K/UL (ref 150–450)
PMV BLD AUTO: 7.8 FL (ref 6–12)
POTASSIUM SERPL-SCNC: 4.6 MMOL/L (ref 3.7–5.3)
RBC # BLD: 4.05 M/UL (ref 4–5.2)
SODIUM BLD-SCNC: 138 MMOL/L (ref 135–144)
WBC # BLD: 5.8 K/UL (ref 3.5–11)

## 2019-06-10 PROCEDURE — 2500000003 HC RX 250 WO HCPCS: Performed by: NURSE ANESTHETIST, CERTIFIED REGISTERED

## 2019-06-10 PROCEDURE — 3609009500 HC COLONOSCOPY DIAGNOSTIC OR SCREENING: Performed by: INTERNAL MEDICINE

## 2019-06-10 PROCEDURE — 6370000000 HC RX 637 (ALT 250 FOR IP): Performed by: INTERNAL MEDICINE

## 2019-06-10 PROCEDURE — 0DJ08ZZ INSPECTION OF UPPER INTESTINAL TRACT, VIA NATURAL OR ARTIFICIAL OPENING ENDOSCOPIC: ICD-10-PCS | Performed by: INTERNAL MEDICINE

## 2019-06-10 PROCEDURE — 6370000000 HC RX 637 (ALT 250 FOR IP): Performed by: NURSE PRACTITIONER

## 2019-06-10 PROCEDURE — 2580000003 HC RX 258: Performed by: INTERNAL MEDICINE

## 2019-06-10 PROCEDURE — 3609017100 HC EGD: Performed by: INTERNAL MEDICINE

## 2019-06-10 PROCEDURE — 6360000002 HC RX W HCPCS: Performed by: NURSE ANESTHETIST, CERTIFIED REGISTERED

## 2019-06-10 PROCEDURE — 0DJD8ZZ INSPECTION OF LOWER INTESTINAL TRACT, VIA NATURAL OR ARTIFICIAL OPENING ENDOSCOPIC: ICD-10-PCS | Performed by: INTERNAL MEDICINE

## 2019-06-10 PROCEDURE — 45378 DIAGNOSTIC COLONOSCOPY: CPT | Performed by: INTERNAL MEDICINE

## 2019-06-10 PROCEDURE — 2060000000 HC ICU INTERMEDIATE R&B

## 2019-06-10 PROCEDURE — 94640 AIRWAY INHALATION TREATMENT: CPT

## 2019-06-10 PROCEDURE — 2580000003 HC RX 258: Performed by: ANESTHESIOLOGY

## 2019-06-10 PROCEDURE — 7100000000 HC PACU RECOVERY - FIRST 15 MIN: Performed by: INTERNAL MEDICINE

## 2019-06-10 PROCEDURE — 3700000001 HC ADD 15 MINUTES (ANESTHESIA): Performed by: INTERNAL MEDICINE

## 2019-06-10 PROCEDURE — 36415 COLL VENOUS BLD VENIPUNCTURE: CPT

## 2019-06-10 PROCEDURE — 43235 EGD DIAGNOSTIC BRUSH WASH: CPT | Performed by: INTERNAL MEDICINE

## 2019-06-10 PROCEDURE — 6360000002 HC RX W HCPCS: Performed by: INTERNAL MEDICINE

## 2019-06-10 PROCEDURE — 85027 COMPLETE CBC AUTOMATED: CPT

## 2019-06-10 PROCEDURE — 3700000000 HC ANESTHESIA ATTENDED CARE: Performed by: INTERNAL MEDICINE

## 2019-06-10 PROCEDURE — 80048 BASIC METABOLIC PNL TOTAL CA: CPT

## 2019-06-10 PROCEDURE — 2709999900 HC NON-CHARGEABLE SUPPLY: Performed by: INTERNAL MEDICINE

## 2019-06-10 PROCEDURE — 94761 N-INVAS EAR/PLS OXIMETRY MLT: CPT

## 2019-06-10 PROCEDURE — 7100000001 HC PACU RECOVERY - ADDTL 15 MIN: Performed by: INTERNAL MEDICINE

## 2019-06-10 RX ORDER — LIDOCAINE HYDROCHLORIDE 20 MG/ML
INJECTION, SOLUTION EPIDURAL; INFILTRATION; INTRACAUDAL; PERINEURAL PRN
Status: DISCONTINUED | OUTPATIENT
Start: 2019-06-10 | End: 2019-06-10 | Stop reason: SDUPTHER

## 2019-06-10 RX ORDER — SODIUM CHLORIDE, SODIUM LACTATE, POTASSIUM CHLORIDE, CALCIUM CHLORIDE 600; 310; 30; 20 MG/100ML; MG/100ML; MG/100ML; MG/100ML
INJECTION, SOLUTION INTRAVENOUS CONTINUOUS
Status: DISCONTINUED | OUTPATIENT
Start: 2019-06-10 | End: 2019-06-10

## 2019-06-10 RX ORDER — PROPOFOL 10 MG/ML
INJECTION, EMULSION INTRAVENOUS PRN
Status: DISCONTINUED | OUTPATIENT
Start: 2019-06-10 | End: 2019-06-10 | Stop reason: SDUPTHER

## 2019-06-10 RX ADMIN — PROPOFOL 30 MG: 10 INJECTION, EMULSION INTRAVENOUS at 11:26

## 2019-06-10 RX ADMIN — GUAIFENESIN 600 MG: 600 TABLET, EXTENDED RELEASE ORAL at 22:00

## 2019-06-10 RX ADMIN — SODIUM CHLORIDE, POTASSIUM CHLORIDE, SODIUM LACTATE AND CALCIUM CHLORIDE: 600; 310; 30; 20 INJECTION, SOLUTION INTRAVENOUS at 10:17

## 2019-06-10 RX ADMIN — PANTOPRAZOLE SODIUM 40 MG: 40 TABLET, DELAYED RELEASE ORAL at 16:53

## 2019-06-10 RX ADMIN — PROPOFOL 20 MG: 10 INJECTION, EMULSION INTRAVENOUS at 11:28

## 2019-06-10 RX ADMIN — PROPOFOL 20 MG: 10 INJECTION, EMULSION INTRAVENOUS at 11:30

## 2019-06-10 RX ADMIN — IPRATROPIUM BROMIDE AND ALBUTEROL SULFATE 1 AMPULE: .5; 3 SOLUTION RESPIRATORY (INHALATION) at 19:03

## 2019-06-10 RX ADMIN — Medication 10 ML: at 16:40

## 2019-06-10 RX ADMIN — PROPOFOL 20 MG: 10 INJECTION, EMULSION INTRAVENOUS at 12:00

## 2019-06-10 RX ADMIN — TRAMADOL HYDROCHLORIDE 50 MG: 50 TABLET, FILM COATED ORAL at 22:00

## 2019-06-10 RX ADMIN — PROPOFOL 20 MG: 10 INJECTION, EMULSION INTRAVENOUS at 11:51

## 2019-06-10 RX ADMIN — METOPROLOL TARTRATE 25 MG: 25 TABLET, FILM COATED ORAL at 22:01

## 2019-06-10 RX ADMIN — PROPOFOL 20 MG: 10 INJECTION, EMULSION INTRAVENOUS at 11:39

## 2019-06-10 RX ADMIN — METOPROLOL TARTRATE 25 MG: 25 TABLET, FILM COATED ORAL at 08:09

## 2019-06-10 RX ADMIN — PROPOFOL 20 MG: 10 INJECTION, EMULSION INTRAVENOUS at 11:43

## 2019-06-10 RX ADMIN — IPRATROPIUM BROMIDE AND ALBUTEROL SULFATE 1 AMPULE: .5; 3 SOLUTION RESPIRATORY (INHALATION) at 15:23

## 2019-06-10 RX ADMIN — GUAIFENESIN 600 MG: 600 TABLET, EXTENDED RELEASE ORAL at 16:39

## 2019-06-10 RX ADMIN — Medication 10 ML: at 08:09

## 2019-06-10 RX ADMIN — IPRATROPIUM BROMIDE AND ALBUTEROL SULFATE 1 AMPULE: .5; 3 SOLUTION RESPIRATORY (INHALATION) at 07:29

## 2019-06-10 RX ADMIN — CEFTRIAXONE SODIUM 1 G: 1 INJECTION, POWDER, FOR SOLUTION INTRAMUSCULAR; INTRAVENOUS at 16:39

## 2019-06-10 RX ADMIN — LORAZEPAM 1 MG: 1 TABLET ORAL at 22:00

## 2019-06-10 RX ADMIN — Medication 10 ML: at 21:00

## 2019-06-10 RX ADMIN — PROPOFOL 20 MG: 10 INJECTION, EMULSION INTRAVENOUS at 11:55

## 2019-06-10 RX ADMIN — PROPOFOL 20 MG: 10 INJECTION, EMULSION INTRAVENOUS at 11:47

## 2019-06-10 RX ADMIN — LISINOPRIL 10 MG: 10 TABLET ORAL at 16:40

## 2019-06-10 RX ADMIN — PROPOFOL 20 MG: 10 INJECTION, EMULSION INTRAVENOUS at 11:37

## 2019-06-10 RX ADMIN — LIDOCAINE HYDROCHLORIDE 60 MG: 20 INJECTION, SOLUTION EPIDURAL; INFILTRATION; INTRACAUDAL; PERINEURAL at 11:26

## 2019-06-10 RX ADMIN — AZITHROMYCIN MONOHYDRATE 500 MG: 500 INJECTION, POWDER, LYOPHILIZED, FOR SOLUTION INTRAVENOUS at 17:22

## 2019-06-10 ASSESSMENT — PULMONARY FUNCTION TESTS
PIF_VALUE: 0
PIF_VALUE: 1
PIF_VALUE: 0

## 2019-06-10 ASSESSMENT — PAIN SCALES - GENERAL
PAINLEVEL_OUTOF10: 0
PAINLEVEL_OUTOF10: 0
PAINLEVEL_OUTOF10: 4
PAINLEVEL_OUTOF10: 0
PAINLEVEL_OUTOF10: 0

## 2019-06-10 NOTE — CARE COORDINATION
ONGOING DISCHARGE PLAN:    Spoke with patient regarding discharge plan and patient confirms that plan is still to discharge to home   Per Cardio  AFIB rate controlled. S/p Digoxin 250 mcg 6/8/2019. Continue Lopressor 25 mg BID  Anticoagulation discontinued, drop in H&H. PMH of angiodysplasia. NPO   Plan for EGD today. Echo as above       Will continue to follow for additional discharge needs.     Electronically signed by Maxine Gonzalez RN on 6/10/2019 at 3:21 PM

## 2019-06-10 NOTE — PROGRESS NOTES
microcytic anemia need to r/o GI causes plan GI work up   D/w Dr Africa Albright MD  [address], [address] [place] [address].    Phone [phone]   Fax: [phone]  Answering Service: [phone]

## 2019-06-10 NOTE — ANESTHESIA PRE PROCEDURE
Department of Anesthesiology  Preprocedure Note       Name:  Mykel Jon   Age:  70 y.o.  :  1940                                          MRN:  [id number]         Date:  6/10/2019      Surgeon: Lobo Soares):  Halie Jain MD    Procedure: EGD (N/A Esophagus)  COLONOSCOPY DIAGNOSTIC (N/A )    Medications prior to admission:   Prior to Admission medications    Medication Sig Start Date End Date Taking? Authorizing Provider   polyethylene glycol (GLYCOLAX) powder Take 17 g by mouth daily as needed (constipation)   Yes Historical Provider, MD   LORazepam (ATIVAN) 1 MG tablet Take 1 mg by mouth every 8 hours as needed for Anxiety. Yes Historical Provider, MD   traMADol (ULTRAM) 50 MG tablet Take 50 mg by mouth 2 times daily as needed for Pain. Yes Historical Provider, MD   omeprazole (PRILOSEC) 20 MG delayed release capsule TAKE 1 CAPSULE BY MOUTH EVERY DAY 19  Yes Sushil Ch MD   lisinopril (PRINIVIL;ZESTRIL) 10 MG tablet TAKE 1 TABLET BY MOUTH DAILY 19  Yes Sushil Ch MD   aspirin 81 MG EC tablet Take 81 mg by mouth daily   Yes Historical Provider, MD   furosemide (LASIX) 40 MG tablet Take 40 mg by mouth daily    Yes Historical Provider, MD   polyvinyl alcohol (LIQUIFILM TEARS) 1.4 % ophthalmic solution Place 2 drops into both eyes as needed   Yes Historical Provider, MD   acetaminophen (TYLENOL) 325 MG tablet Take 650 mg by mouth every 6 hours as needed for Pain.    Yes Historical Provider, MD       Current medications:    Current Facility-Administered Medications   Medication Dose Route Frequency Provider Last Rate Last Dose    lactated ringers infusion   Intravenous Continuous Sher Khan  mL/hr at 06/10/19 1017      [Held by provider] enoxaparin (LOVENOX) injection 40 mg  40 mg Subcutaneous Daily Halie Jain MD        [facility] Hold] metoprolol tartrate (LOPRESSOR) tablet 25 mg  25 mg Oral BID Ana Garcia MD   25 mg at 06/10/19 0809    [MAR Hold] bisacodyl JAMES Montes CNP        [MAR Hold] ondansetron [facility]) injection 4 mg  4 mg Intravenous Q6H PRN JAMES Tao CNP        [MAR Hold] albuterol (PROVENTIL) nebulizer solution 2.5 mg  2.5 mg Nebulization Q2H PRN JAMES Tao CNP        [MAR Hold] ipratropium-albuterol (DUONEB) nebulizer solution 1 ampule  1 ampule Inhalation Q4H WA JAMES Tao CNP   1 ampule at 06/10/19 6585       Allergies: Allergies   Allergen Reactions    Bee Pollen Anaphylaxis    Iron Rash       Problem List:    Patient Active Problem List   Diagnosis Code    Hypertension I10    Anemia D64.9    Cataracts, bilateral H26.9    Hepatitis K75.9    Diverticulosis K57.90    Bell's palsy G51.0    Hepatomegaly R16.0    Duodenal ulcer K26.9    Gastric ulcer K25.9    Osteoarthritis M19.90    Hyperlipidemia E78.5    GI bleed K92.2    Lower GI bleed K92.2    Ileus (HCC) K56.7    Anxiety F41.9    Colitis K52.9    Food poisoning T62. 91XA    Diarrhea of presumed infectious origin R19.7    Iron deficiency anemia due to chronic blood loss D50.0    Rectal bleeding K62.5    Chronic pain syndrome G89.4    Pneumonia due to organism J18.9    Atrial fibrillation with rapid ventricular response (HCC) I48.91       Past Medical History:        Diagnosis Date    Anemia     Anxiety     Arthritis     Bell's palsy     Bright red blood per rectum     Cataracts, bilateral     DDD (degenerative disc disease)     Gastric ulcer 2014    per egd    Hepatitis 1965    Hyperlipidemia     Hypertension     Inguinal hernia     MRSA (methicillin resistant staph aureus) culture positive 02/22/2017    urine    Osteoarthritis        Past Surgical History:        Procedure Laterality Date    BLADDER SUSPENSION  2000    CATARACT REMOVAL Left 2009    left eye    COLONOSCOPY  9/10/14    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   DIVERTICULOSIS    EYE SURGERY      left eye lift    EYE SURGERY Left     Cataracts    INGUINAL HERNIA REPAIR [facility]    UPPER GASTROINTESTINAL ENDOSCOPY  7/2014    gastric ulcer with visible vessel    UPPER GASTROINTESTINAL ENDOSCOPY  9/10/14    HEALED ULCER       Social History:    Social History     Tobacco Use    Smoking status: Former Smoker     Packs/day: 0.50     Years: 50.00     Pack years: 25.00    Smokeless tobacco: Never Used   Substance Use Topics    Alcohol use: No                                Counseling given: Not Answered      Vital Signs (Current):   Vitals:    06/10/19 0725 06/10/19 0729 06/10/19 0809 06/10/19 1011   BP: 111/72  118/74 125/72   Pulse: 81  98 80   Resp: 18 18  18   Temp: 97.3 °F (36.3 °C)   97.5 °F (36.4 °C)   TempSrc: Oral   Oral   SpO2: 97% 97%  96%   Weight:       Height:                                                  BP Readings from Last 3 Encounters:   06/10/19 125/72   01/02/19 126/67   11/28/17 122/78       NPO Status: Time of last liquid consumption: 0803(sip with meds)                        Time of last solid consumption: 1233                        Date of last liquid consumption: 06/10/19                        Date of last solid food consumption: 06/09/19    BMI:   Wt Readings from Last 3 Encounters:   06/06/19 167 lb 5.3 oz (75.9 kg)   01/02/19 162 lb (73.5 kg)   11/28/17 163 lb (73.9 kg)     Body mass index is 26.21 kg/m².     CBC:   Lab Results   Component Value Date    WBC 5.8 06/10/2019    RBC 4.05 06/10/2019    HGB 7.7 06/10/2019    HCT 26.0 06/10/2019    MCV 64.1 06/10/2019    RDW 20.0 06/10/2019     06/10/2019       CMP:   Lab Results   Component Value Date     06/10/2019    K 4.6 06/10/2019     06/10/2019    CO2 23 06/10/2019    BUN 9 06/10/2019    CREATININE 0.63 06/10/2019    GFRAA >60 06/10/2019    LABGLOM >60 06/10/2019    GLUCOSE 102 06/10/2019    PROT 7.6 06/06/2019    CALCIUM 8.9 06/10/2019    BILITOT 0.46 06/06/2019    ALKPHOS 63 06/06/2019    AST 19 06/06/2019    ALT 11 06/06/2019       POC Tests: No

## 2019-06-10 NOTE — PROGRESS NOTES
[address]   OCCUPATIONAL THERAPY MISSED TREATMENT NOTE   INPATIENT   Date: 6/10/19  Patient Name: Rickey Monday       Room: 43 [facility]  MRN: [id number]   Account #: [de-identified]    : 1940  (70 y.o.)  Gender: female   Referring Practitioner: Dr Denis Junior   Diagnosis: Pneumonia              REASON FOR MISSED TREATMENT:  Patient at testing and/or off the floor   -   Surgery        Helayne Age, DELGADO

## 2019-06-10 NOTE — PROGRESS NOTES
Physical Therapy  DATE: 6/10/2019    NAME: Mykel Jon  MRN: [id number]   : 1940    Patient not seen this date for Physical Therapy due to:  [] Blood transfusion in progress  [] Hemodialysis  []  Patient Declined  [] Spine Precautions   [] Strict Bedrest  [x] Surgery/ Procedure  [] Testing      [x] Other  Off unit getting EGD. [] PT being discontinued at this time. Patient independent. No further needs. [] PT being discontinued at this time as the patient has been transferred to palliative care. No further needs.     Dioni Cardona, PTA

## 2019-06-10 NOTE — PLAN OF CARE
Problem: Falls - Risk of:  Goal: Will remain free from falls  Description  Will remain free from falls  6/10/2019 0229 by Maria Ines Pantoja RN  Outcome: Ongoing  Note:   Parient alert and oriented. Up per self in room with steady gait. Instructed to call nurse if help needed. 6/9/2019 1834 by Nash Field RN  Outcome: Met This Shift  Note:   No falls this shift. Up to bathroom and to the chair per self without difficulty. Used call light appropriately. Goal: Absence of physical injury  Description  Absence of physical injury  Outcome: Ongoing     Problem: Pain:  Goal: Pain level will decrease  Description  Pain level will decrease  6/10/2019 0229 by Maria Ines Pantoja RN  Outcome: Ongoing  Note:   Medicated for complaints of generalized pain with tramadol. Tramadol relieved pain. 6/9/2019 1834 by Nash Field RN  Outcome: Met This Shift  Note:   C/o back pain earlier, medicated with tramadol as ordered, with relief. Pt able to reposition self for comfort    Goal: Control of acute pain  Description  Control of acute pain  Outcome: Ongoing  Goal: Control of chronic pain  Description  Control of chronic pain  Outcome: Ongoing     Problem: Cardiac Output - Decreased:  Goal: Hemodynamic stability will improve  Description  Hemodynamic stability will improve  6/10/2019 0229 by Maria Ines Pantoja RN  Outcome: Ongoing  Note:   Telemetry atrial fib. Rate controlled at rest.  VS stable Lopressor po given as ordered. 6/9/2019 1834 by Nash Field RN  Outcome: Ongoing  Note:   HR up to 150's while walking earlier this shift. After po lopressor and ativan given, HR controlled. Remains Afib.       Problem: Musculor/Skeletal Functional Status  Goal: Highest potential functional level  Outcome: Ongoing

## 2019-06-10 NOTE — PROGRESS NOTES
Temp 97.3 °F (36.3 °C) (Oral)   Resp 18   Ht 5' 7\" (1.702 m)   Wt 167 lb 5.3 oz (75.9 kg)   SpO2 97%   BMI 26.21 kg/m²   Constitutional and General Appearance: alert, cooperative, no distress and appears stated age    Respiratory:  · Clear to auscultation bilaterally, with equal air entry  Cardiovascular:  · S1, s2, rrr, no pain on palpation of anterior chest wall. Abdomen:   · No masses or tenderness, soft abdomen  · Bowel sounds present  Extremities:  · +1 bilateral lower extremity edema noted. Integumentum:   Intact with no rashes noted      EK2019: Afib with RVR    ECHO:   Echo:19  Summary  Left ventricle is normal in size and wall thickness. Global left ventricular systolic function is normal Estimated ejection  fraction is 65 % . Moderately dilated RA and LA. Normal right ventricular size and function. Aortic leaflet calcification with no stenosis. Mild aortic insufficiency. Mitral annular calcification is seen. Mild mitral regurgitation. Moderate tricuspid regurgitation. Estimated right ventricular systolic pressure is 44 mmHg. Mild to Moderate pulmonary hypertension. Technically difficult visualization of the pulmonic valve, no abnormality  seen. Normal aortic root dimension. Assessment / Acute Cardiac Problems:     Patient Active Problem List:     Hypertension     Anemia     Cataracts, bilateral     Hepatitis     Diverticulosis     Bell's palsy     Hepatomegaly     Duodenal ulcer     Gastric ulcer     Osteoarthritis     Hyperlipidemia     GI bleed     Lower GI bleed     Ileus ([address]     Anxiety     Colitis     Food poisoning     Diarrhea of presumed infectious origin     Iron deficiency anemia due to chronic blood loss     Rectal bleeding     Chronic pain syndrome     Pneumonia due to organism     Atrial fibrillation with rapid ventricular response ([address]      Plan of Treatment:     AFIB rate controlled. S/p Digoxin 250 mcg 2019.  Continue Lopressor 25 mg BID  Anticoagulation discontinued, drop in H&H. PMH of angiodysplasia. NPO   Plan for EGD today. Echo as above       Will discuss with rounding attending  for final recommendations. Bisi Rider MD  Family Medicine Resident, PGY 2   Attending Physician Statement  I have discussed the care of the patient, including pertinent history and exam findings, with the resident. I have seen and examined the patient and the key elements of all parts of the encounter have been performed by me. I agree with the assessment, plan and orders as documented by the resident.     Ana Garcia MD

## 2019-06-10 NOTE — ANESTHESIA POSTPROCEDURE EVALUATION
POST- ANESTHESIA EVALUATION       Pt Name: Leopoldo Alvine  MRN: [id number]  YOB: 1940  Date of evaluation: 6/10/2019  Time:  1:22 PM      /84   Pulse 77   Temp 97.7 °F (36.5 °C) (Infrared)   Resp 28   Ht 5' 7\" (1.702 m)   Wt 167 lb 5.3 oz (75.9 kg)   SpO2 95%   BMI 26.21 kg/m²      Consciousness Level  Awake  Cardiopulmonary Status  Stable  Pain Adequately Treated YES  Nausea / Vomiting  NO  Adequate Hydration  YES  Anesthesia Related Complications NONE      Electronically signed by Zeenat Schmidt MD on 6/10/2019 at 1:22 PM       Department of Anesthesiology  Postprocedure Note    Patient: Leopoldo Alvine  MRN: [id number]  YOB: 1940  Date of evaluation: 6/10/2019  Time:  1:21 PM     Procedure Summary     Date:  06/10/19 Room / Location:  [address] [place] [address] Tiffany Lama 08 / [address] [place] [address] Tiffany Lama    Anesthesia Start:  1124 Anesthesia Stop:  1218    Procedures:       EGD AND PHOTOS (N/A Esophagus)      COLONOSCOPY DIAGNOSTIC AND PHOTOS (N/A ) Diagnosis:  (GI BLEED)    Surgeon:  Charis Richard MD Responsible Provider:  Zeenat Schmidt MD    Anesthesia Type:  MAC ASA Status:  3          Anesthesia Type: MAC    Micah Phase I: Micah Score: 8    Micah Phase II:      Last vitals: Reviewed and per EMR flowsheets.        Anesthesia Post Evaluation

## 2019-06-10 NOTE — OP NOTE
COLONOSCOPY    DATE OF PROCEDURE: 6/10/2019    SURGEON: Yadira Akbar MD    ASSISTANT: None    PREOPERATIVE DIAGNOSIS: Dropping hemoglobin, patient needs anticoagulation therapy. Past history of angiodysplasia in the cecum about 5 years ago. POSTOPERATIVE DIAGNOSIS: Diverticulosis in the sigmoid colon with severe spasms. Fair preparation. No recurrent angiodysplasia seen in the cecum. OPERATION: Total colonoscopy     ANESTHESIA: MAC    ESTIMATED BLOOD LOSS: None    COMPLICATIONS: None     SPECIMENS:  Was Not Obtained    HISTORY: The patient is a 70y.o. year old female with history of above preop diagnosis. I recommended colonoscopy with possible biopsy or polypectomy and I explained the risk, benefits, expected outcome, and alternatives to the procedure. Risks included but are not limited to bleeding, infection, respiratory distress, hypotension, and perforation of the colon and possibility of missing a lesion. The patient understands and is in agreement. PROCEDURE:  The patient's SPO2 remained above 90% throughout the procedure. Digital rectal exam was normal.  The colonoscope was inserted through the anus into the rectum and advanced under direct vision to the cecum with difficulty. The prep was fair. Findings:      Cecum/Ascending colon: normal, large amount of liquid stool present which was aspirated. No recurrent angiodysplasia seen in the cecum. Transverse colon: normal    Descending/Sigmoid colon: abnormal: Severe diverticulosis, spasms. Had a redundant sigmoid colon. Patient has poor preparation in this area. Could not evaluate satisfactorily because of \"preparation and spasms. Rectum/Anus: examined in normal and retroflexed positions and was normal    Withdrawal Time was (minutes): 15          The colon was decompressed and the scope was removed. The patient tolerated the procedure and conscious sedation without unusual events.      During the procedure, the patient's blood pressure, pulse and oxygen saturation remained stable and documented. No unusual events occurred during the procedure. Patient was transferred to recovery room and will be discharged when criteria is met. Recommendations/Plan:   1. F/U Biopsies  2. F/U In Office as instructed  3. Discussed with the family  4. High fiber diet   5. Precautions to avoid constipation     Next colonoscopy: 3 years.   If Colonoscopy is less than 10 years the recommended reason is due:poor preparation    Electronically signed by Tamiko Wharton MD  on 6/10/2019 at 12:10 PM

## 2019-06-10 NOTE — OP NOTE
ESOPHAGOGASTRODUODENOSCOPY   ( EGD )  DATE OF PROCEDURE: 6/10/2019     SURGEON: Nikolas Spears MD    ASSISTANT: None    PREOPERATIVE DIAGNOSIS: Anemia. Dropping hemoglobin. Patient needs anticoagulation therapy. Procedure performed to evaluate upper GI lesions, to decide anticoagulation therapy. POSTOPERATIVE DIAGNOSIS: No lesion seen up to proximal third part of the duodenum. OPERATION: Upper GI endoscopy     ANESTHESIA: MAC    ESTIMATED BLOOD LOSS: None    COMPLICATIONS: None. SPECIMENS:  Was Not Obtained    HISTORY: The patient is a 70y.o. year old female with history of above preop diagnosis. I recommended esophagogastroduodenoscopy with possible biopsy and I explained the risk, benefits, expected outcome, and alternatives to the procedure. Risks included but are not limited to bleeding, infection, respiratory distress, hypotension, and perforation of the esophagus, stomach, or duodenum. Patient understands and is in agreement. PROCEDURE: The patient was given IV conscious sedation. The patient's SPO2 remained above 90% throughout the procedure. Cetacaine spray given. Patient placed in left lateral position. Olympus  videogastroscope was inserted orally under vision into the esophagus without difficulty and advanced into the stomach then through the pylorus up to the second part of duodenum. Findings:    Retropharyngeal area was grossly normal appearing    Esophagus: normal    Stomach:    Fundus and Cardia Examined in Retroflexed View: normal    Body: normal    Antrum: normal    Duodenum:     Descending: normal    Bulb: normal    While withdrawing the scope the above findings were verified and the scope was removed. The patient has tolerated the procedure and conscious sedation without unusual events. Recommendations/Plan:   1.   2. F/U In Office as instructed  3.  Discussed with the family                   Electronically signed by Nikolas Spears MD  on

## 2019-06-10 NOTE — CARE COORDINATION
nHpredict Inpatient Visit    Patient/family seen: No: patient inn surgery    Provided patient/family with copy of nHpredict tool: No: no one in room     All questions answered at the time of visit. Informed patient/family that the nHpredict is a tool, to be used as a guide, and should not alter their currently established discharge plan.      Current discharge plan: home -refusing vns    Collaboration completed with case management: Yes  nHpredict outcome recommends  To consider home with Svitlana Carpio and PT/OT recommend home with assist, will continue to follow//JU

## 2019-06-11 LAB
ANION GAP SERPL CALCULATED.3IONS-SCNC: 12 MMOL/L (ref 9–17)
BUN BLDV-MCNC: 10 MG/DL (ref 8–23)
BUN/CREAT BLD: NORMAL (ref 9–20)
CALCIUM SERPL-MCNC: 9 MG/DL (ref 8.6–10.4)
CHLORIDE BLD-SCNC: 105 MMOL/L (ref 98–107)
CO2: 24 MMOL/L (ref 20–31)
CREAT SERPL-MCNC: 0.67 MG/DL (ref 0.5–0.9)
GFR AFRICAN AMERICAN: >60 ML/MIN
GFR NON-AFRICAN AMERICAN: >60 ML/MIN
GFR SERPL CREATININE-BSD FRML MDRD: NORMAL ML/MIN/{1.73_M2}
GFR SERPL CREATININE-BSD FRML MDRD: NORMAL ML/MIN/{1.73_M2}
GLUCOSE BLD-MCNC: 97 MG/DL (ref 70–99)
HCT VFR BLD CALC: 22.8 % (ref 36–46)
HEMOGLOBIN: 7 G/DL (ref 12–16)
MCH RBC QN AUTO: 19.7 PG (ref 26–34)
MCHC RBC AUTO-ENTMCNC: 30.8 G/DL (ref 31–37)
MCV RBC AUTO: 64.1 FL (ref 80–100)
NRBC AUTOMATED: ABNORMAL PER 100 WBC
PATHOLOGIST REVIEW: NORMAL
PDW BLD-RTO: 20.3 % (ref 11.5–14.9)
PLATELET # BLD: 352 K/UL (ref 150–450)
PMV BLD AUTO: 8 FL (ref 6–12)
POTASSIUM SERPL-SCNC: 4.2 MMOL/L (ref 3.7–5.3)
RBC # BLD: 3.56 M/UL (ref 4–5.2)
SODIUM BLD-SCNC: 141 MMOL/L (ref 135–144)
WBC # BLD: 5.9 K/UL (ref 3.5–11)

## 2019-06-11 PROCEDURE — 86900 BLOOD TYPING SEROLOGIC ABO: CPT

## 2019-06-11 PROCEDURE — 36415 COLL VENOUS BLD VENIPUNCTURE: CPT

## 2019-06-11 PROCEDURE — 6370000000 HC RX 637 (ALT 250 FOR IP): Performed by: INTERNAL MEDICINE

## 2019-06-11 PROCEDURE — 2580000003 HC RX 258: Performed by: INTERNAL MEDICINE

## 2019-06-11 PROCEDURE — P9016 RBC LEUKOCYTES REDUCED: HCPCS

## 2019-06-11 PROCEDURE — 6360000002 HC RX W HCPCS: Performed by: INTERNAL MEDICINE

## 2019-06-11 PROCEDURE — 86901 BLOOD TYPING SEROLOGIC RH(D): CPT

## 2019-06-11 PROCEDURE — 94640 AIRWAY INHALATION TREATMENT: CPT

## 2019-06-11 PROCEDURE — 36430 TRANSFUSION BLD/BLD COMPNT: CPT

## 2019-06-11 PROCEDURE — 97116 GAIT TRAINING THERAPY: CPT

## 2019-06-11 PROCEDURE — 85027 COMPLETE CBC AUTOMATED: CPT

## 2019-06-11 PROCEDURE — 99232 SBSQ HOSP IP/OBS MODERATE 35: CPT | Performed by: INTERNAL MEDICINE

## 2019-06-11 PROCEDURE — 97110 THERAPEUTIC EXERCISES: CPT

## 2019-06-11 PROCEDURE — 86850 RBC ANTIBODY SCREEN: CPT

## 2019-06-11 PROCEDURE — 99233 SBSQ HOSP IP/OBS HIGH 50: CPT | Performed by: INTERNAL MEDICINE

## 2019-06-11 PROCEDURE — 86920 COMPATIBILITY TEST SPIN: CPT

## 2019-06-11 PROCEDURE — 80048 BASIC METABOLIC PNL TOTAL CA: CPT

## 2019-06-11 PROCEDURE — 2060000000 HC ICU INTERMEDIATE R&B

## 2019-06-11 PROCEDURE — 94761 N-INVAS EAR/PLS OXIMETRY MLT: CPT

## 2019-06-11 RX ORDER — 0.9 % SODIUM CHLORIDE 0.9 %
250 INTRAVENOUS SOLUTION INTRAVENOUS ONCE
Status: COMPLETED | OUTPATIENT
Start: 2019-06-11 | End: 2019-06-12

## 2019-06-11 RX ADMIN — LORAZEPAM 1 MG: 1 TABLET ORAL at 21:09

## 2019-06-11 RX ADMIN — IPRATROPIUM BROMIDE AND ALBUTEROL SULFATE 1 AMPULE: .5; 3 SOLUTION RESPIRATORY (INHALATION) at 11:14

## 2019-06-11 RX ADMIN — AZITHROMYCIN MONOHYDRATE 500 MG: 500 INJECTION, POWDER, LYOPHILIZED, FOR SOLUTION INTRAVENOUS at 18:56

## 2019-06-11 RX ADMIN — GUAIFENESIN 600 MG: 600 TABLET, EXTENDED RELEASE ORAL at 10:13

## 2019-06-11 RX ADMIN — IPRATROPIUM BROMIDE AND ALBUTEROL SULFATE 1 AMPULE: .5; 3 SOLUTION RESPIRATORY (INHALATION) at 19:25

## 2019-06-11 RX ADMIN — Medication 10 ML: at 21:10

## 2019-06-11 RX ADMIN — PANTOPRAZOLE SODIUM 40 MG: 40 TABLET, DELAYED RELEASE ORAL at 05:39

## 2019-06-11 RX ADMIN — METOPROLOL TARTRATE 25 MG: 25 TABLET, FILM COATED ORAL at 21:09

## 2019-06-11 RX ADMIN — METOPROLOL TARTRATE 25 MG: 25 TABLET, FILM COATED ORAL at 10:13

## 2019-06-11 RX ADMIN — Medication 10 ML: at 10:13

## 2019-06-11 RX ADMIN — LISINOPRIL 10 MG: 10 TABLET ORAL at 10:13

## 2019-06-11 RX ADMIN — IPRATROPIUM BROMIDE AND ALBUTEROL SULFATE 1 AMPULE: .5; 3 SOLUTION RESPIRATORY (INHALATION) at 07:14

## 2019-06-11 RX ADMIN — CEFTRIAXONE SODIUM 1 G: 1 INJECTION, POWDER, FOR SOLUTION INTRAMUSCULAR; INTRAVENOUS at 17:55

## 2019-06-11 RX ADMIN — IPRATROPIUM BROMIDE AND ALBUTEROL SULFATE 1 AMPULE: .5; 3 SOLUTION RESPIRATORY (INHALATION) at 15:34

## 2019-06-11 RX ADMIN — GUAIFENESIN 600 MG: 600 TABLET, EXTENDED RELEASE ORAL at 21:09

## 2019-06-11 RX ADMIN — SODIUM CHLORIDE 250 ML: 9 INJECTION, SOLUTION INTRAVENOUS at 13:21

## 2019-06-11 RX ADMIN — TRAMADOL HYDROCHLORIDE 50 MG: 50 TABLET, FILM COATED ORAL at 21:09

## 2019-06-11 RX ADMIN — FUROSEMIDE 20 MG: 10 INJECTION, SOLUTION INTRAMUSCULAR; INTRAVENOUS at 10:13

## 2019-06-11 ASSESSMENT — PAIN SCALES - GENERAL
PAINLEVEL_OUTOF10: 0
PAINLEVEL_OUTOF10: 3

## 2019-06-11 NOTE — CARE COORDINATION
ONGOING DISCHARGE PLAN:    Spoke with patient regarding discharge plan and patient confirms that plan is still to discharge to home with no needs  Pt remains on iv lasix  1 unit rbc given  Pt may need a cardioversion/anticoag outpt      Will continue to follow for additional discharge needs.     Electronically signed by Verena Paget, RN on 6/11/2019 at 1:33 PM

## 2019-06-11 NOTE — CARE COORDINATION
[address] Transitions Interview     2019    Patient: Paul Hoyos Patient : 1940   MRN: [id number]  Reason for Admission: pneumonia  RARS: Readmission Risk Score: 15         Spoke with: patient and family  Patient family member stated would like patient  to go to SNF/[place] /[facility] when discharged, discharge plan at this time is home with no needs? ? Will follow//JU      Readmission Risk  Patient Active Problem List   Diagnosis    Hypertension    Anemia    Cataracts, bilateral    Hepatitis    Diverticulosis    Bell's palsy    Hepatomegaly    Duodenal ulcer    Gastric ulcer    Osteoarthritis    Hyperlipidemia    GI bleed    Lower GI bleed    [facility] [address]    Anxiety    Colitis    Food poisoning    Diarrhea of presumed infectious origin    Iron deficiency anemia due to chronic blood loss    Rectal bleeding    Chronic pain syndrome    Pneumonia due to organism    Atrial fibrillation with rapid ventricular response [facility])       Inpatient Assessment  Care Transitions Summary    Care Transitions Inpatient Review  Medication Review  Housing Review  Social Support  Durable Medical Equipment  Functional Review  Hearing and Vision  Care Transitions Interventions         Follow Up  No future appointments.     Health Maintenance  Health Maintenance Due   Topic Date Due    DEXA (modify frequency per FRAX score)  2007       Leonidas Simmonds, RN

## 2019-06-11 NOTE — PLAN OF CARE
Problem: Falls - Risk of:  Goal: Will remain free from falls  Description  Will remain free from falls  Outcome: Ongoing   Patient remains safe and free from falls. Problem: Falls - Risk of:  Goal: Absence of physical injury  Description  Absence of physical injury  Outcome: Ongoing   Pt assessed as a fall risk this shift. Remains free from falls and accidental injury at this time. Fall precautions in place, including falling star sign and fall risk band on pt. Floor free from obstacles, and bed is locked and in lowest position. Adequate lighting provided. Pt encouraged to call before getting OOB for any need. Bed alarm activated. Will continue to monitor needs during hourly rounding, and reinforce education on use of call light.     Problem: Pain:  Goal: Pain level will decrease  Description  Pain level will decrease  Outcome: Ongoing  Goal: Control of acute pain  Description  Control of acute pain  Outcome: Ongoing  Goal: Control of chronic pain  Description  Control of chronic pain  Outcome: Ongoing

## 2019-06-11 NOTE — PLAN OF CARE
Problem: Falls - Risk of:  Goal: Will remain free from falls  Description  Will remain free from falls  Outcome: Met This Shift     Problem: Falls - Risk of:  Goal: Absence of physical injury  Description  Absence of physical injury  Outcome: Met This Shift     Problem: Pain:  Goal: Pain level will decrease  Description  Pain level will decrease  Outcome: Met This Shift     Problem: Pain:  Goal: Control of acute pain  Description  Control of acute pain  Outcome: Met This Shift     Problem: Pain:  Goal: Control of chronic pain  Description  Control of chronic pain  Outcome: Met This Shift     Problem: Cardiac Output - Decreased:  Goal: Hemodynamic stability will improve  Description  Hemodynamic stability will improve  Outcome: Ongoing     Problem: Musculor/Skeletal Functional Status  Goal: Highest potential functional level  Outcome: Ongoing

## 2019-06-11 NOTE — PROGRESS NOTES
input(s): ALKPHOS, ALT, AST, PROT, BILITOT, BILIDIR, LABALBU in the last 72 hours. Pancreatic functions:No results for input(s): LACTA, AMYLASE in the last 72 hours. S. Lactic Acid: No results for input(s): LACTA in the last 72 hours. Cardiac enzymes:No results for input(s): CKTOTAL, CKMB, CKMBINDEX, TROPONINI in the last 72 hours. BNP:No results for input(s): BNP in the last 72 hours. Lipid profile: No results for input(s): CHOL, TRIG, HDL, LDLCALC in the last 72 hours. Invalid input(s): LDL  Blood Gases: No results found for: PH, PCO2, PO2, HCO3, O2SAT  Thyroid functions:   Lab Results   Component Value Date    TSH 1.94 06/07/2019        Imaging/Diagonstics:  Xr Chest Standard (2 Vw)    Result Date: 6/7/2019  EXAMINATION: TWO XRAY VIEWS OF THE CHEST 6/7/2019 9:36 am COMPARISON: 06/06/2019, 1427 hours HISTORY: ORDERING SYSTEM PROVIDED HISTORY: Pneumonia TECHNOLOGIST PROVIDED HISTORY: Pneumonia Ordering Physician Provided Reason for Exam: follow up pneumonia Acuity: Acute Type of Exam: Initial 28-year-old female with pneumonia; follow-up FINDINGS: Cardiac monitor leads overlie the chest. Stable mild cardiomegaly. Atherosclerotic calcification of the thoracic aorta. Cardiac and mediastinal contours remain unchanged. Trachea midline. No pneumothorax. No free air. Persistent right basilar airspace disease and small right-sided pleural effusion. Left lung remains relatively clear. Underlying COPD. Mild diffuse degenerative changes throughout the spine. Atherosclerotic calcification of the upper abdominal aorta. 1. Stable right basilar airspace disease and small right-sided pleural effusion, likely pneumonia. Follow-up is recommended to document resolution. 2. Underlying COPD. 3. Stable mild cardiomegaly.      Xr Chest Portable    Result Date: 6/6/2019  EXAMINATION: ONE XRAY VIEW OF THE CHEST 6/6/2019 2:24 pm COMPARISON: Chest radiograph dated 07/08/2014 HISTORY: ORDERING SYSTEM PROVIDED HISTORY: Chest Pain TECHNOLOGIST PROVIDED HISTORY: Chest Pain Ordering Physician Provided Reason for Exam: Chest Pain Acuity: Unknown Type of Exam: Unknown FINDINGS: Heart size is normal considering AP technique. Infiltrate at the right lung base. No effusions. No pneumothorax. No free air below the diaphragm. Infiltrate at the right lung base. Recommend chest radiograph after treatment to ensure resolution. ASSESSMENT     Patient Active Problem List   Diagnosis    Hypertension    Anemia    Cataracts, bilateral    Hepatitis    Diverticulosis    Bell's palsy    Hepatomegaly    Duodenal ulcer    Gastric ulcer    Osteoarthritis    Hyperlipidemia    GI bleed    Lower GI bleed    Ileus ([address]    Anxiety    Colitis    Food poisoning    Diarrhea of presumed infectious origin    Iron deficiency anemia due to chronic blood loss    Rectal bleeding    Chronic pain syndrome    Pneumonia due to organism    Atrial fibrillation with rapid ventricular response (HCC)       Pt with pneumonia   Noted to be in A fib - stop heparin echo noted  Use ASA   Hb is 7.1       egd  Neg   colo diverticulosis   no active bleeding   hb 7   feels weak     afiv vr controlled   pneumonia better    PLAN      1.  cont iv rocephin/ azithro  2.  aerosols     prbc 1 unit today   cont iv lasix today      off asa         MD GILSON Phillips [address], [address] [place] [address].    Phone [phone]   Fax: [phone]  Answering Service: [phone]

## 2019-06-11 NOTE — PROGRESS NOTES
Physical Therapy  Facility/Department: [facility] AND [facility] PROGRESSIVE CARE  Daily Treatment Note  NAME: Derrell Juarez  : 1940  MRN: [id number]    Date of Service: 2019    Discharge Recommendations:  Home with assist PRN        Assessment   Body structures, Functions, Activity limitations: Decreased functional mobility ; Decreased endurance  Assessment: Pt requires CGA/SBA for safety with mobility/stairs. Pt would benefit from additional PT to maximize her potential and increase safety. Patient Education: POC, Safety awareness, importance of gait belt, strengthening ex's. REQUIRES PT FOLLOW UP: Yes  Activity Tolerance  Activity Tolerance: Patient Tolerated treatment well     Patient Diagnosis(es): The primary encounter diagnosis was Pneumonia due to organism. A diagnosis of Atrial fibrillation, unspecified type [facility]) was also pertinent to this visit. has a past medical history of Anemia, Anxiety, Arthritis, Bell's palsy, Bright red blood per rectum, Cataracts, bilateral, DDD (degenerative disc disease), Gastric ulcer, Hepatitis, Hyperlipidemia, Hypertension, Inguinal hernia, MRSA (methicillin resistant staph aureus) culture positive, and Osteoarthritis. has a past surgical history that includes partial hysterectomy (cervix not removed) (); bladder suspension (); Cataract removal (Left, ); Eye surgery; Upper gastrointestinal endoscopy (2014); Inguinal hernia repair (); Colonoscopy (9/10/14); Upper gastrointestinal endoscopy (9/10/14); eye surgery (Left); Upper gastrointestinal endoscopy (N/A, 6/10/2019); and Colonoscopy (N/A, 6/10/2019). Restrictions  Restrictions/Precautions  Restrictions/Precautions: Fall Risk, Isolation  Required Braces or Orthoses?: No  Subjective   General  Chart Reviewed:  Yes  Additional Pertinent Hx: Pt presents to ER with worsening fatigue, nausea, HA. CXR: R infiltrate, also found to have new onset A fib  Response To Previous Treatment: Patient with no complaints from

## 2019-06-11 NOTE — PROGRESS NOTES
RN remained w/ pt for the first 15 min of the PRBC transfusion. No s/s of a reaction noted. See Mar and blood flowsheet.  RN will continue to monitor

## 2019-06-11 NOTE — PROGRESS NOTES
[facility]   Progress Note                   Date:   6/11/2019  Patient name: Loretta Tang  Date of admission:  6/6/2019  2:06 PM  MRN:   [id number]  YOB: 1940  PCP: Pantera Hoang MD    Reason for Admission: AFIB    Subjective:   Pt seen and examined at bedside. No acute complaints today. No overnight events. Denies chest pain, palpations, SOB, or lower extremity edema. S/P EGD/Colonoscopy yesterday. Biopsies obtained. Intake/Output Summary (Last 24 hours) at 6/11/2019 1120  Last data filed at 6/11/2019 0730  Gross per 24 hour   Intake 1290 ml   Output 1150 ml   Net 140 ml       Medications:   Scheduled Meds:   sodium chloride  250 mL Intravenous Once    metoprolol tartrate  25 mg Oral BID    bisacodyl  10 mg Oral Once    guaiFENesin  600 mg Oral BID    azithromycin  500 mg Intravenous Q24H    cefTRIAXone (ROCEPHIN) IV  1 g Intravenous Q24H    furosemide  20 mg Intravenous Daily    sodium chloride flush  10 mL Intravenous 2 times per day    lisinopril  10 mg Oral Daily    pantoprazole  40 mg Oral QAM AC    ipratropium-albuterol  1 ampule Inhalation Q4H WA     Continuous Infusions:  CBC:   Recent Labs     06/09/19  1620 06/09/19  2342 06/10/19  0603 06/11/19  0526   WBC 5.6  --  5.8 5.9   HGB 7.3*  --  7.7* 7.0*    316 358 352     BMP:    Recent Labs     06/09/19  0557 06/10/19  0603 06/11/19  0526    138 141   K 4.7 4.6 4.2    101 105   CO2 24 23 24   BUN 8 9 10   CREATININE 0.59 0.63 0.67   GLUCOSE 105* 102* 97     Hepatic: No results for input(s): AST, ALT, ALB, BILITOT, ALKPHOS in the last 72 hours. Troponin: No results for input(s): TROPONINI in the last 72 hours. BNP: No results for input(s): BNP in the last 72 hours. Lipids: No results for input(s): CHOL, HDL in the last 72 hours. Invalid input(s): LDLCALCU  INR: No results for input(s): INR in the last 72 hours.     Objective:   Vitals: /76   Pulse 99   Temp 97.7 °F (36.5 °C) (Oral)   Resp 18   Ht 5' 7\" (1.702 m)   Wt 167 lb 5.3 oz (75.9 kg)   SpO2 95%   BMI 26.21 kg/m²   Constitutional and General Appearance: alert, cooperative  Respiratory:  · Clear to auscultation bilaterally, with equal air entry  Cardiovascular:  · Irregular heart rate, no pain on palpation of anterior chest wall. Abdomen:   · No masses or tenderness, soft abdomen  · Bowel sounds present  Extremities:  · peripheral pulse bl le present 1 +  Integumentum:   Intact with no rashes noted      EK2019: Afib with RVR    ECHO:   Echo:19  Summary  Left ventricle is normal in size and wall thickness. Global left ventricular systolic function is normal Estimated ejection  fraction is 65 % . Moderately dilated RA and LA. Normal right ventricular size and function. Aortic leaflet calcification with no stenosis. Mild aortic insufficiency. Mitral annular calcification is seen. Mild mitral regurgitation. Moderate tricuspid regurgitation. Estimated right ventricular systolic pressure is 44 mmHg. Mild to Moderate pulmonary hypertension. Technically difficult visualization of the pulmonic valve, no abnormality  seen. Normal aortic root dimension.         Assessment / Acute Cardiac Problems:       Patient Active Problem List:     Hypertension     Anemia     Cataracts, bilateral     Hepatitis     Diverticulosis     Bell's palsy     Hepatomegaly     Duodenal ulcer     Gastric ulcer     Osteoarthritis     Hyperlipidemia     GI bleed     Lower GI bleed     Ileus ([address]     Anxiety     Colitis     Food poisoning     Diarrhea of presumed infectious origin     Iron deficiency anemia due to chronic blood loss     Rectal bleeding     Chronic pain syndrome     Pneumonia due to organism     Atrial fibrillation with rapid ventricular response (HCC)      Plan of Treatment:   Afib, rate control with Lopressor 25 mg BID. Anticoagulation discontinued due to dropping H&H. S/p EGD/Colonoscopy yesterday.  Will need PRBC transfusion. Plan for outpatient follow up for further discussion of Cardioversion and Anticoagulation once hemodynamically stable. Will discuss with rounding attending  for final recommendations. Josette Stout MD  Cardiology Fellow    Attending Physician Statement  I have discussed the care of [facility], including pertinent history and exam findings,  with the cardiology fellow/resident. I have seen and examined the patient and the key elements of all parts of the encounter have been performed by me. I agree with the assessment, plan and orders as documented by the resident with additional recommendations as below:     Remains in atrial fibrillation, HR ranging , Hb 7.0 this AM and receiving PRBC transfusion, s/p EGD/Colonoscopy, BP is normal. Will continue lopressor 25 mg bid for rate control. Will consider VIKTOR/CV once acute issues are resolved and her hb is better, most likely as OP.  She is deemed not a candidate for anticoagulation for now due to severe anemia, will recommend starting asa 81 mg qd if ok with GI.        Dolly meet Karoline Jiménez MD   [facility]  [facility], [place]  [phone]

## 2019-06-11 NOTE — PROGRESS NOTES
[facility]: [facility]ENDO[facility]   OCCUPATIONAL THERAPY MISSED TREATMENT NOTE   INPATIENT   Date: 19  Patient Name: Stephen Mike       Room: 1494/9598-47  MRN: [id number]   Account #: [de-identified]    : 1940  (79 y.o.)  Gender: female   Referring Practitioner: Dr Jyoti Paris   Diagnosis: Pneumonia              REASON FOR MISSED TREATMENT:  Patient unable to participate   -   Blood transfusion in progress         DELGADO Contreras

## 2019-06-11 NOTE — PROGRESS NOTES
119/76   Pulse 99   Temp 97.7 °F (36.5 °C) (Oral)   Resp 18   Ht 5' 7\" (1.702 m)   Wt 167 lb 5.3 oz (75.9 kg)   SpO2 97%   BMI 26.21 kg/m²   Temp (24hrs), Av.2 °F (36.2 °C), Min:96.8 °F (36 °C), Max:98.4 °F (36.9 °C)    No results for input(s): POCGLU in the last 72 hours. I/O (24Hr):     Intake/Output Summary (Last 24 hours) at 2019 1102  Last data filed at 2019 0730  Gross per 24 hour   Intake 1290 ml   Output 1150 ml   Net 140 ml       Labs:      CBC: Lab Results   Component Value Date    WBC 5.9 2019    RBC 3.56 2019    HGB 7.0 2019    HCT 22.8 2019    MCV 64.1 2019    MCH 19.7 2019    MCHC 30.8 2019    RDW 20.3 2019     2019    MPV 8.0 2019     CBC with Differential:  Lab Results   Component Value Date    WBC 5.9 2019    RBC 3.56 2019    HGB 7.0 2019    HCT 22.8 2019     2019    MCV 64.1 2019    MCH 19.7 2019    MCHC 30.8 2019    RDW 20.3 2019    LYMPHOPCT 12 2019    MONOPCT 10 2019    BASOPCT 1 2019    MONOSABS 0.79 2019    LYMPHSABS 0.95 2019    EOSABS 0.00 2019    BASOSABS 0.08 2019    DIFFTYPE NOT REPORTED 2019     Hemoglobin/Hematocrit:  Lab Results   Component Value Date    HGB 7.0 2019    HCT 22.8 2019     CMP:  Lab Results   Component Value Date     2019    K 4.2 2019     2019    CO2 24 2019    BUN 10 2019    CREATININE 0.67 2019    GFRAA >60 2019    LABGLOM >60 2019    GLUCOSE 97 2019    PROT 7.6 2019    LABALBU 3.6 2019    CALCIUM 9.0 2019    BILITOT 0.46 2019    ALKPHOS 63 2019    AST 19 2019    ALT 11 2019     BMP:  Lab Results   Component Value Date     2019    K 4.2 2019     2019    CO2 24 2019    BUN 10 2019    LABALBU 3.6 2019 CREATININE 0.67 06/11/2019    CALCIUM 9.0 06/11/2019    GFRAA >60 06/11/2019    LABGLOM >60 06/11/2019    GLUCOSE 97 06/11/2019     PT/INR:    Lab Results   Component Value Date    PROTIME 11.0 02/06/2017    INR 1.0 02/06/2017     PTT:    Lab Results   Component Value Date    APTT 35.2 06/06/2019   [APTT}    Physical Examination:        General appearance: alert, cooperative and no distress. Pale  Mental Status: oriented to person, place and time and normal affect  Abdomen: soft, nontender, nondistended, bowel sounds present     Assessment:        Primary Problem  Pneumonia due to organism     Active Hospital Problems    Diagnosis Date Noted    Atrial fibrillation with rapid ventricular response ([facility] [I48.91] 06/07/2019    Pneumonia due to organism [J18.9] 06/06/2019     Past Medical History:   Diagnosis Date    Anemia     Anxiety     Arthritis     Bell's palsy     Bright red blood per rectum     Cataracts, bilateral     DDD (degenerative disc disease)     Gastric ulcer 2014    per egd    Hepatitis 1965    Hyperlipidemia     Hypertension     Inguinal hernia     MRSA (methicillin resistant staph aureus) culture positive 02/22/2017    urine    Osteoarthritis         Plan:        1. Will receive 1 unit PRBCs today  2. Continue PPI  3. May resume anticoagulation  4. Follow-up outpatient 2-3 weeks  5. Will follow  I have discussed the care of  this patient and I have examined the patient myselft and taken ros and hpi , including pertinent history and exam findings,  with the Nurse Practitioner   I have reviewed the key elements of all parts of the encounter with the Nurse Practitioner . I agree with the assessment, plan and orders as documented by the  NP. Patient hemoglobin is about 7 g. No signs of acute bleeding. Discussed with the patient regarding EGD and colonoscopy findings. Advised to see in the office in the next 3 to 4 weeks.   At that time will be how to repeat her stool for occult blood in the office. If it is still positive then she may need small bowel exam.  Discussed with admitting physician regarding plan.     Explained to the patient and d/W Nursing Staff  Will F/U with you  Please call or Page for any issues or change in status  Thanks    Electronically signed by JAMES Gaviria NP on 6/11/2019 at 11:02 AM

## 2019-06-12 VITALS
SYSTOLIC BLOOD PRESSURE: 122 MMHG | HEART RATE: 96 BPM | BODY MASS INDEX: 26.26 KG/M2 | WEIGHT: 167.33 LBS | OXYGEN SATURATION: 90 % | TEMPERATURE: 98.1 F | RESPIRATION RATE: 17 BRPM | HEIGHT: 67 IN | DIASTOLIC BLOOD PRESSURE: 71 MMHG

## 2019-06-12 LAB
ANION GAP SERPL CALCULATED.3IONS-SCNC: 13 MMOL/L (ref 9–17)
BUN BLDV-MCNC: 8 MG/DL (ref 8–23)
BUN/CREAT BLD: ABNORMAL (ref 9–20)
CALCIUM SERPL-MCNC: 9.2 MG/DL (ref 8.6–10.4)
CHLORIDE BLD-SCNC: 102 MMOL/L (ref 98–107)
CO2: 23 MMOL/L (ref 20–31)
CREAT SERPL-MCNC: 0.58 MG/DL (ref 0.5–0.9)
CULTURE: NORMAL
CULTURE: NORMAL
GFR AFRICAN AMERICAN: >60 ML/MIN
GFR NON-AFRICAN AMERICAN: >60 ML/MIN
GFR SERPL CREATININE-BSD FRML MDRD: ABNORMAL ML/MIN/{1.73_M2}
GFR SERPL CREATININE-BSD FRML MDRD: ABNORMAL ML/MIN/{1.73_M2}
GLUCOSE BLD-MCNC: 103 MG/DL (ref 70–99)
HCT VFR BLD CALC: 29 % (ref 36–46)
HEMOGLOBIN: 9 G/DL (ref 12–16)
Lab: NORMAL
Lab: NORMAL
MCH RBC QN AUTO: 20.7 PG (ref 26–34)
MCHC RBC AUTO-ENTMCNC: 30.9 G/DL (ref 31–37)
MCV RBC AUTO: 67.1 FL (ref 80–100)
NRBC AUTOMATED: ABNORMAL PER 100 WBC
PDW BLD-RTO: 24 % (ref 11.5–14.9)
PLATELET # BLD: 393 K/UL (ref 150–450)
PLATELET # BLD: 409 K/UL (ref 150–450)
PMV BLD AUTO: 7.7 FL (ref 6–12)
POTASSIUM SERPL-SCNC: 3.9 MMOL/L (ref 3.7–5.3)
RBC # BLD: 4.32 M/UL (ref 4–5.2)
SODIUM BLD-SCNC: 138 MMOL/L (ref 135–144)
SPECIMEN DESCRIPTION: NORMAL
SPECIMEN DESCRIPTION: NORMAL
WBC # BLD: 6 K/UL (ref 3.5–11)

## 2019-06-12 PROCEDURE — 94761 N-INVAS EAR/PLS OXIMETRY MLT: CPT

## 2019-06-12 PROCEDURE — 94640 AIRWAY INHALATION TREATMENT: CPT

## 2019-06-12 PROCEDURE — 85049 AUTOMATED PLATELET COUNT: CPT

## 2019-06-12 PROCEDURE — 6370000000 HC RX 637 (ALT 250 FOR IP): Performed by: INTERNAL MEDICINE

## 2019-06-12 PROCEDURE — 2580000003 HC RX 258: Performed by: INTERNAL MEDICINE

## 2019-06-12 PROCEDURE — 97535 SELF CARE MNGMENT TRAINING: CPT

## 2019-06-12 PROCEDURE — 99239 HOSP IP/OBS DSCHRG MGMT >30: CPT | Performed by: INTERNAL MEDICINE

## 2019-06-12 PROCEDURE — 80048 BASIC METABOLIC PNL TOTAL CA: CPT

## 2019-06-12 PROCEDURE — 6360000002 HC RX W HCPCS: Performed by: INTERNAL MEDICINE

## 2019-06-12 PROCEDURE — 85027 COMPLETE CBC AUTOMATED: CPT

## 2019-06-12 PROCEDURE — 36415 COLL VENOUS BLD VENIPUNCTURE: CPT

## 2019-06-12 RX ADMIN — PANTOPRAZOLE SODIUM 40 MG: 40 TABLET, DELAYED RELEASE ORAL at 06:43

## 2019-06-12 RX ADMIN — LISINOPRIL 10 MG: 10 TABLET ORAL at 09:28

## 2019-06-12 RX ADMIN — FUROSEMIDE 20 MG: 10 INJECTION, SOLUTION INTRAMUSCULAR; INTRAVENOUS at 09:28

## 2019-06-12 RX ADMIN — Medication 10 ML: at 09:28

## 2019-06-12 RX ADMIN — IPRATROPIUM BROMIDE AND ALBUTEROL SULFATE 1 AMPULE: .5; 3 SOLUTION RESPIRATORY (INHALATION) at 07:21

## 2019-06-12 RX ADMIN — GUAIFENESIN 600 MG: 600 TABLET, EXTENDED RELEASE ORAL at 09:28

## 2019-06-12 RX ADMIN — IPRATROPIUM BROMIDE AND ALBUTEROL SULFATE 1 AMPULE: .5; 3 SOLUTION RESPIRATORY (INHALATION) at 11:18

## 2019-06-12 RX ADMIN — METOPROLOL TARTRATE 25 MG: 25 TABLET, FILM COATED ORAL at 09:28

## 2019-06-12 ASSESSMENT — PAIN SCALES - GENERAL: PAINLEVEL_OUTOF10: 0

## 2019-06-12 NOTE — PROGRESS NOTES
Physical Therapy  DATE: 2019    NAME: Loretta Tang  MRN: [id number]   : 1940    Patient not seen this date for Physical Therapy due to:  [] Blood transfusion in progress  [] Hemodialysis  []  Patient Declined  [] Spine Precautions   [] Strict Bedrest  [] Surgery/ Procedure  [] Testing      [x] Other  Pt is with RN going over D/C paperwork at this time. [] PT being discontinued at this time. Patient independent. No further needs. [] PT being discontinued at this time as the patient has been transferred to palliative care. No further needs.     Shamir Hoyt, PTA Yes

## 2019-06-12 NOTE — PLAN OF CARE
Problem: Falls - Risk of:  Goal: Will remain free from falls  Description  Will remain free from falls  6/12/2019 0513 by Raji Monaco RN  Outcome: Ongoing   Patient remains safe and free from falls. Problem: Falls - Risk of:  Goal: Absence of physical injury  Description  Absence of physical injury  6/12/2019 0513 by Raji Monaco RN  Outcome: Ongoing   Pt assessed as a fall risk this shift. Remains free from falls and accidental injury at this time. Fall precautions in place, including falling star sign and fall risk band on pt. Floor free from obstacles, and bed is locked and in lowest position. Adequate lighting provided. Pt encouraged to call before getting OOB for any need. Bed alarm activated. Will continue to monitor needs during hourly rounding, and reinforce education on use of call light.     Problem: Pain:  Goal: Pain level will decrease  Description  Pain level will decrease  6/12/2019 0513 by Raji Monaco RN  Outcome: Ongoing     Problem: Pain:  Goal: Control of acute pain  Description  Control of acute pain  6/12/2019 0513 by Raji Monaco RN  Outcome: Ongoing     Problem: Pain:  Goal: Control of chronic pain  Description  Control of chronic pain  6/12/2019 0513 by Raji Monaco RN  Outcome: Ongoing

## 2019-06-12 NOTE — PROGRESS NOTES
WENT INTO PT ROOM. PT WAS OFF O2 PT STATES SHE ONLY REALLY NEEDS IT AT NIGHT TIME. CHECKED O2 POOR PERFUSION DUE TO HANDS BEING COLD. WARMED HAND AND DID GET A SPO2 OF 91% WHILE ON ROOM AIR. GAVE TX WITH O2 AND AFTER TX PUT PT BACK ON O2 AT HER NORMAL 3.5L. PT SPO2 DID INCREASE TO 98% .

## 2019-06-12 NOTE — PROGRESS NOTES
[address] [facility]    INPATIENT OCCUPATIONAL THERAPY  PROGRESS NOTE  Date: 2019  Patient Name: Akua Gould      Room: 5606/5550-59  MRN: [id number]    : 1940  (79 y.o.) Gender: female     Discharge Recommendations:  Home with assist PRN  Equipment Needed: (To Be Determined )    Referring Practitioner: Dr Matty Ventura   Diagnosis: Pneumonia   General  Chart Reviewed: Yes, Progress Notes, History and Physical  Patient assessed for rehabilitation services?: Yes  Additional Pertinent Hx: Infrequent recurrent UTI   Family / Caregiver Present: Yes  Referring Practitioner: Dr Matty Ventura   Diagnosis: Pneumonia     Restrictions  Restrictions/Precautions: Fall Risk, Isolation  Required Braces or Orthoses?: No      Subjective  Subjective: \"I'm going to soak my feet in this bucket\" Pt declining A despite education on fall risk/safety concerns  Comments: Pt pleasant, ambulating per self in room. Pt reports she is hopeful to d/c home today. Patient Currently in Pain: Denies  Overall Orientation Status: Within Functional Limits  Patient Observation  Observations: pt demo difficulty sitting down and prefers to be 'up and doing something'; pt report this is her typical behavior       Objective       Balance  Sitting Balance: Modified independent   Standing Balance: Modified independent   Standing Balance  Time: 5min x3+  Activity: ADLs/mobility tasks      ADL  Grooming: Modified independent   UE Bathing: Modified independent   LE Bathing: Modified independent   UE Dressing: Modified independent   LE Dressing: Modified independent   Toileting: Modified independent   Additional Comments: Pt completed full ADL, prefers standing tasks. poor reception to pacing and EC tech. Pt nonrecpetive to cuing but able to complete tasks c safety concerns. Pt notes after full ADL she will 'soak her feet' in a basin. Writer offered A c task/setup for increased safety c pt adamant to complete per self.  RN notified of pt plan to transport full basin and soak feet. Assessment  Performance deficits / Impairments: Decreased functional mobility ; Decreased endurance;Decreased ADL status; Decreased safe awareness;Decreased strength  Assessment: D/C from OT; nonreceptive to therapy services; unable to ID return/carryover  Prognosis: Good  Discharge Recommendations: Home with assist PRN  Activity Tolerance: Patient limited by fatigue  Safety Devices in place: Yes  Type of devices: Left in bed;Call light within reach             Patient Education:  Patient Education: OT POC, safety, conditioning, MOdified care strategies   Learner:patient  Method: demonstration and explanation       Outcome: Nonreceptive to therapy services; DC from [address] [place] [address] in place: Yes  Type of devices: Left in bed, Call light within reach  Plan  Times per week: 2-5 sessions  Times per day: Daily  Current Treatment Recommendations: Strengthening, Functional Mobility Training, Endurance Training, Cognitive Reorientation, Positioning, Safety Education & Training, Self-Care / ADL, Patient/Caregiver Education & Training      Goals  Short term goals  Time Frame for Short term goals: 2-5 days  Short term goal 1: Tolerate 10-25 minutes of General Activity without fatigue to support care demands   Short term goal 2: D/V understnaidongof Home Safety and fall prevention strategies with application to self care needs  Short term goal 3: Participate in care uising safe techniques for mobility and self care tasks   Short term goal 4: D/V understanding of DME / Cathalene Lorrie / Modified Care Techniques with application to ADL tasks     OT Individual Minutes  Time In: 1109  Time Out: 1147  Minutes: 38      Electronically signed by DELGADO Sexton on 6/12/19 at 4:40 PM

## 2019-06-12 NOTE — DISCHARGE SUMMARY
[facility] SERVICE       Discharge Summary     Patient ID: Yohan Gupta  :  1940   MRN: [id number]     ACCOUNT:  [de-identified]   Patient's PCP: Zach Lucas MD  Admit Date: 2019   Discharge Date: 2019     Length of Stay: 6  Code Status:  Prior  Admitting Physician: Chuck Kimble MD  Discharge Physician: Zaira Nguyen MD     Active Discharge Diagnoses:     Hospital Problem Lists:  Principal Problem:    Pneumonia due to organism  Active Problems:    Atrial fibrillation with rapid ventricular response ([address]  Resolved Problems:    * No resolved hospital problems. *      Admission Condition:  fair     Discharged Condition: fair    Hospital Stay:     Hospital Course:  Yohan Gupta is a 70 y.o. female who was admitted for the management of   Pneumonia due to organism , presented to ER with Nausea; Headache; and Fatigue      The patient is a 70 y.o.  female who is admitted to the hospital for  Pt being rx for pnemonia sob better able to walk with out sympts   Hb today is 7.3      Pt is known to have AVM of caecum     Pt with pneumonia   Noted to be in A fib - stop heparin echo noted  Use ASA   Hb is 7. 1         egd  Neg   colo diverticulosis   no active bleeding   hb 7   feels weak     afiv vr controlled   pneumonia better        Hb 9 post prbc    Significant therapeutic interventions:     Significant Diagnostic Studies:   Labs / Micro:  CBC:   Lab Results   Component Value Date    WBC 6.0 2019    RBC 4.32 2019    HGB 9.0 2019    HCT 29.0 2019    MCV 67.1 2019    MCH 20.7 2019    MCHC 30.9 2019    RDW 24.0 2019     2019     BMP:    Lab Results   Component Value Date    GLUCOSE 103 2019     2019    K 3.9 2019     2019    CO2 23 2019    ANIONGAP 13 2019    BUN 8 2019    CREATININE 0.58 2019    BUNCRER NOT REPORTED 2019    CALCIUM 9.2 2019    LABGLOM prescriptions for required medications, discharge plan and follow up. Electronically signed by   Anastacia Mark MD  6/12/2019  5:48 PM      Thank you Dr. Lester Rivero MD for the opportunity to be involved in this patient's care.

## 2019-06-12 NOTE — PLAN OF CARE
Problem: Falls - Risk of:  Goal: Will remain free from falls  Description  Will remain free from falls  6/12/2019 1206 by Yusuf Berger RN  Outcome: Completed     Problem: Falls - Risk of:  Goal: Absence of physical injury  Description  Absence of physical injury  6/12/2019 1206 by Yusuf Berger RN  Outcome: Completed     Problem: Pain:  Goal: Pain level will decrease  Description  Pain level will decrease  6/12/2019 1206 by Yusuf Berger RN  Outcome: Completed     Problem: Pain:  Goal: Control of acute pain  Description  Control of acute pain  6/12/2019 1206 by Yusuf Berger RN  Outcome: Completed     Problem: Pain:  Goal: Control of chronic pain  Description  Control of chronic pain  6/12/2019 1206 by Yusuf Berger RN  Outcome: Completed     Problem: Cardiac Output - Decreased:  Goal: Hemodynamic stability will improve  Description  Hemodynamic stability will improve  6/12/2019 1206 by Yusuf Berger RN  Outcome: Completed     Problem: Musculor/Skeletal Functional Status  Goal: Highest potential functional level  6/12/2019 1206 by Yusuf Berger RN  Outcome: Completed

## 2019-06-12 NOTE — PROGRESS NOTES
GI Progress notes    6/12/2019   12:13 PM    Name:  Kevin De  MRN:    [id number]     Acct:     [de-identified]   Room:  2088/2088[facility] Day: 6     Admit Date: 6/6/2019  2:06 PM  PCP: Isaias Nicolas MD    Subjective:     C/C:   Chief Complaint   Patient presents with   Jones Nausea    Headache    Fatigue       Interval History: Status: improved. No acute events overnight. VSS. Hemoglobin improved 9.0 from 7.0 yesterday. No acute bleeding. Had BM, no melena, hematochezia. Denies constipation, diarrhea, nausea, vomiting, dysphagia. ROS:  Constitutional: negative for chills, fevers and sweats  Gastrointestinal: negative for abdominal pain, constipation, diarrhea, nausea and vomiting      Medications: Allergies:    Allergies   Allergen Reactions    Bee Pollen Anaphylaxis    Iron Rash       Current Meds:   metoprolol tartrate (LOPRESSOR) tablet 25 mg BID   bisacodyl (DULCOLAX) EC tablet 10 mg Once   guaiFENesin (MUCINEX) extended release tablet 600 mg BID   azithromycin (ZITHROMAX) 500 mg in D5W 250ml addavial Q24H   cefTRIAXone (ROCEPHIN) 1 g IVPB in 50 mL D5W minibag Q24H   furosemide (LASIX) injection 20 mg Daily   sodium chloride flush 0.9 % injection 10 mL 2 times per day   sodium chloride flush 0.9 % injection 10 mL PRN   acetaminophen (TYLENOL) tablet 650 mg Q4H PRN   lisinopril (PRINIVIL;ZESTRIL) tablet 10 mg Daily   LORazepam (ATIVAN) tablet 1 mg Q8H PRN   pantoprazole (PROTONIX) tablet 40 mg QAM AC   polyvinyl alcohol (LIQUIFILM TEARS) 1.4 % ophthalmic solution 2 drop PRN   traMADol (ULTRAM) tablet 50 mg Q4H PRN   polyethylene glycol (GLYCOLAX) packet 17 g Daily PRN   benzocaine-menthol (CEPACOL SORE THROAT) lozenge 1 lozenge Q2H PRN   magnesium hydroxide (MILK OF MAGNESIA) 400 MG/5ML suspension 30 mL Daily PRN   ondansetron (ZOFRAN) injection 4 mg Q6H PRN   albuterol (PROVENTIL) nebulizer solution 2.5 mg Q2H PRN   ipratropium-albuterol (DUONEB) nebulizer solution 1 ampule Q4H WA       Data: Code Status:  Full Code    Family History   Problem Relation Age of Onset    Arthritis Mother     Heart Disease Mother     High Blood Pressure Mother     Cancer Brother 61        bone    Cancer Paternal Grandmother         throat age [de-identified]       Social History     Socioeconomic History    Marital status:      Spouse name: Not on file    Number of children: Not on file    Years of education: Not on file    Highest education level: Not on file   Occupational History    Not on file   Social Needs    Financial resource strain: Not on file    Food insecurity:     Worry: Not on file     Inability: Not on file    Transportation needs:     Medical: Not on file     Non-medical: Not on file   Tobacco Use    Smoking status: Former Smoker     Packs/day: 0.50     Years: 50.00     Pack years: 25.00    Smokeless tobacco: Never Used   Substance and Sexual Activity    Alcohol use: No    Drug use: No    Sexual activity: Not Currently   Lifestyle    Physical activity:     Days per week: Not on file     Minutes per session: Not on file    Stress: Not on file   Relationships    Social connections:     Talks on phone: Not on file     Gets together: Not on file     Attends Hindu service: Not on file     Active member of club or organization: Not on file     Attends meetings of clubs or organizations: Not on file     Relationship status: Not on file    Intimate partner violence:     Fear of current or ex partner: Not on file     Emotionally abused: Not on file     Physically abused: Not on file     Forced sexual activity: Not on file   Other Topics Concern    Not on file   Social History Narrative    Not on file       Vitals:  /71   Pulse 96   Temp 98.1 °F (36.7 °C) (Oral)   Resp 17   Ht 5' 7\" (1.702 m)   Wt 167 lb 5.3 oz (75.9 kg)   SpO2 90%   BMI 26.21 kg/m²   Temp (24hrs), Av.1 °F (36.7 °C), Min:97.7 °F (36.5 °C), Max:98.5 °F (36.9 °C)    No results for input(s): POCGLU in the last 72 hours. I/O (24Hr):     Intake/Output Summary (Last 24 hours) at 6/12/2019 1213  Last data filed at 6/12/2019 0705  Gross per 24 hour   Intake 455 ml   Output 2700 ml   Net -2245 ml       Labs:      CBC: Lab Results   Component Value Date    WBC 6.0 06/12/2019    RBC 4.32 06/12/2019    HGB 9.0 06/12/2019    HCT 29.0 06/12/2019    MCV 67.1 06/12/2019    MCH 20.7 06/12/2019    MCHC 30.9 06/12/2019    RDW 24.0 06/12/2019     06/12/2019    MPV 7.7 06/12/2019     CBC with Differential:  Lab Results   Component Value Date    WBC 6.0 06/12/2019    RBC 4.32 06/12/2019    HGB 9.0 06/12/2019    HCT 29.0 06/12/2019     06/12/2019    MCV 67.1 06/12/2019    MCH 20.7 06/12/2019    MCHC 30.9 06/12/2019    RDW 24.0 06/12/2019    LYMPHOPCT 12 06/06/2019    MONOPCT 10 06/06/2019    BASOPCT 1 06/06/2019    MONOSABS 0.79 06/06/2019    LYMPHSABS 0.95 06/06/2019    EOSABS 0.00 06/06/2019    BASOSABS 0.08 06/06/2019    DIFFTYPE NOT REPORTED 06/06/2019     Hemoglobin/Hematocrit:  Lab Results   Component Value Date    HGB 9.0 06/12/2019    HCT 29.0 06/12/2019     CMP:  Lab Results   Component Value Date     06/12/2019    K 3.9 06/12/2019     06/12/2019    CO2 23 06/12/2019    BUN 8 06/12/2019    CREATININE 0.58 06/12/2019    GFRAA >60 06/12/2019    LABGLOM >60 06/12/2019    GLUCOSE 103 06/12/2019    PROT 7.6 06/06/2019    LABALBU 3.6 06/06/2019    CALCIUM 9.2 06/12/2019    BILITOT 0.46 06/06/2019    ALKPHOS 63 06/06/2019    AST 19 06/06/2019    ALT 11 06/06/2019     BMP:  Lab Results   Component Value Date     06/12/2019    K 3.9 06/12/2019     06/12/2019    CO2 23 06/12/2019    BUN 8 06/12/2019    LABALBU 3.6 06/06/2019    CREATININE 0.58 06/12/2019    CALCIUM 9.2 06/12/2019    GFRAA >60 06/12/2019    LABGLOM >60 06/12/2019    GLUCOSE 103 06/12/2019     PT/INR:    Lab Results   Component Value Date    PROTIME 11.0 02/06/2017    INR 1.0 02/06/2017     PTT:    Lab Results   Component Value Date APTT 35.2 06/06/2019   [APTT}    Physical Examination:        General appearance: alert, cooperative and no distress  Mental Status: oriented to person, place and time and normal affect  Abdomen: soft, nontender, nondistended, bowel sounds present    Assessment:        Primary Problem  Pneumonia due to organism     Active Hospital Problems    Diagnosis Date Noted    Atrial fibrillation with rapid ventricular response ([facility] [address] [I48.91] 06/07/2019    Pneumonia due to organism [J18.9] 06/06/2019     Past Medical History:   Diagnosis Date    Anemia     Anxiety     Arthritis     Bell's palsy     Bright red blood per rectum     Cataracts, bilateral     DDD (degenerative disc disease)     Gastric ulcer 2014    per egd    Hepatitis 1965    Hyperlipidemia     Hypertension     Inguinal hernia     MRSA (methicillin resistant staph aureus) culture positive 02/22/2017    urine    Osteoarthritis         Plan:        1. Continue PPI  2. May d/c home  3. F/u outpatient 3-4 weeks  4. Will repeat occult blood in office, may need small bowel exam if positive for occult blood  5. Avoid NSAIDs  6.  Pt and daughter verbalized understanding of D/C instructions    Explained to the patient and d/W Nursing Staff  Will F/U with you  Please call or Page for any issues or change in status  Thanks    Electronically signed by JAMES Arrieta NP on 6/12/2019 at 12:13 PM

## 2019-06-12 NOTE — DISCHARGE INSTR - COC
Continuity of Care Form    Patient Name: Judit Medellin   :  1940  MRN:  [id number]    Admit date:  2019  Discharge date:  ***    Code Status Order: Full Code   Advance Directives:   [facility] Documentation     Date/Time Healthcare Directive Type of Healthcare Directive Copy in [facility] [address] Agent's Name Healthcare Agent's Phone Number    06/10/19 9338  No, patient does not have an advance directive for healthcare treatment -- -- -- -- --    19 1733  No, patient does not have an advance directive for healthcare treatment -- -- -- -- --          Admitting Physician:  aMngo Sierra MD  PCP: Ca Garner MD    Discharging Nurse: [facility] Unit/Room#: 2553/8628-87  Discharging Unit Phone Number: ***    Emergency Contact:   Extended Emergency Contact Information  Primary Emergency Contact: Chichi Masterson  Address: [address] Phone: [phone]  Relation: Child    Past Surgical History:  Past Surgical History:   Procedure Laterality Date    BLADDER SUSPENSION  2000    CATARACT REMOVAL Left 2009    left eye    COLONOSCOPY  9/10/14    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   DIVERTICULOSIS    COLONOSCOPY N/A 6/10/2019    COLONOSCOPY DIAGNOSTIC AND PHOTOS performed by Crystal Walker MD at [facility]      left eye lift    EYE SURGERY Left     Cataracts   [facility]      PARTIAL HYSTERECTOMY      UPPER GASTROINTESTINAL ENDOSCOPY  2014    gastric ulcer with visible vessel    UPPER GASTROINTESTINAL ENDOSCOPY  9/10/14    HEALED ULCER    UPPER GASTROINTESTINAL ENDOSCOPY N/A 6/10/2019    EGD AND PHOTOS [facility] [address]       Immunization History:   Immunization History   Administered Date(s) Administered    Influenza Vaccine, unspecified formulation 10/20/2015, 10/05/2016    Pneumococcal 13-valent Conjugate ([id number]) 10/20/2015       Active Problems:  Patient Active Problem List   Diagnosis Code    Hypertension I10    Anemia D64.9    Cataracts, bilateral H26.9    Hepatitis K75.9    Diverticulosis K57.90    Bell's palsy G51.0    Hepatomegaly R16.0    Duodenal ulcer K26.9    Gastric ulcer K25.9    Osteoarthritis M19.90    Hyperlipidemia E78.5    GI bleed K92.2    Lower GI bleed K92.2    Ileus (HCC) K56.7    Anxiety F41.9    Colitis K52.9    Food poisoning T62. 91XA    Diarrhea of presumed infectious origin R19.7    Iron deficiency anemia due to chronic blood loss D50.0    Rectal bleeding K62.5    Chronic pain syndrome G89.4    Pneumonia due to organism J18.9    Atrial fibrillation with rapid ventricular response (HCC) I48.91       Isolation/Infection:   Isolation          Contact        Patient Infection Status     Infection Encounter Level?  Onset Date Added Added By Resolved Resolved By Review Date    MRSA No  17 Jenna Hurd RN       Urine 2017          Nurse Assessment:  Last Vital Signs: /71   Pulse 96   Temp 98.1 °F (36.7 °C) (Oral)   Resp 17   Ht 5' 7\" (1.702 m)   Wt 167 lb 5.3 oz (75.9 kg)   SpO2 90%   BMI 26.21 kg/m²     Last documented pain score (0-10 scale): Pain Level: 0  Last Weight:   Wt Readings from Last 1 Encounters:   19 167 lb 5.3 oz (75.9 kg)     Mental Status:  {IP PT MENTAL STATUS:55987}    IV Access:  { ROSANNA IV ACCESS:310016218}    Nursing Mobility/ADLs:  Walking   {CHP DME VDM}  Transfer  {CHP DME LRRZ:490654451}  Bathing  {CHP DME BJHB:525180824}  Dressing  {CHP DME KPXX:455198062}  Toileting  {CHP DME NQJU:955497547}  Feeding  {CHP DME SXBU:416628744}  Med Admin  {CHP DME QVY}  Med Delivery   { ROSANNA MED Delivery:032310705}    Wound Care Documentation and Therapy:        Elimination:  Continence:   · Bowel: {YES / SD:95578}  · Bladder: {YES / JN:09787}  Urinary Catheter: {Urinary Catheter:605004514}   Colostomy/Ileostomy/Ileal Conduit: {YES / IL:19028}       Date of Last BM: ***    Intake/Output Summary (Last 24 hours) at 2019 1154  Last data filed at 2019 0705  Gross per 24 hour   Intake 455 ml   Output 2700 ml   Net -2245 ml     I/O last 3 completed shifts:   In: 65 [Blood:455]  Out: 2600 [Urine:2600]    Safety Concerns:     [address] Safety Concerns:055772267}    Impairments/Disabilities:      [address] [facility] Impairments/Disabilities:971280355}    Nutrition Therapy:  Current Nutrition Therapy:   [address] Band[address] [facility] Diet List:476690290}    Routes of Feeding: {CHP DME Other Feedings:911361667}  Liquids: {Slp liquid thickness:76407}  Daily Fluid Restriction: {CHP DME Yes amt example:499607229}  Last Modified Barium Swallow with Video (Video Swallowing Test): {Done Not Done XLBE:400674583}    Treatments at the Time of Hospital Discharge:   Respiratory Treatments: ***  Oxygen Therapy:  {Therapy; copd oxygen:37689}  Ventilator:    { CC Vent BGO}    Rehab Therapies: {THERAPEUTIC INTERVENTION:8215295771}  Weight Bearing Status/Restrictions: [facility] Weight Bearin}  Other Medical Equipment (for information only, NOT a DME order):  {EQUIPMENT:439654830}  Other Treatments: ***    Patient's personal belongings (please select all that are sent with patient):  {CHP DME Belongings:994912915}    RN SIGNATURE:  {Esignature:676815398}    CASE MANAGEMENT/SOCIAL WORK SECTION    Inpatient Status Date: ***    Readmission Risk Assessment Score:  Readmission Risk              Risk of Unplanned Readmission:        12           Discharging to Facility/ Agency   · Name:   · Address:  · Phone:  · Fax:    Dialysis Facility (if applicable)   · Name:  · Address:  · Dialysis Schedule:  · Phone:  · Fax:    / signature: {Esignature:883007832}    PHYSICIAN SECTION    Prognosis: {Prognosis:9287167218}    Condition at Discharge: [address] Patient Condition:810133559}    Rehab Potential (if transferring to Rehab): {Prognosis:2680223841}    Recommended Labs or Other Treatments After Discharge: ***    Physician Certification: I certify the above information and transfer of Kevin Lines  is necessary for the continuing treatment of the diagnosis listed and that she requires {Admit to Appropriate Level of Care:15392} for {GREATER/LESS:950195238} 30 days.      Update Admission H&P: {CHP DME Changes in [facility]:257926258}    PHYSICIAN SIGNATURE:  Electronically signed by Florence Reno MD on 6/12/19 at 11:54 AM

## 2019-06-13 ENCOUNTER — CARE COORDINATION (OUTPATIENT)
Dept: CARE COORDINATION | Age: 79
End: 2019-06-13

## 2019-06-13 ENCOUNTER — TELEPHONE (OUTPATIENT)
Dept: INTERNAL MEDICINE CLINIC | Age: 79
End: 2019-06-13

## 2019-06-13 LAB
ABO/RH: NORMAL
ANTIBODY SCREEN: NEGATIVE
ARM BAND NUMBER: NORMAL
BLD PROD TYP BPU: NORMAL
CROSSMATCH RESULT: NORMAL
DISPENSE STATUS BLOOD BANK: NORMAL
EXPIRATION DATE: NORMAL
TRANSFUSION STATUS: NORMAL
UNIT DIVISION: 0
UNIT NUMBER: NORMAL

## 2019-06-13 NOTE — CARE COORDINATION
received report from [facility], stating patient accepts their Home to stay program.

## 2019-06-18 DIAGNOSIS — I10 ESSENTIAL HYPERTENSION: ICD-10-CM

## 2019-06-18 RX ORDER — FUROSEMIDE 40 MG/1
40 TABLET ORAL DAILY
Qty: 30 TABLET | Refills: 3 | Status: SHIPPED | OUTPATIENT
Start: 2019-06-18 | End: 2019-06-18 | Stop reason: SDUPTHER

## 2019-06-18 NOTE — TELEPHONE ENCOUNTER
Medication: lasix  Last visit: 01/02/19  Next visit: Visit date not found  Last refill:   [facility]  Seen by Dr Sofi Guerra in hospital    Dr Elian isaac

## 2019-06-19 RX ORDER — FUROSEMIDE 40 MG/1
40 TABLET ORAL DAILY
Qty: 90 TABLET | Refills: 3 | Status: SHIPPED | OUTPATIENT
Start: 2019-06-19 | End: 2020-02-06 | Stop reason: SDUPTHER

## 2019-06-26 DIAGNOSIS — G89.4 CHRONIC PAIN SYNDROME: ICD-10-CM

## 2019-06-26 DIAGNOSIS — F41.9 ANXIETY: ICD-10-CM

## 2019-06-26 RX ORDER — LORAZEPAM 1 MG/1
1 TABLET ORAL EVERY 8 HOURS PRN
Qty: 120 TABLET | Refills: 0 | Status: SHIPPED | OUTPATIENT
Start: 2019-06-26 | End: 2019-07-15 | Stop reason: SDUPTHER

## 2019-06-26 RX ORDER — TRAMADOL HYDROCHLORIDE 50 MG/1
TABLET ORAL
Qty: 120 TABLET | Refills: 0 | Status: SHIPPED | OUTPATIENT
Start: 2019-06-26 | End: 2019-07-12 | Stop reason: SDUPTHER

## 2019-06-26 RX ORDER — LORAZEPAM 1 MG/1
TABLET ORAL
Qty: 120 TABLET | Refills: 0 | Status: SHIPPED | OUTPATIENT
Start: 2019-06-26 | End: 2019-07-16 | Stop reason: SDUPTHER

## 2019-06-26 NOTE — TELEPHONE ENCOUNTER
Medication Requested: Ativan    Last visit: 19  Next visit: Visit date not found  Last refill: 19    Med contract on file:  [x] yes   [] no    Last urine drug screen: UDS Order pended    Consistent with medication(s):    [] yes   [] no    Last OARRS ran: 19    Quantity of medication remainin    Who will be picking rx up: pt needs to do UDS  Pharmacy if escribed: Pended to print for UDS

## 2019-07-12 DIAGNOSIS — F41.9 ANXIETY: ICD-10-CM

## 2019-07-12 DIAGNOSIS — G89.4 CHRONIC PAIN SYNDROME: ICD-10-CM

## 2019-07-12 NOTE — TELEPHONE ENCOUNTER
This medication was refilled on 19 but the pharmacy called and states the patient never picked this up and now she needs it and its . Can you please re approve this medication?

## 2019-07-15 RX ORDER — LORAZEPAM 1 MG/1
1 TABLET ORAL EVERY 8 HOURS PRN
Qty: 120 TABLET | Refills: 0 | Status: SHIPPED | OUTPATIENT
Start: 2019-07-15 | End: 2019-08-14

## 2019-07-15 RX ORDER — TRAMADOL HYDROCHLORIDE 50 MG/1
50 TABLET ORAL 2 TIMES DAILY PRN
Qty: 60 TABLET | Refills: 1 | Status: SHIPPED | OUTPATIENT
Start: 2019-07-15 | End: 2019-08-14

## 2019-07-16 ENCOUNTER — TELEPHONE (OUTPATIENT)
Dept: INTERNAL MEDICINE CLINIC | Age: 79
End: 2019-07-16

## 2019-07-16 DIAGNOSIS — F41.9 ANXIETY: ICD-10-CM

## 2019-07-16 RX ORDER — LORAZEPAM 1 MG/1
1 TABLET ORAL EVERY 8 HOURS PRN
Qty: 90 TABLET | Refills: 0 | Status: SHIPPED | OUTPATIENT
Start: 2019-07-16 | End: 2019-08-15

## 2019-09-14 DIAGNOSIS — F41.9 ANXIETY: Primary | ICD-10-CM

## 2019-09-14 DIAGNOSIS — G89.29 CHRONIC GENERALIZED PAIN: ICD-10-CM

## 2019-09-14 DIAGNOSIS — R52 CHRONIC GENERALIZED PAIN: ICD-10-CM

## 2019-09-16 ENCOUNTER — TELEPHONE (OUTPATIENT)
Dept: INTERNAL MEDICINE CLINIC | Age: 79
End: 2019-09-16

## 2019-09-17 RX ORDER — LORAZEPAM 1 MG/1
TABLET ORAL
Qty: 90 TABLET | Refills: 0 | Status: SHIPPED | OUTPATIENT
Start: 2019-09-17 | End: 2019-10-17

## 2019-09-17 RX ORDER — TRAMADOL HYDROCHLORIDE 50 MG/1
TABLET ORAL
Qty: 60 TABLET | Refills: 0 | Status: SHIPPED | OUTPATIENT
Start: 2019-09-17 | End: 2019-10-14 | Stop reason: SDUPTHER

## 2019-10-14 DIAGNOSIS — G89.29 CHRONIC GENERALIZED PAIN: ICD-10-CM

## 2019-10-14 DIAGNOSIS — R52 CHRONIC GENERALIZED PAIN: ICD-10-CM

## 2019-10-15 RX ORDER — TRAMADOL HYDROCHLORIDE 50 MG/1
50 TABLET ORAL 2 TIMES DAILY PRN
Qty: 60 TABLET | Refills: 0 | Status: SHIPPED | OUTPATIENT
Start: 2019-10-15 | End: 2019-11-14

## 2019-10-29 DIAGNOSIS — F41.9 ANXIETY: Primary | ICD-10-CM

## 2019-10-30 RX ORDER — LORAZEPAM 1 MG/1
TABLET ORAL
Qty: 90 TABLET | Refills: 0 | Status: SHIPPED | OUTPATIENT
Start: 2019-10-30 | End: 2019-11-29

## 2019-11-15 ENCOUNTER — HOSPITAL ENCOUNTER (OUTPATIENT)
Age: 79
Setting detail: SPECIMEN
Discharge: HOME | End: 2019-11-15
Payer: MEDICARE

## 2019-11-15 DIAGNOSIS — Z51.81 MEDICATION MONITORING ENCOUNTER: ICD-10-CM

## 2019-11-15 DIAGNOSIS — Z51.81 MEDICATION MONITORING ENCOUNTER: Primary | ICD-10-CM

## 2019-11-15 RX ORDER — TRAMADOL HYDROCHLORIDE 50 MG/1
TABLET ORAL
Qty: 60 TABLET | Refills: 0 | Status: SHIPPED | OUTPATIENT
Start: 2019-11-15 | End: 2019-12-14 | Stop reason: SDUPTHER

## 2019-11-20 LAB
6-ACETYLMORPHINE, UR: NOT DETECTED
7-AMINOCLONAZEPAM, URINE: NOT DETECTED
ALPHA-OH-ALPRAZ, URINE: NOT DETECTED
ALPRAZOLAM, URINE: NOT DETECTED
AMPHETAMINES, URINE: NOT DETECTED
BARBITURATES, URINE: NOT DETECTED
BENZOYLECGONINE, UR: NOT DETECTED
BUPRENORPHINE URINE: NOT DETECTED
CARISOPRODOL, UR: NOT DETECTED
CLONAZEPAM, URINE: NOT DETECTED
CODEINE, URINE: NOT DETECTED
CREATININE URINE: <20 MG/DL (ref 20–400)
DIAZEPAM, URINE: NOT DETECTED
DRUGS EXPECTED, UR: ABNORMAL
EER HI RES INTERP UR: ABNORMAL
ETHYL GLUCURONIDE UR: NOT DETECTED
FENTANYL URINE: NOT DETECTED
HYDROCODONE, URINE: NOT DETECTED
HYDROMORPHONE, URINE: NOT DETECTED
LORAZEPAM, URINE: PRESENT
MARIJUANA METAB, UR: NOT DETECTED
MDA, UR: NOT DETECTED
MDEA, EVE, UR: NOT DETECTED
MDMA URINE: NOT DETECTED
MEPERIDINE METAB, UR: NOT DETECTED
METHADONE, URINE: NOT DETECTED
METHAMPHETAMINE, URINE: NOT DETECTED
METHYLPHENIDATE: NOT DETECTED
MIDAZOLAM, URINE: NOT DETECTED
MORPHINE URINE: NOT DETECTED
NORBUPRENORPHINE, URINE: NOT DETECTED
NORDIAZEPAM, URINE: NOT DETECTED
NORFENTANYL, URINE: NOT DETECTED
NORHYDROCODONE, URINE: NOT DETECTED
NOROXYCODONE, URINE: NOT DETECTED
NOROXYMORPHONE, URINE: NOT DETECTED
OXAZEPAM, URINE: NOT DETECTED
OXYCODONE URINE: NOT DETECTED
OXYMORPHONE, URINE: NOT DETECTED
PAIN MANAGEMENT DRUG PANEL INTERP, URINE: ABNORMAL
PAIN MGT DRUG PANEL, HI RES, UR: ABNORMAL
PCP,URINE: NOT DETECTED
PHENTERMINE, UR: NOT DETECTED
PROPOXYPHENE, URINE: NOT DETECTED
TAPENTADOL, URINE: NOT DETECTED
TAPENTADOL-O-SULFATE, URINE: NOT DETECTED
TEMAZEPAM, URINE: NOT DETECTED
TRAMADOL, URINE: PRESENT
ZOLPIDEM, URINE: NOT DETECTED

## 2019-12-09 DIAGNOSIS — F41.9 ANXIETY: Primary | ICD-10-CM

## 2019-12-10 RX ORDER — LORAZEPAM 1 MG/1
TABLET ORAL
Qty: 90 TABLET | Refills: 0 | Status: SHIPPED | OUTPATIENT
Start: 2019-12-10 | End: 2020-01-15

## 2019-12-14 DIAGNOSIS — R52 CHRONIC GENERALIZED PAIN: Primary | ICD-10-CM

## 2019-12-14 DIAGNOSIS — Z51.81 MEDICATION MONITORING ENCOUNTER: ICD-10-CM

## 2019-12-14 DIAGNOSIS — G89.29 CHRONIC GENERALIZED PAIN: Primary | ICD-10-CM

## 2019-12-16 RX ORDER — TRAMADOL HYDROCHLORIDE 50 MG/1
TABLET ORAL
Qty: 60 TABLET | Refills: 0 | Status: SHIPPED | OUTPATIENT
Start: 2019-12-16 | End: 2020-01-14

## 2019-12-27 ENCOUNTER — HOSPITAL ENCOUNTER (OUTPATIENT)
Age: 79
Setting detail: SPECIMEN
Discharge: HOME | End: 2019-12-27
Payer: MEDICARE

## 2019-12-27 ENCOUNTER — OFFICE VISIT (OUTPATIENT)
Dept: INTERNAL MEDICINE CLINIC | Age: 79
End: 2019-12-27
Payer: MEDICARE

## 2019-12-27 VITALS
OXYGEN SATURATION: 97 % | SYSTOLIC BLOOD PRESSURE: 128 MMHG | BODY MASS INDEX: 26.37 KG/M2 | HEIGHT: 67 IN | DIASTOLIC BLOOD PRESSURE: 72 MMHG | WEIGHT: 168 LBS | HEART RATE: 81 BPM

## 2019-12-27 DIAGNOSIS — N39.0 URINARY TRACT INFECTION WITHOUT HEMATURIA, SITE UNSPECIFIED: ICD-10-CM

## 2019-12-27 LAB
-: NORMAL
AMORPHOUS: NORMAL
BACTERIA: NORMAL
BILIRUBIN URINE: NEGATIVE
CASTS UA: NORMAL /LPF (ref 0–8)
COLOR: YELLOW
COMMENT UA: ABNORMAL
CRYSTALS, UA: NORMAL /HPF
EPITHELIAL CELLS UA: NORMAL /HPF (ref 0–5)
GLUCOSE URINE: NEGATIVE
KETONES, URINE: NEGATIVE
LEUKOCYTE ESTERASE, URINE: ABNORMAL
MUCUS: NORMAL
NITRITE, URINE: NEGATIVE
OTHER OBSERVATIONS UA: NORMAL
PH UA: 7 (ref 5–8)
PROTEIN UA: NEGATIVE
RBC UA: NORMAL /HPF (ref 0–4)
RENAL EPITHELIAL, UA: NORMAL /HPF
SPECIFIC GRAVITY UA: 1.01 (ref 1–1.03)
TRICHOMONAS: NORMAL
TURBIDITY: CLEAR
URINE HGB: NEGATIVE
UROBILINOGEN, URINE: NORMAL
WBC UA: NORMAL /HPF (ref 0–5)
YEAST: NORMAL

## 2019-12-27 PROCEDURE — 1036F TOBACCO NON-USER: CPT | Performed by: INTERNAL MEDICINE

## 2019-12-27 PROCEDURE — 1090F PRES/ABSN URINE INCON ASSESS: CPT | Performed by: INTERNAL MEDICINE

## 2019-12-27 PROCEDURE — 99214 OFFICE O/P EST MOD 30 MIN: CPT | Performed by: INTERNAL MEDICINE

## 2019-12-27 PROCEDURE — 1123F ACP DISCUSS/DSCN MKR DOCD: CPT | Performed by: INTERNAL MEDICINE

## 2019-12-27 PROCEDURE — G8400 PT W/DXA NO RESULTS DOC: HCPCS | Performed by: INTERNAL MEDICINE

## 2019-12-27 PROCEDURE — G8427 DOCREV CUR MEDS BY ELIG CLIN: HCPCS | Performed by: INTERNAL MEDICINE

## 2019-12-27 PROCEDURE — G8484 FLU IMMUNIZE NO ADMIN: HCPCS | Performed by: INTERNAL MEDICINE

## 2019-12-27 PROCEDURE — G8417 CALC BMI ABV UP PARAM F/U: HCPCS | Performed by: INTERNAL MEDICINE

## 2019-12-27 PROCEDURE — 4040F PNEUMOC VAC/ADMIN/RCVD: CPT | Performed by: INTERNAL MEDICINE

## 2019-12-27 RX ORDER — LISINOPRIL 10 MG/1
10 TABLET ORAL DAILY
Qty: 90 TABLET | Refills: 3 | Status: SHIPPED | OUTPATIENT
Start: 2019-12-27 | End: 2020-08-17 | Stop reason: SDUPTHER

## 2019-12-27 RX ORDER — FUROSEMIDE 20 MG/1
20 TABLET ORAL DAILY
Qty: 60 TABLET | Refills: 3 | Status: SHIPPED | OUTPATIENT
Start: 2019-12-27 | End: 2020-04-14 | Stop reason: SDUPTHER

## 2019-12-27 NOTE — PROGRESS NOTES
Breath sounds: Normal breath sounds. No stridor. No wheezing or rales. Chest:      Chest wall: No tenderness. Abdominal:      General: Bowel sounds are normal. There is no distension. Palpations: Abdomen is soft. Tenderness: There is no tenderness. There is no guarding or rebound. Musculoskeletal: Normal range of motion. Right lower leg: Edema present. Left lower leg: Edema present. Neurological:      Mental Status: She is alert and oriented to person, place, and time. Assessment / Plan:   1. Post-menopausa  - DEXA Bone Density Axial Skeleton; Future    2. Essential hypertension  Controlled  - lisinopril (PRINIVIL;ZESTRIL) 10 MG tablet; Take 1 tablet by mouth daily  Dispense: 90 tablet; Refill: 3    3. Pulmonary HTN ([address]  Starting patient on Lasix  Low-salt diet  Advised to keep leg elevated  - furosemide (LASIX) 20 MG tablet; Take 1 tablet by mouth daily  Dispense: 60 tablet; Refill: 3  - Compression Stocking    4. Urinary tract infection without hematuria, site unspecified  - Urinalysis Reflex to Culture; Future      · Return in about 3 months (around 3/27/2020). · Reviewed prior labs and health maintenance. · Discussed use, benefit, and side effects of prescribed medications. Barriers to medication compliance addressed. All patient questions answered. Pt voiced understanding. Alicia Connor MD  [facility]  1/7/2020, 2:56 PM    Please note that this chart was generated using voice recognition Dragon dictation software. Although every effort was made to ensure the accuracy of this automated transcription, some errors in transcription may have occurred. 25 yo female presents with pimple right nostril x 2-3 days, states she tried to pop the pimple last night today she woke and right cheek was swollen, denies fever.  took motrin earlier today for pain.  PMD Dr Palmer

## 2019-12-28 LAB
CULTURE: NORMAL
Lab: NORMAL
SPECIMEN DESCRIPTION: NORMAL

## 2020-01-07 ASSESSMENT — ENCOUNTER SYMPTOMS
CHEST TIGHTNESS: 0
EYE ITCHING: 0
EYE DISCHARGE: 0
CHOKING: 0
COLOR CHANGE: 0
CONSTIPATION: 0
ABDOMINAL DISTENTION: 0
BLOOD IN STOOL: 0
EYE PAIN: 0
APNEA: 0
DIARRHEA: 0
ABDOMINAL PAIN: 0
SHORTNESS OF BREATH: 0
EYE REDNESS: 0
BACK PAIN: 0
COUGH: 0

## 2020-01-14 RX ORDER — TRAMADOL HYDROCHLORIDE 50 MG/1
TABLET ORAL
Qty: 60 TABLET | Refills: 0 | Status: SHIPPED | OUTPATIENT
Start: 2020-01-14 | End: 2020-02-10

## 2020-01-15 RX ORDER — LORAZEPAM 1 MG/1
TABLET ORAL
Qty: 90 TABLET | Refills: 0 | Status: SHIPPED | OUTPATIENT
Start: 2020-01-15 | End: 2020-02-10

## 2020-02-06 RX ORDER — FUROSEMIDE 40 MG/1
40 TABLET ORAL DAILY
Qty: 90 TABLET | Refills: 3 | Status: SHIPPED | OUTPATIENT
Start: 2020-02-06 | End: 2020-04-14 | Stop reason: SDUPTHER

## 2020-02-10 RX ORDER — TRAMADOL HYDROCHLORIDE 50 MG/1
TABLET ORAL
Qty: 60 TABLET | Refills: 0 | Status: SHIPPED | OUTPATIENT
Start: 2020-02-10 | End: 2020-03-11

## 2020-02-10 RX ORDER — LORAZEPAM 1 MG/1
TABLET ORAL
Qty: 90 TABLET | Refills: 0 | Status: SHIPPED | OUTPATIENT
Start: 2020-02-10 | End: 2020-03-11

## 2020-03-14 ENCOUNTER — TELEPHONE (OUTPATIENT)
Dept: INTERNAL MEDICINE CLINIC | Age: 80
End: 2020-03-14

## 2020-03-14 DIAGNOSIS — F41.9 ANXIETY: Primary | ICD-10-CM

## 2020-03-14 DIAGNOSIS — R52 CHRONIC GENERALIZED PAIN: ICD-10-CM

## 2020-03-14 DIAGNOSIS — G89.29 CHRONIC GENERALIZED PAIN: ICD-10-CM

## 2020-03-16 RX ORDER — LORAZEPAM 1 MG/1
TABLET ORAL
Qty: 90 TABLET | Refills: 0 | Status: SHIPPED | OUTPATIENT
Start: 2020-03-16 | End: 2020-03-18 | Stop reason: SDUPTHER

## 2020-03-16 RX ORDER — TRAMADOL HYDROCHLORIDE 50 MG/1
TABLET ORAL
Qty: 60 TABLET | Refills: 0 | Status: SHIPPED | OUTPATIENT
Start: 2020-03-16 | End: 2020-03-18 | Stop reason: SDUPTHER

## 2020-03-18 RX ORDER — TRAMADOL HYDROCHLORIDE 50 MG/1
50 TABLET ORAL EVERY 8 HOURS PRN
Qty: 60 TABLET | Refills: 0 | Status: SHIPPED | OUTPATIENT
Start: 2020-03-18 | End: 2020-04-14 | Stop reason: SDUPTHER

## 2020-03-18 RX ORDER — LORAZEPAM 1 MG/1
1 TABLET ORAL EVERY 8 HOURS PRN
Qty: 90 TABLET | Refills: 0 | Status: SHIPPED | OUTPATIENT
Start: 2020-03-18 | End: 2020-04-14 | Stop reason: SDUPTHER

## 2020-03-27 RX ORDER — OMEPRAZOLE 20 MG/1
CAPSULE, DELAYED RELEASE ORAL
Qty: 90 CAPSULE | Refills: 3 | Status: SHIPPED | OUTPATIENT
Start: 2020-03-27 | End: 2020-08-17 | Stop reason: SDUPTHER

## 2020-04-14 RX ORDER — TRAMADOL HYDROCHLORIDE 50 MG/1
50 TABLET ORAL EVERY 8 HOURS PRN
Qty: 60 TABLET | Refills: 0 | Status: SHIPPED | OUTPATIENT
Start: 2020-04-14 | End: 2020-06-10

## 2020-04-14 RX ORDER — LORAZEPAM 1 MG/1
1 TABLET ORAL EVERY 8 HOURS PRN
Qty: 90 TABLET | Refills: 0 | Status: SHIPPED | OUTPATIENT
Start: 2020-04-14 | End: 2020-06-10

## 2020-04-14 RX ORDER — FUROSEMIDE 40 MG/1
40 TABLET ORAL DAILY
Qty: 90 TABLET | Refills: 3 | Status: SHIPPED | OUTPATIENT
Start: 2020-04-14 | End: 2020-08-17

## 2020-04-14 RX ORDER — FUROSEMIDE 20 MG/1
20 TABLET ORAL DAILY
Qty: 60 TABLET | Refills: 3 | Status: SHIPPED | OUTPATIENT
Start: 2020-04-14 | End: 2020-08-17 | Stop reason: SDUPTHER

## 2020-04-21 ENCOUNTER — TELEPHONE (OUTPATIENT)
Dept: INTERNAL MEDICINE CLINIC | Age: 80
End: 2020-04-21

## 2020-06-10 RX ORDER — TRAMADOL HYDROCHLORIDE 50 MG/1
TABLET ORAL
Qty: 60 TABLET | Refills: 0 | Status: SHIPPED | OUTPATIENT
Start: 2020-06-10 | End: 2020-07-10

## 2020-06-10 RX ORDER — LORAZEPAM 1 MG/1
TABLET ORAL
Qty: 90 TABLET | Refills: 0 | Status: SHIPPED | OUTPATIENT
Start: 2020-06-10 | End: 2020-07-10

## 2020-07-10 RX ORDER — LORAZEPAM 1 MG/1
TABLET ORAL
Qty: 90 TABLET | Refills: 0 | Status: SHIPPED | OUTPATIENT
Start: 2020-07-10 | End: 2020-08-17 | Stop reason: SDUPTHER

## 2020-07-10 RX ORDER — TRAMADOL HYDROCHLORIDE 50 MG/1
TABLET ORAL
Qty: 60 TABLET | Refills: 0 | Status: SHIPPED | OUTPATIENT
Start: 2020-07-10 | End: 2020-08-17 | Stop reason: SDUPTHER

## 2020-07-10 NOTE — TELEPHONE ENCOUNTER
Medication Requested: Ativan, Tramadol     Last visit: 12/27/19  Next visit: Visit date not found  Last refill: 06/10/20    Med contract on file:  [x] yes   [] no    Last urine drug screen: 11/15/19  Consistent with medication(s):    [x] yes   [] no    Last OARRS ran:     Quantity of medication remaining:     Who will be picking rx up:     Pharmacy if escribed:

## 2020-08-17 ENCOUNTER — OFFICE VISIT (OUTPATIENT)
Dept: INTERNAL MEDICINE CLINIC | Age: 80
End: 2020-08-17
Payer: MEDICARE

## 2020-08-17 ENCOUNTER — HOSPITAL ENCOUNTER (OUTPATIENT)
Age: 80
Setting detail: SPECIMEN
Discharge: HOME | End: 2020-08-17
Payer: MEDICARE

## 2020-08-17 VITALS
BODY MASS INDEX: 27.15 KG/M2 | HEIGHT: 67 IN | TEMPERATURE: 97.3 F | WEIGHT: 173 LBS | DIASTOLIC BLOOD PRESSURE: 64 MMHG | SYSTOLIC BLOOD PRESSURE: 122 MMHG

## 2020-08-17 PROBLEM — I48.21 PERMANENT ATRIAL FIBRILLATION (HCC): Status: ACTIVE | Noted: 2020-08-17

## 2020-08-17 LAB
-: ABNORMAL
ABSOLUTE EOS #: 0.08 K/UL (ref 0–0.44)
ABSOLUTE IMMATURE GRANULOCYTE: 0.03 K/UL (ref 0–0.3)
ABSOLUTE LYMPH #: 2.25 K/UL (ref 1.1–3.7)
ABSOLUTE MONO #: 0.62 K/UL (ref 0.1–1.2)
ALBUMIN SERPL-MCNC: 3.7 G/DL (ref 3.5–5.2)
ALBUMIN/GLOBULIN RATIO: 1 (ref 1–2.5)
ALP BLD-CCNC: 71 U/L (ref 35–104)
ALT SERPL-CCNC: 13 U/L (ref 5–33)
AMORPHOUS: ABNORMAL
ANION GAP SERPL CALCULATED.3IONS-SCNC: 14 MMOL/L (ref 9–17)
AST SERPL-CCNC: 15 U/L
BACTERIA: ABNORMAL
BASOPHILS # BLD: 1 % (ref 0–2)
BASOPHILS ABSOLUTE: 0.06 K/UL (ref 0–0.2)
BILIRUB SERPL-MCNC: 0.48 MG/DL (ref 0.3–1.2)
BILIRUBIN URINE: NEGATIVE
BUN BLDV-MCNC: 22 MG/DL (ref 8–23)
BUN/CREAT BLD: NORMAL (ref 9–20)
C-REACTIVE PROTEIN: 4.1 MG/L (ref 0–5)
CALCIUM SERPL-MCNC: 9.7 MG/DL (ref 8.6–10.4)
CASTS UA: ABNORMAL /LPF (ref 0–2)
CASTS UA: ABNORMAL /LPF (ref 0–2)
CHLORIDE BLD-SCNC: 102 MMOL/L (ref 98–107)
CO2: 23 MMOL/L (ref 20–31)
COLOR: YELLOW
COMMENT UA: ABNORMAL
CREAT SERPL-MCNC: 0.72 MG/DL (ref 0.5–0.9)
CRYSTALS, UA: ABNORMAL /HPF
DIFFERENTIAL TYPE: ABNORMAL
EOSINOPHILS RELATIVE PERCENT: 1 % (ref 1–4)
EPITHELIAL CELLS UA: ABNORMAL /HPF (ref 0–5)
GFR AFRICAN AMERICAN: >60 ML/MIN
GFR NON-AFRICAN AMERICAN: >60 ML/MIN
GFR SERPL CREATININE-BSD FRML MDRD: NORMAL ML/MIN/{1.73_M2}
GFR SERPL CREATININE-BSD FRML MDRD: NORMAL ML/MIN/{1.73_M2}
GLUCOSE BLD-MCNC: 95 MG/DL (ref 70–99)
GLUCOSE URINE: NEGATIVE
HCT VFR BLD CALC: 37.8 % (ref 36.3–47.1)
HEMOGLOBIN: 10.8 G/DL (ref 11.9–15.1)
IMMATURE GRANULOCYTES: 1 %
KETONES, URINE: NEGATIVE
LEUKOCYTE ESTERASE, URINE: ABNORMAL
LYMPHOCYTES # BLD: 35 % (ref 24–43)
MCH RBC QN AUTO: 22.6 PG (ref 25.2–33.5)
MCHC RBC AUTO-ENTMCNC: 28.6 G/DL (ref 28.4–34.8)
MCV RBC AUTO: 79.1 FL (ref 82.6–102.9)
MONOCYTES # BLD: 10 % (ref 3–12)
MUCUS: ABNORMAL
NITRITE, URINE: NEGATIVE
NRBC AUTOMATED: 0 PER 100 WBC
OTHER OBSERVATIONS UA: ABNORMAL
PDW BLD-RTO: 19.9 % (ref 11.8–14.4)
PH UA: 6.5 (ref 5–8)
PLATELET # BLD: 324 K/UL (ref 138–453)
PLATELET ESTIMATE: ABNORMAL
PMV BLD AUTO: 9.6 FL (ref 8.1–13.5)
POTASSIUM SERPL-SCNC: 4.5 MMOL/L (ref 3.7–5.3)
PROTEIN UA: ABNORMAL
RBC # BLD: 4.78 M/UL (ref 3.95–5.11)
RBC # BLD: ABNORMAL 10*6/UL
RBC UA: ABNORMAL /HPF (ref 0–2)
RENAL EPITHELIAL, UA: ABNORMAL /HPF
SEG NEUTROPHILS: 52 % (ref 36–65)
SEGMENTED NEUTROPHILS ABSOLUTE COUNT: 3.37 K/UL (ref 1.5–8.1)
SODIUM BLD-SCNC: 139 MMOL/L (ref 135–144)
SPECIFIC GRAVITY UA: 1.01 (ref 1–1.03)
TOTAL PROTEIN: 7.3 G/DL (ref 6.4–8.3)
TRICHOMONAS: ABNORMAL
TSH SERPL DL<=0.05 MIU/L-ACNC: 2.4 MIU/L (ref 0.3–5)
TURBIDITY: ABNORMAL
URINE HGB: ABNORMAL
UROBILINOGEN, URINE: NORMAL
WBC # BLD: 6.4 K/UL (ref 3.5–11.3)
WBC # BLD: ABNORMAL 10*3/UL
WBC UA: ABNORMAL /HPF (ref 0–5)
YEAST: ABNORMAL

## 2020-08-17 PROCEDURE — 99214 OFFICE O/P EST MOD 30 MIN: CPT | Performed by: INTERNAL MEDICINE

## 2020-08-17 PROCEDURE — 1036F TOBACCO NON-USER: CPT | Performed by: INTERNAL MEDICINE

## 2020-08-17 PROCEDURE — G8417 CALC BMI ABV UP PARAM F/U: HCPCS | Performed by: INTERNAL MEDICINE

## 2020-08-17 PROCEDURE — 4040F PNEUMOC VAC/ADMIN/RCVD: CPT | Performed by: INTERNAL MEDICINE

## 2020-08-17 PROCEDURE — G8400 PT W/DXA NO RESULTS DOC: HCPCS | Performed by: INTERNAL MEDICINE

## 2020-08-17 PROCEDURE — G8427 DOCREV CUR MEDS BY ELIG CLIN: HCPCS | Performed by: INTERNAL MEDICINE

## 2020-08-17 PROCEDURE — 1123F ACP DISCUSS/DSCN MKR DOCD: CPT | Performed by: INTERNAL MEDICINE

## 2020-08-17 PROCEDURE — 1090F PRES/ABSN URINE INCON ASSESS: CPT | Performed by: INTERNAL MEDICINE

## 2020-08-17 RX ORDER — LISINOPRIL 10 MG/1
10 TABLET ORAL DAILY
Qty: 90 TABLET | Refills: 3 | Status: SHIPPED | OUTPATIENT
Start: 2020-08-17 | End: 2021-02-15

## 2020-08-17 RX ORDER — TRAMADOL HYDROCHLORIDE 50 MG/1
50 TABLET ORAL EVERY 6 HOURS PRN
Qty: 60 TABLET | Refills: 3 | Status: SHIPPED | OUTPATIENT
Start: 2020-08-17 | End: 2020-09-21 | Stop reason: SDUPTHER

## 2020-08-17 RX ORDER — LORAZEPAM 1 MG/1
1 TABLET ORAL EVERY 8 HOURS PRN
Qty: 90 TABLET | Refills: 3 | Status: SHIPPED | OUTPATIENT
Start: 2020-08-17 | End: 2020-09-21 | Stop reason: SDUPTHER

## 2020-08-17 RX ORDER — FUROSEMIDE 20 MG/1
20 TABLET ORAL DAILY
Qty: 90 TABLET | Refills: 3 | Status: SHIPPED | OUTPATIENT
Start: 2020-08-17 | End: 2020-09-21 | Stop reason: SDUPTHER

## 2020-08-17 RX ORDER — OMEPRAZOLE 20 MG/1
20 CAPSULE, DELAYED RELEASE ORAL DAILY
Qty: 90 CAPSULE | Refills: 3 | Status: SHIPPED | OUTPATIENT
Start: 2020-08-17 | End: 2021-02-19 | Stop reason: SDUPTHER

## 2020-08-17 RX ORDER — CIPROFLOXACIN 500 MG/1
500 TABLET, FILM COATED ORAL 2 TIMES DAILY
Qty: 20 TABLET | Refills: 0 | Status: SHIPPED | OUTPATIENT
Start: 2020-08-17 | End: 2021-01-03 | Stop reason: ALTCHOICE

## 2020-08-17 ASSESSMENT — ENCOUNTER SYMPTOMS
SHORTNESS OF BREATH: 0
ALLERGIC/IMMUNOLOGIC NEGATIVE: 1
GASTROINTESTINAL NEGATIVE: 1
ORTHOPNEA: 0
BLURRED VISION: 0

## 2020-08-17 ASSESSMENT — PATIENT HEALTH QUESTIONNAIRE - PHQ9
SUM OF ALL RESPONSES TO PHQ9 QUESTIONS 1 & 2: 0
2. FEELING DOWN, DEPRESSED OR HOPELESS: 0
1. LITTLE INTEREST OR PLEASURE IN DOING THINGS: 0
SUM OF ALL RESPONSES TO PHQ QUESTIONS 1-9: 0
SUM OF ALL RESPONSES TO PHQ QUESTIONS 1-9: 0

## 2020-08-17 NOTE — PROGRESS NOTES
Subjective:      Patient ID: Jaime Pollack is a 68 y.o. female. Visit Information    Have you changed or started any medications since your last visit including any over-the-counter medicines, vitamins, or herbal medicines? no   Are you having any side effects from any of your medications? -  no  Have you stopped taking any of your medications? Is so, why? -  no    Have you seen any other physician or provider since your last visit? No  Have you had any other diagnostic tests since your last visit? No  Have you been seen in the emergency room and/or had an admission to a hospital since we last saw you? No  Have you had your routine dental cleaning in the past 6 months? no    Have you activated your [employer] account? If not, what are your barriers? Yes     Patient Care Team:  Jayce Murry MD as PCP - Vini Rojo MD as PCP - [facility] [place] [place] [facility]  Tawny Dancer, MD as Consulting Physician (Gastroenterology)    Medical History Review  Past Medical, Family, and Social History reviewed and does not contribute to the patient presenting condition    Health Maintenance   Topic Date Due    DTaP/Tdap/Td vaccine (1 - Tdap) 07/16/1961    Shingles Vaccine (1 of 2) 07/16/1992    DEXA (modify frequency per FRAX score)  07/16/1997    Annual Wellness Visit (AWV)  05/29/2019    Potassium monitoring  06/12/2020    Creatinine monitoring  06/12/2020    Flu vaccine (1) 09/01/2020    Pneumococcal 65+ years Vaccine  Completed    Hepatitis A vaccine  Aged Out    Hepatitis B vaccine  Aged Out    Hib vaccine  Aged Out    Meningococcal (ACWY) vaccine  Aged Out       Hypertension   This is a chronic problem. The current episode started more than 1 year ago. The problem is unchanged. Associated symptoms include anxiety. Pertinent negatives include no blurred vision, chest pain, headaches, malaise/fatigue, neck pain, orthopnea, palpitations, peripheral edema, PND, shortness of breath or sweats.  There are no associated agents to hypertension. Risk factors for coronary artery disease include dyslipidemia. There are no compliance problems. Hypertensive end-organ damage includes left ventricular hypertrophy. There is no history of chronic renal disease or a hypertension causing med. Review of Systems   Constitutional: Positive for fatigue. Negative for malaise/fatigue. HENT: Negative. Eyes: Negative for blurred vision. Respiratory: Negative for shortness of breath. Cardiovascular: Negative. Negative for chest pain, palpitations, orthopnea and PND. Gastrointestinal: Negative. Endocrine: Negative. Musculoskeletal: Negative. Negative for neck pain. Skin: Negative. Allergic/Immunologic: Negative. Neurological: Negative. Negative for headaches. Hematological: Negative. Psychiatric/Behavioral: The patient is nervous/anxious. Objective:   Physical Exam  Constitutional:       Appearance: Normal appearance. HENT:      Head: Normocephalic and atraumatic. Nose: No congestion or rhinorrhea. Neck:      Musculoskeletal: Normal range of motion. No neck rigidity or muscular tenderness. Cardiovascular:      Rate and Rhythm: Rhythm irregular. Heart sounds: No murmur. No gallop. Comments: Apical HR 84/ minute   Musculoskeletal:      Right lower leg: Edema present. Comments: Ankle edema    Skin:     General: Skin is warm and dry. Neurological:      Mental Status: She is alert. Assessment / Plan:       Diagnosis Orders   1. Atrial fibrillation with rapid ventricular response (HCC) = well controlled     2. Pulmonary HTN (HCC) = no change  furosemide (LASIX) 20 MG tablet   3. Essential hypertension  Well controlled  lisinopril (PRINIVIL;ZESTRIL) 10 MG tablet   4. Anxiety = on  Ativan  LORazepam (ATIVAN) 1 MG tablet   5. Chronic generalized pain = tramadol  traMADol (ULTRAM) 50 MG tablet   6.  Permanent atrial fibrillation = controlled = he face is back to normal except eye twitchings     7. Bell's palsy = see above     8. Diverticulosis = no symptoms       Return for Follow Up, hypertension. Orders Placed This Encounter   Procedures    CBC Auto Differential     Standing Status:   Future     Standing Expiration Date:   8/17/2021    Comprehensive Metabolic Panel     Standing Status:   Future     Standing Expiration Date:   8/17/2021    TSH without Reflex     Standing Status:   Future     Standing Expiration Date:   8/17/2021    C-Reactive Protein     Standing Status:   Future     Standing Expiration Date:   8/17/2021    Urinalysis Reflex to Culture     Standing Status:   Future     Standing Expiration Date:   8/17/2021     Order Specific Question:   SPECIFY(EX-CATH,MIDSTREAM,CYSTO,ETC)? Answer:   midstream     Orders Placed This Encounter   Medications    furosemide (LASIX) 20 MG tablet     Sig: Take 1 tablet by mouth daily     Dispense:  90 tablet     Refill:  3    omeprazole (PRILOSEC) 20 MG delayed release capsule     Sig: Take 1 capsule by mouth Daily     Dispense:  90 capsule     Refill:  3    lisinopril (PRINIVIL;ZESTRIL) 10 MG tablet     Sig: Take 1 tablet by mouth daily     Dispense:  90 tablet     Refill:  3    metoprolol tartrate (LOPRESSOR) 25 MG tablet     Sig: Take 1 tablet by mouth 2 times daily     Dispense:  180 tablet     Refill:  3     **Patient requests 90 days supply**    LORazepam (ATIVAN) 1 MG tablet     Sig: Take 1 tablet by mouth every 8 hours as needed for Anxiety for up to 30 days. Dispense:  90 tablet     Refill:  3    traMADol (ULTRAM) 50 MG tablet     Sig: Take 1 tablet by mouth every 6 hours as needed for Pain for up to 30 days.      Dispense:  60 tablet     Refill:  3     Reduce doses taken as pain becomes manageable      Will rule out UTI  Time more than 40 minutes

## 2020-08-18 LAB
CULTURE: ABNORMAL
Lab: ABNORMAL
SPECIMEN DESCRIPTION: ABNORMAL

## 2020-09-18 NOTE — TELEPHONE ENCOUNTER
Medication Requested: tramadol  ativan    Last visit: 20  Next visit: 2020  Last refill: 20    Med contract on file:  [x] yes   [] no    Last urine drug screen: 11/15/19  Consistent with medication(s):    [x] yes   [] no    Last OARRS ran: unk    Quantity of medication remainin days    Who will be picking rx up: [address] if escribed: [facility]

## 2020-09-21 RX ORDER — FUROSEMIDE 20 MG/1
20 TABLET ORAL DAILY
Qty: 90 TABLET | Refills: 3 | Status: SHIPPED | OUTPATIENT
Start: 2020-09-21 | End: 2021-08-10

## 2020-09-21 RX ORDER — TRAMADOL HYDROCHLORIDE 50 MG/1
50 TABLET ORAL EVERY 6 HOURS PRN
Qty: 60 TABLET | Refills: 3 | Status: SHIPPED | OUTPATIENT
Start: 2020-09-21 | End: 2020-10-21 | Stop reason: SDUPTHER

## 2020-09-21 RX ORDER — LORAZEPAM 1 MG/1
1 TABLET ORAL EVERY 8 HOURS PRN
Qty: 90 TABLET | Refills: 3 | Status: SHIPPED | OUTPATIENT
Start: 2020-09-21 | End: 2020-10-21 | Stop reason: SDUPTHER

## 2020-10-21 RX ORDER — TRAMADOL HYDROCHLORIDE 50 MG/1
50 TABLET ORAL EVERY 6 HOURS PRN
Qty: 60 TABLET | Refills: 3 | Status: SHIPPED | OUTPATIENT
Start: 2020-10-21 | End: 2020-11-17 | Stop reason: SDUPTHER

## 2020-10-21 RX ORDER — LORAZEPAM 1 MG/1
1 TABLET ORAL EVERY 8 HOURS PRN
Qty: 90 TABLET | Refills: 3 | Status: SHIPPED | OUTPATIENT
Start: 2020-10-21 | End: 2020-11-20

## 2020-10-21 NOTE — TELEPHONE ENCOUNTER
Medication Requested: alprazolam  tramadol    Last visit: 20  Next visit: 2020  Last refill: 20    Med contract on file:  [x] yes   [] no    Last urine drug screen: 11/15/19  Consistent with medication(s):    [] yes   [x] no    Last OARRS ran: unk    Quantity of medication remainin pills    Who will be picking rx up:     Pharmacy if escribed: [facility]

## 2020-11-12 ENCOUNTER — TELEPHONE (OUTPATIENT)
Dept: INTERNAL MEDICINE CLINIC | Age: 80
End: 2020-11-12

## 2020-11-16 ENCOUNTER — TELEPHONE (OUTPATIENT)
Dept: INTERNAL MEDICINE CLINIC | Age: 80
End: 2020-11-16

## 2020-11-17 RX ORDER — TRAMADOL HYDROCHLORIDE 50 MG/1
50 TABLET ORAL EVERY 6 HOURS PRN
Qty: 60 TABLET | Refills: 3 | Status: SHIPPED | OUTPATIENT
Start: 2020-11-17 | End: 2020-12-15 | Stop reason: SDUPTHER

## 2020-11-17 NOTE — TELEPHONE ENCOUNTER
Medication: Tramadol 50 mg   Last visit: 08/17/2020  Next visit: 12/9/2020  Last refill: 10/21/2020  Pharmacy: [facility]

## 2020-12-14 ENCOUNTER — VIRTUAL VISIT (OUTPATIENT)
Dept: INTERNAL MEDICINE CLINIC | Age: 80
End: 2020-12-14
Payer: MEDICARE

## 2020-12-14 PROBLEM — N39.0 RECURRENT UTI: Status: ACTIVE | Noted: 2020-12-14

## 2020-12-14 PROCEDURE — G0439 PPPS, SUBSEQ VISIT: HCPCS | Performed by: INTERNAL MEDICINE

## 2020-12-14 RX ORDER — CIPROFLOXACIN 500 MG/1
500 TABLET, FILM COATED ORAL 2 TIMES DAILY
Qty: 20 TABLET | Refills: 0 | Status: SHIPPED | OUTPATIENT
Start: 2020-12-14 | End: 2021-01-03 | Stop reason: ALTCHOICE

## 2020-12-14 ASSESSMENT — PATIENT HEALTH QUESTIONNAIRE - PHQ9
SUM OF ALL RESPONSES TO PHQ9 QUESTIONS 1 & 2: 1
SUM OF ALL RESPONSES TO PHQ QUESTIONS 1-9: 1
1. LITTLE INTEREST OR PLEASURE IN DOING THINGS: 0
2. FEELING DOWN, DEPRESSED OR HOPELESS: 1
SUM OF ALL RESPONSES TO PHQ9 QUESTIONS 1 & 2: 1
1. LITTLE INTEREST OR PLEASURE IN DOING THINGS: 0
2. FEELING DOWN, DEPRESSED OR HOPELESS: 1
SUM OF ALL RESPONSES TO PHQ QUESTIONS 1-9: 1

## 2020-12-14 ASSESSMENT — LIFESTYLE VARIABLES: HOW OFTEN DO YOU HAVE A DRINK CONTAINING ALCOHOL: 0

## 2020-12-14 NOTE — PROGRESS NOTES
Subjective:      Patient ID: Cassandra Thompson is a 68 y.o. female. This patient has a new complaint today and that his frequency of urination and dysuria. Has been having the symptoms for the past 3 to 4 days. . She denies any blood in the urine and she denies any flank pain. She had a urinary tract infection about 4 weeks ago which was treated with Cipro . Due to osteoarthritis she has pain in her knees and hips but she has no other complaints and systemic review is negative      Review of Systems= negative except joint pain    Objective:   Physical Exam= was a audio visit but she appeared to be alert and orientated and she gave me an excellent history    Assessment / Plan:      Diagnosis Orders   1. Essential hypertension = controlled    2. Permanent atrial fibrillation (HCC) = no recurrence    3. Iron deficiency anemia due to chronic blood loss= her last hemoglobin in August was 10.8, he has had GI work-up in the past including colonoscopic exam    4. Recurrent UTI = she will be treated with Cipro if she does not improve she will contact my office and she will be a candidate for renal ultrasound and repeat UA and a culture and sensitivity. Return in about 12 weeks (around 3/8/2021) for Follow Up, UTI. No orders of the defined types were placed in this encounter. Orders Placed This Encounter   Medications    ciprofloxacin (CIPRO) 500 MG tablet     Sig: Take 1 tablet by mouth 2 times daily     Dispense:  20 tablet     Refill:  0   Time spent to see this patient was 22 minutes      Visit Information    Have you changed or started any medications since your last visit including any over-the-counter medicines, vitamins, or herbal medicines? no   Are you having any side effects from any of your medications? -  no  Have you stopped taking any of your medications? Is so, why? -  no    Have you seen any other physician or provider since your last visit?  Yes - Records Obtained Have you had any other diagnostic tests since your last visit? Yes - Records Obtained  Have you been seen in the emergency room and/or had an admission to a hospital since we last saw you? No  Have you had your routine dental cleaning in the past 6 months? no    Have you activated your [employer] account? If not, what are your barriers?  Yes     Patient Care Team:  Abdirizak Cheugn MD as PCP - Theo Officer, MD as PCP - [facility] MD [facility] as Consulting Physician (Gastroenterology)    Medical History Review  Past Medical, Family, and Social History reviewed and does not contribute to the patient presenting condition    Health Maintenance   Topic Date Due    DTaP/Tdap/Td vaccine (1 - Tdap) 07/16/1961    Shingles Vaccine (1 of 2) 07/16/1992    DEXA (modify frequency per FRAX score)  07/16/1997    Annual Wellness Visit (AWV)  05/29/2019    Flu vaccine (1) 09/01/2020    Potassium monitoring  08/17/2021    Creatinine monitoring  08/17/2021    Pneumococcal 65+ years Vaccine  Completed    Hepatitis A vaccine  Aged Out    Hepatitis B vaccine  Aged Out    Hib vaccine  Aged Out    Meningococcal (ACWY) vaccine  Aged Out

## 2020-12-15 RX ORDER — TRAMADOL HYDROCHLORIDE 50 MG/1
50 TABLET ORAL EVERY 6 HOURS PRN
Qty: 60 TABLET | Refills: 3 | Status: SHIPPED | OUTPATIENT
Start: 2020-12-15 | End: 2021-04-05

## 2020-12-18 RX ORDER — LORAZEPAM 1 MG/1
1 TABLET ORAL EVERY 8 HOURS PRN
Qty: 90 TABLET | Refills: 0 | Status: SHIPPED | OUTPATIENT
Start: 2020-12-18 | End: 2021-02-19

## 2021-01-03 ENCOUNTER — TELEPHONE (OUTPATIENT)
Dept: INTERNAL MEDICINE CLINIC | Age: 81
End: 2021-01-03

## 2021-01-03 RX ORDER — AMOXICILLIN AND CLAVULANATE POTASSIUM 875; 125 MG/1; MG/1
1 TABLET, FILM COATED ORAL 2 TIMES DAILY
Qty: 20 TABLET | Refills: 0 | Status: SHIPPED | OUTPATIENT
Start: 2021-01-03 | End: 2021-01-13

## 2021-01-03 NOTE — TELEPHONE ENCOUNTER
Received patient call. C/o sore throat and earpain. 3 days  Has been taking ear drops, and tylenol, not effective. Requesting antibiotics  Sent to requested pharmacy. Pt advised to seek medical attention if any worsening/ difficulty breathing or difficulty swallowing.    Nancie Johnson

## 2021-01-06 ENCOUNTER — TELEPHONE (OUTPATIENT)
Dept: INTERNAL MEDICINE CLINIC | Age: 81
End: 2021-01-06

## 2021-01-06 NOTE — TELEPHONE ENCOUNTER
Sick Call    Allegra Caller   1940   [id number]   Elias Wyman MD   Allergies   Allergen Reactions    Bee Pollen Anaphylaxis    Iron Rash        Last Visit: 12/14/20  Reason for No Same Day Appt: pt does not want appt    Complaint: mouth and throat pain d/t possible tooth infection. Dr. Anderson Awe rx'd amoxicillin-clavulanate (AUGMENTIN) 875-125 MG per tablet on 1/3/21 for tooth infection but pt states that she is having mouth and throat pain d/t the possible tooth infection. Pt also states that the atbx is not working at all after taking 5 days to resolve the infection. Pt does not want to go to the dentist until infection has cleared. Pt is requesting orders for magic mouth wash rx'd to pharmacy and a different atbx that is liquid form so it's easier for her to swallow.     Pharmacy: [facility]

## 2021-02-15 DIAGNOSIS — I10 ESSENTIAL HYPERTENSION: ICD-10-CM

## 2021-02-15 RX ORDER — LISINOPRIL 10 MG/1
TABLET ORAL
Qty: 90 TABLET | Refills: 3 | Status: SHIPPED | OUTPATIENT
Start: 2021-02-15 | End: 2022-01-29 | Stop reason: SDUPTHER

## 2021-02-19 ENCOUNTER — TELEPHONE (OUTPATIENT)
Dept: INTERNAL MEDICINE CLINIC | Age: 81
End: 2021-02-19

## 2021-02-19 DIAGNOSIS — I10 ESSENTIAL HYPERTENSION: ICD-10-CM

## 2021-02-19 DIAGNOSIS — F41.9 ANXIETY: ICD-10-CM

## 2021-02-19 RX ORDER — OMEPRAZOLE 20 MG/1
20 CAPSULE, DELAYED RELEASE ORAL DAILY
Qty: 90 CAPSULE | Refills: 3 | Status: SHIPPED | OUTPATIENT
Start: 2021-02-19 | End: 2022-04-18

## 2021-02-19 RX ORDER — LORAZEPAM 1 MG/1
1 TABLET ORAL EVERY 8 HOURS PRN
Qty: 90 TABLET | Refills: 0 | Status: SHIPPED | OUTPATIENT
Start: 2021-02-19 | End: 2021-06-08 | Stop reason: SDUPTHER

## 2021-02-19 NOTE — TELEPHONE ENCOUNTER
Patient called stating the pharmacy had not received refill for ativan. Dion Hodgkins had approved this 2/19/2021. Gave pharmacy verbal order as written by Shakila Richter.

## 2021-02-25 ENCOUNTER — TELEPHONE (OUTPATIENT)
Dept: INTERNAL MEDICINE CLINIC | Age: 81
End: 2021-02-25

## 2021-02-25 NOTE — TELEPHONE ENCOUNTER
Patient called upset that her Tramadol script was not being fully filled with qty and doesn't understand why her insurance and the pharmacy will not give her the remainder of her pills. I informed patient that I would contact her pharmacy [facility] to get a better understanding of what the issue is.     Pharmacy states that insurance is requiring a Prior authorization and will be faxing over request.

## 2021-03-10 ENCOUNTER — TELEPHONE (OUTPATIENT)
Dept: INTERNAL MEDICINE CLINIC | Age: 81
End: 2021-03-10

## 2021-03-10 NOTE — TELEPHONE ENCOUNTER
LVM for patient to return office call. Called patient to clarify if she is requesting a refill for Tramadol. Received PA request, however med is not on patients current med list. Will approve with provider if patient is requesting.

## 2021-04-03 DIAGNOSIS — R52 CHRONIC GENERALIZED PAIN: ICD-10-CM

## 2021-04-03 DIAGNOSIS — G89.29 CHRONIC GENERALIZED PAIN: ICD-10-CM

## 2021-04-05 RX ORDER — TRAMADOL HYDROCHLORIDE 50 MG/1
50 TABLET ORAL EVERY 6 HOURS PRN
Qty: 120 TABLET | Refills: 0 | Status: SHIPPED | OUTPATIENT
Start: 2021-04-05 | End: 2021-05-05

## 2021-04-07 ENCOUNTER — TELEPHONE (OUTPATIENT)
Dept: INTERNAL MEDICINE CLINIC | Age: 81
End: 2021-04-07

## 2021-04-07 DIAGNOSIS — N39.0 URINARY TRACT INFECTION WITHOUT HEMATURIA, SITE UNSPECIFIED: Primary | ICD-10-CM

## 2021-04-07 RX ORDER — CIPROFLOXACIN 500 MG/1
500 TABLET, FILM COATED ORAL 2 TIMES DAILY
Qty: 20 TABLET | Refills: 0 | Status: SHIPPED | OUTPATIENT
Start: 2021-04-07 | End: 2021-04-17

## 2021-04-07 NOTE — TELEPHONE ENCOUNTER
Sick Call    Jackie Siddiqui   1940   [id number]   Florecita Casas MD   Allergies   Allergen Reactions    Bee Pollen Anaphylaxis    Iron Rash        Last Visit: 12/14/2020  Reason for No Same Day Appt:     Complaint: UTI, burning sensation and frequency.  Will order Urinalysis       Pharmacy: [facility] / Edw[facility] Est[facility]

## 2021-04-08 ENCOUNTER — HOSPITAL ENCOUNTER (OUTPATIENT)
Age: 81
Setting detail: SPECIMEN
Discharge: HOME | End: 2021-04-08
Payer: MEDICARE

## 2021-04-08 DIAGNOSIS — N39.0 URINARY TRACT INFECTION WITHOUT HEMATURIA, SITE UNSPECIFIED: ICD-10-CM

## 2021-04-09 LAB
-: ABNORMAL
AMORPHOUS: ABNORMAL
BACTERIA: ABNORMAL
BILIRUBIN URINE: NEGATIVE
CASTS UA: ABNORMAL /LPF (ref 0–2)
COLOR: YELLOW
COMMENT UA: ABNORMAL
CRYSTALS, UA: ABNORMAL /HPF
EPITHELIAL CELLS UA: ABNORMAL /HPF (ref 0–5)
GLUCOSE URINE: NEGATIVE
KETONES, URINE: NEGATIVE
LEUKOCYTE ESTERASE, URINE: ABNORMAL
MUCUS: ABNORMAL
NITRITE, URINE: NEGATIVE
OTHER OBSERVATIONS UA: ABNORMAL
PH UA: 5 (ref 5–8)
PROTEIN UA: ABNORMAL
RBC UA: ABNORMAL /HPF (ref 0–2)
RENAL EPITHELIAL, UA: ABNORMAL /HPF
SPECIFIC GRAVITY UA: 1.01 (ref 1–1.03)
TRICHOMONAS: ABNORMAL
TURBIDITY: ABNORMAL
URINE HGB: ABNORMAL
UROBILINOGEN, URINE: NORMAL
WBC UA: ABNORMAL /HPF (ref 0–5)
YEAST: ABNORMAL

## 2021-04-10 LAB
CULTURE: ABNORMAL
Lab: ABNORMAL
SPECIMEN DESCRIPTION: ABNORMAL

## 2021-05-17 DIAGNOSIS — G89.29 CHRONIC GENERALIZED PAIN: ICD-10-CM

## 2021-05-17 DIAGNOSIS — R52 CHRONIC GENERALIZED PAIN: ICD-10-CM

## 2021-05-17 RX ORDER — TRAMADOL HYDROCHLORIDE 50 MG/1
50 TABLET ORAL EVERY 6 HOURS PRN
Qty: 60 TABLET | Refills: 0 | Status: SHIPPED | OUTPATIENT
Start: 2021-05-17 | End: 2021-06-16

## 2021-06-08 DIAGNOSIS — I10 ESSENTIAL HYPERTENSION: ICD-10-CM

## 2021-06-08 DIAGNOSIS — F41.9 ANXIETY: ICD-10-CM

## 2021-06-08 RX ORDER — LORAZEPAM 1 MG/1
1 TABLET ORAL EVERY 8 HOURS PRN
Qty: 90 TABLET | Refills: 0 | Status: SHIPPED | OUTPATIENT
Start: 2021-06-08 | End: 2021-07-13

## 2021-06-08 NOTE — TELEPHONE ENCOUNTER
Medication Requested: Ativan    Last visit: 12/14/20  Next visit: 6/15/2021  Last refill: 2/19/21    Med contract on file:  [x] yes   [] no    Last urine drug screen:11/15/19   Consistent with medication(s):    [x] yes   [] no    Last OARRS ran: 1/2/19    Quantity of medication remaining: a few    Who will be picking rx up: pt    Pharmacy if escribed: [facility]

## 2021-06-15 ENCOUNTER — OFFICE VISIT (OUTPATIENT)
Dept: INTERNAL MEDICINE CLINIC | Age: 81
End: 2021-06-15
Payer: MEDICARE

## 2021-06-15 VITALS
SYSTOLIC BLOOD PRESSURE: 118 MMHG | DIASTOLIC BLOOD PRESSURE: 72 MMHG | OXYGEN SATURATION: 97 % | TEMPERATURE: 97 F | HEART RATE: 82 BPM | BODY MASS INDEX: 25.69 KG/M2 | WEIGHT: 164 LBS

## 2021-06-15 DIAGNOSIS — Z78.0 POST-MENOPAUSAL: ICD-10-CM

## 2021-06-15 DIAGNOSIS — I48.21 PERMANENT ATRIAL FIBRILLATION (HCC): Primary | ICD-10-CM

## 2021-06-15 DIAGNOSIS — G89.4 CHRONIC PAIN SYNDROME: ICD-10-CM

## 2021-06-15 DIAGNOSIS — I10 ESSENTIAL HYPERTENSION: ICD-10-CM

## 2021-06-15 DIAGNOSIS — H04.123 DRY EYES, BILATERAL: ICD-10-CM

## 2021-06-15 DIAGNOSIS — R21 RASH OF FACE: ICD-10-CM

## 2021-06-15 DIAGNOSIS — I27.20 PULMONARY HTN (HCC): ICD-10-CM

## 2021-06-15 DIAGNOSIS — E78.2 MIXED HYPERLIPIDEMIA: ICD-10-CM

## 2021-06-15 PROCEDURE — 99214 OFFICE O/P EST MOD 30 MIN: CPT | Performed by: INTERNAL MEDICINE

## 2021-06-15 RX ORDER — POLYETHYLENE GLYCOL 3350 17 G/17G
17 POWDER, FOR SOLUTION ORAL DAILY
Qty: 2 BOTTLE | Refills: 2 | Status: SHIPPED | OUTPATIENT
Start: 2021-06-15 | End: 2021-08-14

## 2021-06-15 RX ORDER — FLUOCINONIDE 0.5 MG/G
OINTMENT TOPICAL
Qty: 100 G | Refills: 1 | Status: SHIPPED | OUTPATIENT
Start: 2021-06-15 | End: 2021-06-22

## 2021-06-15 RX ORDER — POLYVINYL ALCOHOL 14 MG/ML
2 SOLUTION/ DROPS OPHTHALMIC PRN
Qty: 1 BOTTLE | Refills: 2 | Status: SHIPPED | OUTPATIENT
Start: 2021-06-15

## 2021-06-15 RX ORDER — LATANOPROST 50 UG/ML
SOLUTION/ DROPS OPHTHALMIC
COMMUNITY
Start: 2021-05-25

## 2021-06-15 SDOH — ECONOMIC STABILITY: FOOD INSECURITY: WITHIN THE PAST 12 MONTHS, THE FOOD YOU BOUGHT JUST DIDN'T LAST AND YOU DIDN'T HAVE MONEY TO GET MORE.: NEVER TRUE

## 2021-06-15 SDOH — ECONOMIC STABILITY: TRANSPORTATION INSECURITY
IN THE PAST 12 MONTHS, HAS THE LACK OF TRANSPORTATION KEPT YOU FROM MEDICAL APPOINTMENTS OR FROM GETTING MEDICATIONS?: NO

## 2021-06-15 SDOH — ECONOMIC STABILITY: TRANSPORTATION INSECURITY
IN THE PAST 12 MONTHS, HAS LACK OF TRANSPORTATION KEPT YOU FROM MEETINGS, WORK, OR FROM GETTING THINGS NEEDED FOR DAILY LIVING?: NO

## 2021-06-15 SDOH — ECONOMIC STABILITY: FOOD INSECURITY: WITHIN THE PAST 12 MONTHS, YOU WORRIED THAT YOUR FOOD WOULD RUN OUT BEFORE YOU GOT MONEY TO BUY MORE.: NEVER TRUE

## 2021-06-15 ASSESSMENT — SOCIAL DETERMINANTS OF HEALTH (SDOH): HOW HARD IS IT FOR YOU TO PAY FOR THE VERY BASICS LIKE FOOD, HOUSING, MEDICAL CARE, AND HEATING?: NOT HARD AT ALL

## 2021-06-15 ASSESSMENT — PATIENT HEALTH QUESTIONNAIRE - PHQ9
SUM OF ALL RESPONSES TO PHQ QUESTIONS 1-9: 2
1. LITTLE INTEREST OR PLEASURE IN DOING THINGS: 1
SUM OF ALL RESPONSES TO PHQ9 QUESTIONS 1 & 2: 2
2. FEELING DOWN, DEPRESSED OR HOPELESS: 1

## 2021-06-15 ASSESSMENT — LIFESTYLE VARIABLES: HOW OFTEN DO YOU HAVE A DRINK CONTAINING ALCOHOL: NEVER

## 2021-06-15 NOTE — PROGRESS NOTES
Subjective:      Patient ID: [place] Sayda is a 68 y.o. female. He has a rash on her forehead which is fading. She has a small lesion on her left orbit. Hypertension  This is a chronic problem. The current episode started more than 1 year ago. The problem is unchanged. Associated symptoms include anxiety. Pertinent negatives include no blurred vision, chest pain, headaches, malaise/fatigue, neck pain, orthopnea, palpitations, peripheral edema, PND, shortness of breath or sweats. There are no associated agents to hypertension. Risk factors for coronary artery disease include dyslipidemia. There are no compliance problems. Hypertensive end-organ damage includes left ventricular hypertrophy. There is no history of chronic renal disease or a hypertension causing med. Review of Systems   Constitutional: Positive for fatigue. Negative for malaise/fatigue. HENT: Negative. Eyes: Negative for blurred vision. Respiratory: Negative for shortness of breath. Cardiovascular: Negative. Negative for chest pain, palpitations, orthopnea and PND. Gastrointestinal: Negative. Endocrine: Negative. Musculoskeletal: Negative. Negative for neck pain. Skin: Negative. Allergic/Immunologic: Negative. Neurological: Negative. Negative for headaches. Hematological: Negative. Psychiatric/Behavioral: The patient is nervous/anxious. Objective:   Physical Exam  Constitutional:       Appearance: Normal appearance. HENT:      Head: Normocephalic and atraumatic. Nose: No congestion or rhinorrhea. Cardiovascular:      Rate and Rhythm: Rhythm irregular. Heart sounds: No murmur heard. No gallop. Comments: Apical HR 84/ minute   Musculoskeletal:      Cervical back: Normal range of motion. No rigidity. No muscular tenderness. Right lower leg: Edema present. Comments: Ankle edema    Skin:     General: Skin is warm and dry.              Comments: Left orbit small basal cell carcinoma she has a rash on her forehead which appears to be eczema. Neurological:      Mental Status: She is alert. Assessment / Plan:      Diagnosis Orders   1. Permanent atrial fibrillation (HCC) = apical heart rate was 72 and regular    2. Post-menopausal  DEXA Bone Density Axial Skeleton   3. Dry eyes, bilateral  polyvinyl alcohol (LIQUIFILM TEARS) 1.4 % ophthalmic solution   4. Essential hypertension = well-controlled    5. Pulmonary HTN ([address]     6. Mixed hyperlipidemia     7. Chronic pain syndrome = she is on analgesic therapy    8. Rash of face = she will be treated with Lidex cream and if she does not improve she was advised that she would be referred to a dermatologist especially about the lesion above her left eyebrow       Visit Information    Have you changed or started any medications since your last visit including any over-the-counter medicines, vitamins, or herbal medicines? no   Are you having any side effects from any of your medications? -  no  Have you stopped taking any of your medications? Is so, why? -  no    Have you seen any other physician or provider since your last visit? No  Have you had any other diagnostic tests since your last visit? No  Have you been seen in the emergency room and/or had an admission to a hospital since we last saw you? No  Have you had your routine dental cleaning in the past 6 months? no    Have you activated your [employer] account? If not, what are your barriers?  Yes     Patient Care Team:  Isha Lucas MD as PCP - Gerardo Diaz MD as PCP - Hal Rodriguez Provider  Aldo Del Valle MD as Consulting Physician (Gastroenterology)    Medical History Review  Past Medical, Family, and Social History reviewed and does not contribute to the patient presenting condition    Health Maintenance   Topic Date Due    Hepatitis C screen  Never done    DTaP/Tdap/Td vaccine (1 - Tdap) Never done    Shingles Vaccine (1 of 2) Never done    DEXA (modify frequency per FRAX score)  Never done    Annual Wellness Visit (AWV)  Never done    Potassium monitoring  08/17/2021    Creatinine monitoring  08/17/2021    Flu vaccine  Completed    Pneumococcal 65+ years Vaccine  Completed    COVID-19 Vaccine  Completed    Hepatitis A vaccine  Aged Out    Hepatitis B vaccine  Aged Out    Hib vaccine  Aged Out    Meningococcal (ACWY) vaccine  Aged Out        Return in about 6 months (around 12/15/2021) for Follow Up, hypertension. Orders Placed This Encounter   Procedures    DEXA Bone Density Axial Skeleton     Standing Status:   Future     Standing Expiration Date:   6/15/2022     Orders Placed This Encounter   Medications    polyethylene glycol (GLYCOLAX) 17 GM/SCOOP powder     Sig: Take 17 g by mouth daily     Dispense:  2 Bottle     Refill:  2    polyvinyl alcohol (LIQUIFILM TEARS) 1.4 % ophthalmic solution     Sig: Place 2 drops into both eyes as needed for Dry Eyes     Dispense:  1 Bottle     Refill:  2    fluocinonide (LIDEX) 0.05 % ointment     Sig: Apply topically 2 times daily.      Dispense:  100 g     Refill:  1

## 2021-06-18 DIAGNOSIS — R52 CHRONIC GENERALIZED PAIN: ICD-10-CM

## 2021-06-18 DIAGNOSIS — G89.29 CHRONIC GENERALIZED PAIN: ICD-10-CM

## 2021-06-18 NOTE — TELEPHONE ENCOUNTER
Medication Requested: Tramadol     Last visit: 6/15/2021  Next visit: 12/14/2021  Last refill:  5/17/2021    Med contract on file:  [x] yes   [] no     Last urine drug screen: 11/15/2019  Consistent with medication(s):    [] yes   [] no    Last OARRS ran: 1/2/19    Quantity of medication remaining: unsure    Who will be picking rx up:     Pharmacy if escribed: [facility]

## 2021-06-20 RX ORDER — TRAMADOL HYDROCHLORIDE 50 MG/1
50 TABLET ORAL EVERY 6 HOURS PRN
Qty: 120 TABLET | Refills: 0 | Status: SHIPPED | OUTPATIENT
Start: 2021-06-20 | End: 2021-09-14 | Stop reason: SDUPTHER

## 2021-06-22 RX ORDER — TRAMADOL HYDROCHLORIDE 50 MG/1
50 TABLET ORAL EVERY 6 HOURS PRN
Qty: 60 TABLET | Refills: 0 | Status: SHIPPED | OUTPATIENT
Start: 2021-06-22 | End: 2021-12-06

## 2021-07-13 DIAGNOSIS — I10 ESSENTIAL HYPERTENSION: ICD-10-CM

## 2021-07-13 DIAGNOSIS — F41.9 ANXIETY: ICD-10-CM

## 2021-07-13 RX ORDER — LORAZEPAM 1 MG/1
TABLET ORAL
Qty: 90 TABLET | Refills: 0 | Status: SHIPPED | OUTPATIENT
Start: 2021-07-13 | End: 2021-09-14 | Stop reason: SDUPTHER

## 2021-07-13 NOTE — TELEPHONE ENCOUNTER
Medication Requested: lorazepam    Last visit: 06/15/21  Next visit: 2021  Last refill: 21    Med contract on file:  [x] yes   [] no    Last urine drug screen: 11/15/19    Consistent with medication(s):    [x] yes   [] no    Last OARRS ran: unk    Quantity of medication remainin pills     Who will be picking rx up:     Ph[facility] escrib[facility]

## 2021-07-14 RX ORDER — LORAZEPAM 1 MG/1
1 TABLET ORAL EVERY 8 HOURS PRN
Qty: 90 TABLET | Refills: 0 | Status: SHIPPED | OUTPATIENT
Start: 2021-07-14 | End: 2021-08-13

## 2021-08-10 DIAGNOSIS — I10 ESSENTIAL HYPERTENSION: ICD-10-CM

## 2021-08-10 DIAGNOSIS — I27.20 PULMONARY HTN (HCC): ICD-10-CM

## 2021-08-10 RX ORDER — FUROSEMIDE 20 MG/1
20 TABLET ORAL DAILY
Qty: 90 TABLET | Refills: 3 | Status: SHIPPED | OUTPATIENT
Start: 2021-08-10 | End: 2022-08-01 | Stop reason: SDUPTHER

## 2021-09-14 DIAGNOSIS — G89.29 CHRONIC GENERALIZED PAIN: ICD-10-CM

## 2021-09-14 DIAGNOSIS — R52 CHRONIC GENERALIZED PAIN: ICD-10-CM

## 2021-09-14 DIAGNOSIS — F41.9 ANXIETY: ICD-10-CM

## 2021-09-14 DIAGNOSIS — I10 ESSENTIAL HYPERTENSION: ICD-10-CM

## 2021-09-14 RX ORDER — LORAZEPAM 1 MG/1
TABLET ORAL
Qty: 90 TABLET | Refills: 0 | Status: SHIPPED | OUTPATIENT
Start: 2021-09-14 | End: 2021-10-21

## 2021-09-14 RX ORDER — TRAMADOL HYDROCHLORIDE 50 MG/1
50 TABLET ORAL EVERY 6 HOURS PRN
Qty: 120 TABLET | Refills: 0 | Status: SHIPPED | OUTPATIENT
Start: 2021-09-14 | End: 2021-10-21

## 2021-09-14 NOTE — TELEPHONE ENCOUNTER
Medication Requested: Tramadol, Ativan    Last visit: 6/15/21  Next visit: 12/14/2021  Last refill: Tramadol 6/22/21, Ativan 7/14/21    Med contract on file:  [x] yes   [] no    Last urine drug screen: 11/15/19   Consistent with medication(s):    [] yes   [x] no Inconsistent for Tramadol     Last OARRS ran: 1/2/19    Quantity of medication remaining: none for Tramadol, Unsure how many pills for Ativan    Who will be picking rx up: pt    Pharmacy if escribed: [facility]

## 2021-10-14 ENCOUNTER — TELEPHONE (OUTPATIENT)
Dept: INTERNAL MEDICINE CLINIC | Age: 81
End: 2021-10-14

## 2021-10-14 ENCOUNTER — HOSPITAL ENCOUNTER (OUTPATIENT)
Age: 81
Setting detail: SPECIMEN
Discharge: HOME | End: 2021-10-14
Payer: MEDICARE

## 2021-10-14 DIAGNOSIS — N30.00 ACUTE CYSTITIS WITHOUT HEMATURIA: Primary | ICD-10-CM

## 2021-10-14 DIAGNOSIS — N30.00 ACUTE CYSTITIS WITHOUT HEMATURIA: ICD-10-CM

## 2021-10-14 LAB
-: NORMAL
AMORPHOUS: NORMAL
BACTERIA: NORMAL
BILIRUBIN URINE: NEGATIVE
CASTS UA: NORMAL /LPF (ref 0–8)
COLOR: YELLOW
COMMENT UA: ABNORMAL
CRYSTALS, UA: NORMAL /HPF
EPITHELIAL CELLS UA: NORMAL /HPF (ref 0–5)
GLUCOSE URINE: NEGATIVE
KETONES, URINE: NEGATIVE
LEUKOCYTE ESTERASE, URINE: ABNORMAL
MUCUS: NORMAL
NITRITE, URINE: NEGATIVE
OTHER OBSERVATIONS UA: NORMAL
PH UA: 5.5 (ref 5–8)
PROTEIN UA: NEGATIVE
RBC UA: NORMAL /HPF (ref 0–4)
RENAL EPITHELIAL, UA: NORMAL /HPF
SPECIFIC GRAVITY UA: 1.01 (ref 1–1.03)
TRICHOMONAS: NORMAL
TURBIDITY: CLEAR
URINE HGB: NEGATIVE
UROBILINOGEN, URINE: NORMAL
WBC UA: NORMAL /HPF (ref 0–5)
YEAST: NORMAL

## 2021-10-14 NOTE — TELEPHONE ENCOUNTER
.  Sick Call    Priscila Zamorano   1940   [id number]   Obie Rocha MD   Allergies   Allergen Reactions    Bee Pollen Anaphylaxis    Iron Rash        Last Visit: 6/15/21  Reason for No Same Day Appt: no transportation. Complaint: painful when urinating x 1 wk W/ Frequency. Cipro worked well the last time pt had uti. Pharmacy: [address] [place] [address] ordered Pt states she will come into office do today.

## 2021-10-15 RX ORDER — CEFUROXIME AXETIL 250 MG/1
250 TABLET ORAL 2 TIMES DAILY
Qty: 20 TABLET | Refills: 0 | Status: SHIPPED | OUTPATIENT
Start: 2021-10-15 | End: 2021-10-25

## 2021-10-16 LAB
CULTURE: ABNORMAL
Lab: ABNORMAL
SPECIMEN DESCRIPTION: ABNORMAL

## 2021-10-20 DIAGNOSIS — G89.29 CHRONIC GENERALIZED PAIN: ICD-10-CM

## 2021-10-20 DIAGNOSIS — R52 CHRONIC GENERALIZED PAIN: ICD-10-CM

## 2021-10-21 DIAGNOSIS — I10 ESSENTIAL HYPERTENSION: ICD-10-CM

## 2021-10-21 DIAGNOSIS — F41.9 ANXIETY: ICD-10-CM

## 2021-10-21 RX ORDER — TRAMADOL HYDROCHLORIDE 50 MG/1
50 TABLET ORAL EVERY 6 HOURS PRN
Qty: 120 TABLET | Refills: 0 | Status: SHIPPED | OUTPATIENT
Start: 2021-10-21 | End: 2022-01-07

## 2021-10-21 RX ORDER — LORAZEPAM 1 MG/1
TABLET ORAL
Qty: 90 TABLET | Refills: 0 | Status: SHIPPED | OUTPATIENT
Start: 2021-10-21 | End: 2021-12-06 | Stop reason: SDUPTHER

## 2021-11-10 ENCOUNTER — TELEPHONE (OUTPATIENT)
Dept: INTERNAL MEDICINE CLINIC | Age: 81
End: 2021-11-10

## 2021-11-10 DIAGNOSIS — N39.0 RECURRENT UTI: Primary | ICD-10-CM

## 2021-11-10 NOTE — TELEPHONE ENCOUNTER
Patient called stating that she finished antibiotic Ceftin last month for a UTI, she is now experiencing symptoms again of burning sensation and frequency.     Patient is requesting another round of antibiotic , states that last time she received Ciprofloxacin       [facility]

## 2021-11-11 ENCOUNTER — TELEPHONE (OUTPATIENT)
Dept: INTERNAL MEDICINE CLINIC | Age: 81
End: 2021-11-11

## 2021-11-11 RX ORDER — CIPROFLOXACIN 500 MG/1
500 TABLET, FILM COATED ORAL 2 TIMES DAILY
Qty: 20 TABLET | Refills: 0 | Status: SHIPPED | OUTPATIENT
Start: 2021-11-11 | End: 2021-11-21

## 2021-11-11 NOTE — TELEPHONE ENCOUNTER
Patient contacted [facility] again for medications.    Dr. Moreau Patches is out of the office    Please advise

## 2021-11-11 NOTE — TELEPHONE ENCOUNTER
----- Message from Marilia Hirsch sent at 11/11/2021 11:09 AM EST -----  Subject: Refill Request    QUESTIONS  Name of Medication? ciprofloxacin (CIPRO) 500 MG tablet  Patient-reported dosage and instructions? Take 1 tablet by mouth 2 times   daily for 10 days  How many days do you have left? 0  Preferred Pharmacy? [facility]  phone number (if available)? [phone]  Additional Information for Provider? pt needs meds, she is really sick. ---------------------------------------------------------------------------  --------------  Britt Paiz INFO  What is the best way for the office to contact you? OK to leave message on   voicemail  Preferred Call Back Phone Number?  [phone]

## 2021-12-06 DIAGNOSIS — I10 ESSENTIAL HYPERTENSION: ICD-10-CM

## 2021-12-06 DIAGNOSIS — R52 CHRONIC GENERALIZED PAIN: ICD-10-CM

## 2021-12-06 DIAGNOSIS — F41.9 ANXIETY: ICD-10-CM

## 2021-12-06 DIAGNOSIS — G89.29 CHRONIC GENERALIZED PAIN: ICD-10-CM

## 2021-12-06 RX ORDER — TRAMADOL HYDROCHLORIDE 50 MG/1
50 TABLET ORAL EVERY 6 HOURS PRN
Qty: 60 TABLET | Refills: 0 | Status: SHIPPED | OUTPATIENT
Start: 2021-12-06 | End: 2022-01-07 | Stop reason: SDUPTHER

## 2021-12-06 RX ORDER — LORAZEPAM 1 MG/1
TABLET ORAL
Qty: 90 TABLET | Refills: 0 | Status: SHIPPED | OUTPATIENT
Start: 2021-12-06 | End: 2022-01-24 | Stop reason: SDUPTHER

## 2021-12-06 NOTE — TELEPHONE ENCOUNTER
Patient requesting refill of Ativan and Tramadol. Patient will need updated UDS and Med Contract. She is scheduled 12/14, could not move appointment up due to transportation.

## 2021-12-14 ENCOUNTER — OFFICE VISIT (OUTPATIENT)
Dept: INTERNAL MEDICINE CLINIC | Age: 81
End: 2021-12-14
Payer: MEDICARE

## 2021-12-14 VITALS
OXYGEN SATURATION: 96 % | DIASTOLIC BLOOD PRESSURE: 84 MMHG | HEIGHT: 67 IN | SYSTOLIC BLOOD PRESSURE: 128 MMHG | HEART RATE: 94 BPM | WEIGHT: 162 LBS | BODY MASS INDEX: 25.43 KG/M2

## 2021-12-14 DIAGNOSIS — I10 PRIMARY HYPERTENSION: Primary | ICD-10-CM

## 2021-12-14 DIAGNOSIS — K57.90 DIVERTICULOSIS: ICD-10-CM

## 2021-12-14 DIAGNOSIS — E78.2 MIXED HYPERLIPIDEMIA: ICD-10-CM

## 2021-12-14 DIAGNOSIS — I48.21 PERMANENT ATRIAL FIBRILLATION (HCC): ICD-10-CM

## 2021-12-14 DIAGNOSIS — G51.0 BELL'S PALSY: ICD-10-CM

## 2021-12-14 DIAGNOSIS — I27.20 PULMONARY HTN (HCC): ICD-10-CM

## 2021-12-14 DIAGNOSIS — G89.4 CHRONIC PAIN SYNDROME: ICD-10-CM

## 2021-12-14 DIAGNOSIS — K26.9 DUODENAL ULCER: ICD-10-CM

## 2021-12-14 PROCEDURE — 1090F PRES/ABSN URINE INCON ASSESS: CPT | Performed by: INTERNAL MEDICINE

## 2021-12-14 PROCEDURE — 4040F PNEUMOC VAC/ADMIN/RCVD: CPT | Performed by: INTERNAL MEDICINE

## 2021-12-14 PROCEDURE — 99214 OFFICE O/P EST MOD 30 MIN: CPT | Performed by: INTERNAL MEDICINE

## 2021-12-14 PROCEDURE — G8427 DOCREV CUR MEDS BY ELIG CLIN: HCPCS | Performed by: INTERNAL MEDICINE

## 2021-12-14 PROCEDURE — 1123F ACP DISCUSS/DSCN MKR DOCD: CPT | Performed by: INTERNAL MEDICINE

## 2021-12-14 PROCEDURE — G8400 PT W/DXA NO RESULTS DOC: HCPCS | Performed by: INTERNAL MEDICINE

## 2021-12-14 PROCEDURE — 1036F TOBACCO NON-USER: CPT | Performed by: INTERNAL MEDICINE

## 2021-12-14 PROCEDURE — G8484 FLU IMMUNIZE NO ADMIN: HCPCS | Performed by: INTERNAL MEDICINE

## 2021-12-14 PROCEDURE — G8417 CALC BMI ABV UP PARAM F/U: HCPCS | Performed by: INTERNAL MEDICINE

## 2021-12-14 RX ORDER — LIDOCAINE 50 MG/G
1 PATCH TOPICAL DAILY
Qty: 30 PATCH | Refills: 0 | Status: SHIPPED | OUTPATIENT
Start: 2021-12-14 | End: 2022-01-13

## 2021-12-14 ASSESSMENT — ENCOUNTER SYMPTOMS
ALLERGIC/IMMUNOLOGIC NEGATIVE: 1
ORTHOPNEA: 0
SHORTNESS OF BREATH: 0
GASTROINTESTINAL NEGATIVE: 1
BLURRED VISION: 0

## 2021-12-14 NOTE — PROGRESS NOTES
Subjective:      Patient ID: Farhat Penn is a 80 y.o. female. She is asymptomatic except for chronic backache which is moderate    Hypertension  This is a chronic problem. The current episode started more than 1 year ago. The problem is unchanged. Associated symptoms include anxiety. Pertinent negatives include no blurred vision, chest pain, headaches, malaise/fatigue, neck pain, orthopnea, palpitations, peripheral edema, PND, shortness of breath or sweats. There are no associated agents to hypertension. Risk factors for coronary artery disease include dyslipidemia. There are no compliance problems. Hypertensive end-organ damage includes left ventricular hypertrophy. There is no history of chronic renal disease or a hypertension causing med. Review of Systems   Constitutional: Negative for fatigue and malaise/fatigue. HENT: Negative. Eyes: Negative for blurred vision. Respiratory: Negative for shortness of breath. Cardiovascular: Negative. Negative for chest pain, palpitations, orthopnea and PND. Gastrointestinal: Negative. Endocrine: Negative. Musculoskeletal: Negative. Negative for neck pain. Skin: Negative. Allergic/Immunologic: Negative. Neurological: Negative. Negative for headaches. Hematological: Negative. Psychiatric/Behavioral: The patient is nervous/anxious. Objective:   Physical Exam  Constitutional:       Appearance: Normal appearance. HENT:      Head: Normocephalic and atraumatic. Nose: No congestion or rhinorrhea. Cardiovascular:      Rate and Rhythm: Rhythm irregular. Heart sounds: No murmur heard. No gallop. Comments: Apical HR 84/ minute   Musculoskeletal:      Cervical back: Normal range of motion. No rigidity. No muscular tenderness. Right lower leg: No edema. Comments: Ankle edema    Skin:     General: Skin is warm and dry.              Comments: Left orbit small basal cell carcinoma she has a rash on her forehead which appears to be eczema. Neurological:      Mental Status: She is alert. Assessment / Plan:      Diagnosis Orders   1. Primary hypertension = BP is well controlled CBC Auto Differential    Comprehensive Metabolic Panel    Sedimentation rate, automated    TSH without Reflex    Urinalysis with Microscopic    C-Reactive Protein   2. Permanent atrial fibrillation (HCC)     3. Pulmonary HTN (HCC)     4. Bell's palsy     5. Mixed hyperlipidemia     6. Diverticulosis     7. Duodenal ulcer     8. Chronic pain syndrome = she is on analgesic agent        Return in about 6 months (around 6/14/2022) for Follow Up. Orders Placed This Encounter   Procedures    CBC Auto Differential     Standing Status:   Future     Standing Expiration Date:   12/14/2022    Comprehensive Metabolic Panel     Standing Status:   Future     Standing Expiration Date:   12/14/2022    Sedimentation rate, automated     Standing Status:   Future     Standing Expiration Date:   12/14/2022    TSH without Reflex     Standing Status:   Future     Standing Expiration Date:   12/14/2022    Urinalysis with Microscopic     Standing Status:   Future     Standing Expiration Date:   12/14/2022     Order Specific Question:   SPECIFY(EX-CATH,MIDSTREAM,CYSTO,ETC)? Answer:   clean catch    C-Reactive Protein     Standing Status:   Future     Standing Expiration Date:   12/14/2022     No orders of the defined types were placed in this encounter. Visit Information    Have you changed or started any medications since your last visit including any over-the-counter medicines, vitamins, or herbal medicines? no   Are you having any side effects from any of your medications? -  no  Have you stopped taking any of your medications? Is so, why? -  no    Have you seen any other physician or provider since your last visit? No  Have you had any other diagnostic tests since your last visit?  Yes - Records Obtained  Have you been seen in the emergency room and/or had an admission to a hospital since we last saw you? No  Have you had your routine dental cleaning in the past 6 months? no    Have you activated your [employer] account? If not, what are your barriers?  Yes     Patient Care Team:  Sunil Ureña MD as PCP - Madison Gilbert MD as PCP - [facility] Provider  Faby Elam MD as Consulting Physician (Gastroenterology)    Medical History Review  Past Medical, Family, and Social History reviewed and does not contribute to the patient presenting condition    Health Maintenance   Topic Date Due    Hepatitis C screen  Never done    DTaP/Tdap/Td vaccine (1 - Tdap) Never done    Shingles Vaccine (1 of 2) Never done    Low dose CT lung screening  Never done    DEXA (modify frequency per FRAX score)  Never done    Potassium monitoring  08/17/2021    Creatinine monitoring  08/17/2021    Annual Wellness Visit (AWV)  12/15/2021    Flu vaccine  Completed    Pneumococcal 65+ years Vaccine  Completed    COVID-19 Vaccine  Completed    Hepatitis A vaccine  Aged Out    Hepatitis B vaccine  Aged Out    Hib vaccine  Aged Out    Meningococcal (ACWY) vaccine  Aged Out

## 2021-12-16 ENCOUNTER — HOSPITAL ENCOUNTER (OUTPATIENT)
Age: 81
Discharge: HOME | End: 2021-12-16
Payer: MEDICARE

## 2021-12-16 DIAGNOSIS — I10 PRIMARY HYPERTENSION: ICD-10-CM

## 2021-12-16 LAB
-: ABNORMAL
ABSOLUTE EOS #: 0.1 K/UL (ref 0–0.4)
ABSOLUTE IMMATURE GRANULOCYTE: ABNORMAL K/UL (ref 0–0.3)
ABSOLUTE LYMPH #: 2.1 K/UL (ref 1–4.8)
ABSOLUTE MONO #: 0.6 K/UL (ref 0.1–1.3)
ALBUMIN SERPL-MCNC: 3.9 G/DL (ref 3.5–5.2)
ALBUMIN/GLOBULIN RATIO: ABNORMAL (ref 1–2.5)
ALP BLD-CCNC: 83 U/L (ref 35–104)
ALT SERPL-CCNC: 11 U/L (ref 5–33)
AMORPHOUS: ABNORMAL
ANION GAP SERPL CALCULATED.3IONS-SCNC: 9 MMOL/L (ref 9–17)
AST SERPL-CCNC: 18 U/L
BACTERIA: ABNORMAL
BASOPHILS # BLD: 1 % (ref 0–2)
BASOPHILS ABSOLUTE: 0.1 K/UL (ref 0–0.2)
BILIRUB SERPL-MCNC: 0.68 MG/DL (ref 0.3–1.2)
BILIRUBIN URINE: NEGATIVE
BUN BLDV-MCNC: 12 MG/DL (ref 8–23)
BUN/CREAT BLD: ABNORMAL (ref 9–20)
C-REACTIVE PROTEIN: <3 MG/L (ref 0–5)
CALCIUM SERPL-MCNC: 9.9 MG/DL (ref 8.6–10.4)
CASTS UA: ABNORMAL /LPF
CHLORIDE BLD-SCNC: 105 MMOL/L (ref 98–107)
CO2: 25 MMOL/L (ref 20–31)
COLOR: YELLOW
COMMENT UA: ABNORMAL
CREAT SERPL-MCNC: 0.64 MG/DL (ref 0.5–0.9)
CRYSTALS, UA: ABNORMAL /HPF
DIFFERENTIAL TYPE: ABNORMAL
EOSINOPHILS RELATIVE PERCENT: 1 % (ref 0–4)
EPITHELIAL CELLS UA: ABNORMAL /HPF
GFR AFRICAN AMERICAN: >60 ML/MIN
GFR NON-AFRICAN AMERICAN: >60 ML/MIN
GFR SERPL CREATININE-BSD FRML MDRD: ABNORMAL ML/MIN/{1.73_M2}
GFR SERPL CREATININE-BSD FRML MDRD: ABNORMAL ML/MIN/{1.73_M2}
GLUCOSE BLD-MCNC: 104 MG/DL (ref 70–99)
GLUCOSE URINE: NEGATIVE
HCT VFR BLD CALC: 41.4 % (ref 36–46)
HEMOGLOBIN: 13.4 G/DL (ref 12–16)
IMMATURE GRANULOCYTES: ABNORMAL %
KETONES, URINE: NEGATIVE
LEUKOCYTE ESTERASE, URINE: ABNORMAL
LYMPHOCYTES # BLD: 35 % (ref 24–44)
MCH RBC QN AUTO: 29.1 PG (ref 26–34)
MCHC RBC AUTO-ENTMCNC: 32.3 G/DL (ref 31–37)
MCV RBC AUTO: 90.1 FL (ref 80–100)
MONOCYTES # BLD: 11 % (ref 1–7)
MUCUS: ABNORMAL
NITRITE, URINE: NEGATIVE
NRBC AUTOMATED: ABNORMAL PER 100 WBC
OTHER OBSERVATIONS UA: ABNORMAL
PDW BLD-RTO: 18.7 % (ref 11.5–14.9)
PH UA: 6 (ref 5–8)
PLATELET # BLD: 238 K/UL (ref 150–450)
PLATELET ESTIMATE: ABNORMAL
PMV BLD AUTO: 7.8 FL (ref 6–12)
POTASSIUM SERPL-SCNC: 4.3 MMOL/L (ref 3.7–5.3)
PROTEIN UA: ABNORMAL
RBC # BLD: 4.6 M/UL (ref 4–5.2)
RBC # BLD: ABNORMAL 10*6/UL
RBC UA: ABNORMAL /HPF
RENAL EPITHELIAL, UA: ABNORMAL /HPF
SEDIMENTATION RATE, ERYTHROCYTE: 50 MM (ref 0–30)
SEG NEUTROPHILS: 52 % (ref 36–66)
SEGMENTED NEUTROPHILS ABSOLUTE COUNT: 3.1 K/UL (ref 1.3–9.1)
SODIUM BLD-SCNC: 139 MMOL/L (ref 135–144)
SPECIFIC GRAVITY UA: 1.01 (ref 1–1.03)
TOTAL PROTEIN: 7.8 G/DL (ref 6.4–8.3)
TRICHOMONAS: ABNORMAL
TSH SERPL DL<=0.05 MIU/L-ACNC: 2.2 MIU/L (ref 0.3–5)
TURBIDITY: ABNORMAL
URINE HGB: ABNORMAL
UROBILINOGEN, URINE: NORMAL
WBC # BLD: 6 K/UL (ref 3.5–11)
WBC # BLD: ABNORMAL 10*3/UL
WBC UA: ABNORMAL /HPF
YEAST: ABNORMAL

## 2021-12-16 PROCEDURE — 81001 URINALYSIS AUTO W/SCOPE: CPT

## 2021-12-16 PROCEDURE — 84443 ASSAY THYROID STIM HORMONE: CPT

## 2021-12-16 PROCEDURE — 85025 COMPLETE CBC W/AUTO DIFF WBC: CPT

## 2021-12-16 PROCEDURE — 85652 RBC SED RATE AUTOMATED: CPT

## 2021-12-16 PROCEDURE — 36415 COLL VENOUS BLD VENIPUNCTURE: CPT

## 2021-12-16 PROCEDURE — 86140 C-REACTIVE PROTEIN: CPT

## 2021-12-16 PROCEDURE — 80053 COMPREHEN METABOLIC PANEL: CPT

## 2022-01-07 DIAGNOSIS — R52 CHRONIC GENERALIZED PAIN: ICD-10-CM

## 2022-01-07 DIAGNOSIS — G89.29 CHRONIC GENERALIZED PAIN: ICD-10-CM

## 2022-01-07 RX ORDER — TRAMADOL HYDROCHLORIDE 50 MG/1
50 TABLET ORAL EVERY 6 HOURS PRN
Qty: 60 TABLET | Refills: 0 | Status: SHIPPED | OUTPATIENT
Start: 2022-01-07 | End: 2022-01-24 | Stop reason: SDUPTHER

## 2022-01-07 NOTE — TELEPHONE ENCOUNTER
Medication Requested: Tramadol    Last visit: 12/14/2021  Next visit: Visit date not found  Last refill: 12/6/21    Med contract on file:  [x] yes   [] no    Last urine drug screen: 11/15/19  Consistent with medication(s):    [] yes   [] no    Last OARRS ran: unknown    Quantity of medication remaining: a 2 days    Who will be picking rx up:     Pharmacy if escribed: [address] [place] [address],[address]

## 2022-01-24 DIAGNOSIS — F41.9 ANXIETY: ICD-10-CM

## 2022-01-24 DIAGNOSIS — I10 ESSENTIAL HYPERTENSION: ICD-10-CM

## 2022-01-24 DIAGNOSIS — R52 CHRONIC GENERALIZED PAIN: ICD-10-CM

## 2022-01-24 DIAGNOSIS — G89.29 CHRONIC GENERALIZED PAIN: ICD-10-CM

## 2022-01-24 RX ORDER — LORAZEPAM 1 MG/1
TABLET ORAL
Qty: 90 TABLET | Refills: 0 | Status: SHIPPED | OUTPATIENT
Start: 2022-01-24 | End: 2022-02-22 | Stop reason: SDUPTHER

## 2022-01-24 RX ORDER — TRAMADOL HYDROCHLORIDE 50 MG/1
50 TABLET ORAL EVERY 6 HOURS PRN
Qty: 60 TABLET | Refills: 0 | Status: SHIPPED | OUTPATIENT
Start: 2022-01-24 | End: 2022-02-22 | Stop reason: SDUPTHER

## 2022-01-28 DIAGNOSIS — I10 ESSENTIAL HYPERTENSION: ICD-10-CM

## 2022-01-29 RX ORDER — LISINOPRIL 10 MG/1
TABLET ORAL
Qty: 90 TABLET | Refills: 3 | Status: SHIPPED | OUTPATIENT
Start: 2022-01-29 | End: 2022-06-27 | Stop reason: SDUPTHER

## 2022-01-31 ENCOUNTER — TELEPHONE (OUTPATIENT)
Dept: INTERNAL MEDICINE CLINIC | Age: 82
End: 2022-01-31

## 2022-01-31 NOTE — TELEPHONE ENCOUNTER
Pain states she has discussed her knee pain with you. She is asking if she can try a short term steroid to see if this helps with the pain. She is tired of going to bed hurting and waking up hurting. Please advise.     Allergies   Allergen Reactions    Bee Pollen Anaphylaxis    Iron Rash

## 2022-01-31 NOTE — TELEPHONE ENCOUNTER
To come for injection or see orthopedic surgeon if she wants to [facility]

## 2022-02-01 NOTE — TELEPHONE ENCOUNTER
Patient denies any injections or Ortho referral. She stated the Tramadol works short term but she would like to know if there is a medication she can take for her arthritis that causes pain throughout her whole body?     Please advise

## 2022-02-22 DIAGNOSIS — G89.29 CHRONIC GENERALIZED PAIN: ICD-10-CM

## 2022-02-22 DIAGNOSIS — F41.9 ANXIETY: ICD-10-CM

## 2022-02-22 DIAGNOSIS — R52 CHRONIC GENERALIZED PAIN: ICD-10-CM

## 2022-02-22 DIAGNOSIS — I10 ESSENTIAL HYPERTENSION: ICD-10-CM

## 2022-02-22 RX ORDER — LORAZEPAM 1 MG/1
TABLET ORAL
Qty: 90 TABLET | Refills: 0 | Status: SHIPPED | OUTPATIENT
Start: 2022-02-22 | End: 2022-03-28 | Stop reason: SDUPTHER

## 2022-02-22 RX ORDER — TRAMADOL HYDROCHLORIDE 50 MG/1
50 TABLET ORAL EVERY 6 HOURS PRN
Qty: 60 TABLET | Refills: 0 | Status: SHIPPED | OUTPATIENT
Start: 2022-02-22 | End: 2022-03-28 | Stop reason: SDUPTHER

## 2022-02-22 NOTE — TELEPHONE ENCOUNTER
Medication Requested: tramadol  Lorazepam    Last visit: 21    Next visit: Visit date not found    Last refill: 22 on both    Med contract on file:  [x] yes   [] no    Last urine drug screen: 11/15/2019    Consistent with medication(s):    [] yes   [] no    Last OARRS ran: unk    Quantity of medication remainin pills    Who will be picking rx up:     Pharm[facility] escrib[facility]

## 2022-03-09 ENCOUNTER — TELEPHONE (OUTPATIENT)
Dept: INTERNAL MEDICINE CLINIC | Age: 82
End: 2022-03-09

## 2022-03-09 NOTE — TELEPHONE ENCOUNTER
[facility] is mailing forms over to be completed by Dr Evan Burt for transportation assistance. When these are completed, please mail back to the patient and let her know.

## 2022-03-15 ENCOUNTER — OFFICE VISIT (OUTPATIENT)
Dept: INTERNAL MEDICINE CLINIC | Age: 82
End: 2022-03-15
Payer: MEDICARE

## 2022-03-15 VITALS
SYSTOLIC BLOOD PRESSURE: 130 MMHG | OXYGEN SATURATION: 98 % | BODY MASS INDEX: 24.48 KG/M2 | WEIGHT: 156 LBS | HEIGHT: 67 IN | DIASTOLIC BLOOD PRESSURE: 84 MMHG | HEART RATE: 85 BPM

## 2022-03-15 DIAGNOSIS — B02.9 HERPES ZOSTER WITHOUT COMPLICATION: ICD-10-CM

## 2022-03-15 PROCEDURE — G8400 PT W/DXA NO RESULTS DOC: HCPCS | Performed by: INTERNAL MEDICINE

## 2022-03-15 PROCEDURE — 4040F PNEUMOC VAC/ADMIN/RCVD: CPT | Performed by: INTERNAL MEDICINE

## 2022-03-15 PROCEDURE — 1123F ACP DISCUSS/DSCN MKR DOCD: CPT | Performed by: INTERNAL MEDICINE

## 2022-03-15 PROCEDURE — 99213 OFFICE O/P EST LOW 20 MIN: CPT | Performed by: INTERNAL MEDICINE

## 2022-03-15 PROCEDURE — 1090F PRES/ABSN URINE INCON ASSESS: CPT | Performed by: INTERNAL MEDICINE

## 2022-03-15 PROCEDURE — G8427 DOCREV CUR MEDS BY ELIG CLIN: HCPCS | Performed by: INTERNAL MEDICINE

## 2022-03-15 PROCEDURE — 1036F TOBACCO NON-USER: CPT | Performed by: INTERNAL MEDICINE

## 2022-03-15 PROCEDURE — G8484 FLU IMMUNIZE NO ADMIN: HCPCS | Performed by: INTERNAL MEDICINE

## 2022-03-15 PROCEDURE — G8420 CALC BMI NORM PARAMETERS: HCPCS | Performed by: INTERNAL MEDICINE

## 2022-03-15 RX ORDER — FAMCICLOVIR 500 MG/1
500 TABLET, FILM COATED ORAL 3 TIMES DAILY
Qty: 21 TABLET | Refills: 0 | Status: SHIPPED | OUTPATIENT
Start: 2022-03-15 | End: 2022-03-22 | Stop reason: SDUPTHER

## 2022-03-15 NOTE — PROGRESS NOTES
Changed to Ortho by dr Loyd. Sent back to pharmacy   Visit Information    Have you changed or started any medications since your last visit including any over-the-counter medicines, vitamins, or herbal medicines? no   Are you having any side effects from any of your medications? -  no  Have you stopped taking any of your medications? Is so, why? -  no    Have you seen any other physician or provider since your last visit? No  Have you had any other diagnostic tests since your last visit? No  Have you been seen in the emergency room and/or had an admission to a hospital since we last saw you? No  Have you had your routine dental cleaning in the past 6 months? no    Have you activated your [employer] account? If not, what are your barriers?  Yes     Patient Care Team:  Olivia Verdugo MD as PCP - Elias Cortez MD as PCP - [facility] Provider  Rosemary Arzate MD as Consulting Physician (Gastroenterology)    Medical History Review  Past Medical, Family, and Social History reviewed and does contribute to the patient presenting condition    Health Maintenance   Topic Date Due    Hepatitis C screen  Never done    DTaP/Tdap/Td vaccine (1 - Tdap) Never done    Shingles Vaccine (1 of 2) Never done    Low dose CT lung screening  Never done    DEXA (modify frequency per FRAX score)  Never done    Annual Wellness Visit (AWV)  12/15/2021    Depression Screen  06/15/2022    Potassium monitoring  12/16/2022    Creatinine monitoring  12/16/2022    Flu vaccine  Completed    Pneumococcal 65+ years Vaccine  Completed    COVID-19 Vaccine  Completed    Hepatitis A vaccine  Aged Out    Hepatitis B vaccine  Aged Out    Hib vaccine  Aged Out    Meningococcal (ACWY) vaccine  Aged Out                                                                                                                                                                                                       OFFICE VISIT  PROGRESS NOTE         Date of patient's visit: 3/15/22  Patient's Name:  Shahida Valdez  YOB: 1940       Patient Care Team:  Colleen Gutierrez MD as PCP - Teressa Zavala MD as PCP - [facility]  You Lau MD as Consulting Physician (Gastroenterology)        SUBJECTIVE     HISTORY           History of present illness     Pertinent details  added to ,       Chief Complaint   Patient presents with   [address]     on back           Encounter Diagnosis   Name Primary?  Herpes zoster without complication left          Symptom / problem          --history obtained from patient. -   *Patient complained that she she has a rash on her back which is bothering her and she probably has a spider bite    On examination the rash is classical herpes zoster affecting the dorsal segments probably D10  With some erythema and vesicular rash   Pain is mild        MEDICATIONS:      Current Outpatient Medications   Medication Sig Dispense Refill    famciclovir (FAMVIR) 500 MG tablet Take 1 tablet by mouth 3 times daily for 7 days 21 tablet 0    LORazepam (ATIVAN) 1 MG tablet TAKE 1 TABLET BY MOUTH UP TO EVERY 8 HOURS AS NEEDED FOR ANXIETY (30-DAY SUPPLY) 90 tablet 0    traMADol (ULTRAM) 50 MG tablet Take 1 tablet by mouth every 6 hours as needed for Pain for up to 30 days.  60 tablet 0    lisinopril (PRINIVIL;ZESTRIL) 10 MG tablet TAKE ONE TABLET BY MOUTH ONCE A DAY 90 tablet 3    furosemide (LASIX) 20 MG tablet TAKE 1 TABLET BY MOUTH DAILY 90 tablet 3    metoprolol tartrate (LOPRESSOR) 25 MG tablet TAKE 1 TABLET BY MOUTH 2 TIMES DAILY 180 tablet 3    latanoprost (XALATAN) 0.005 % ophthalmic solution INSTILL 1 DROP IN BOTH EYES DAILY AT BEDTIME      polyvinyl alcohol (LIQUIFILM TEARS) 1.4 % ophthalmic solution Place 2 drops into both eyes as needed for Dry Eyes 1 Bottle 2    omeprazole (PRILOSEC) 20 MG delayed release capsule Take 1 capsule by mouth Daily 90 capsule 3    metoprolol tartrate (LOPRESSOR) 25 MG tablet Take 1 tablet by mouth 2 times daily 180 tablet 3    acetaminophen (TYLENOL) 325 MG tablet Take 650 mg by mouth every 6 hours as needed for Pain. No current facility-administered medications for this visit. ALLERGIES:      Allergies   Allergen Reactions    Bee Pollen Anaphylaxis    Iron Rash       SOCIAL HISTORY   Reviewed and no change from previous record. Zeynep Jackson  reports that she quit smoking about 12 years ago. She has a 25.00 pack-year smoking history. She has never used smokeless tobacco.    FAMILY HISTORY:    Reviewed and No change from previous visit    REVIEW OF SYSTEMS:    Review of Systems        OBJECTIVE      Physical exam           Vitals:    03/15/22 1525   BP: 130/84   Pulse: 85   SpO2: 98%   Weight: 156 lb (70.8 kg)   Height: 5' 7\" (1.702 m)         Physical Exam   Vitals:  /84   Pulse 85   Ht 5' 7\" (1.702 m)   Wt 156 lb (70.8 kg)   SpO2 98%   BMI 24.43 kg/m²                 Body mass index is 24.43 kg/m².      Vitals:    03/15/22 1525   BP: 130/84   Pulse: 85   SpO2: 98%   Weight: 156 lb (70.8 kg)   Height: 5' 7\" (1.702 m)       General -alert, well appearing, and in no distress  Skin - normal coloration and turgor, no rashes,no suspicious skin lesions noted  Eyes - pupils equal and reactive, extraocular eye movementsintact  Ears - bilateral TM's and external ear canals normal  Nose - normal and patent, no erythema, discharge or polyps  Mouth - mucous membranes are moist, pharynx normal without lesions  Neck - supple, no significant adenopathy  Lymphatics - no palpable lymphadenopathy, no hepatosplenomegaly    Chest - clear to auscultation, no wheezes, rales or rhonchi, symmetric air entry    Heart - normal rate, regular rhythm, no murmurs, rubs, clicks or gallops    Extremities - peripheral pulses normal, no pedal edema       Abdomen - soft, nontender, nondistended, no masses or organomegaly    Back - full range of motion, notenderness, palpable spasm or pain on motion    Neurological - alert, oriented, normal speech, no focal sensory or motor deficit  Musculoskeletal - no joint tenderness, deformity or swelling        Classical thoracic dermatome herpes zoster on the left side        DIAGNOSTICS / INSERTS / IMAGES    [x] Reviewed       Labs      URINE ANALYSIS: No results found for: LABURIN     CBC:  Lab Results   Component Value Date    WBC 6.0 12/16/2021    HGB 13.4 12/16/2021     12/16/2021        BMP:    Lab Results   Component Value Date     12/16/2021    K 4.3 12/16/2021     12/16/2021    CO2 25 12/16/2021    BUN 12 12/16/2021    CREATININE 0.64 12/16/2021    GLUCOSE 104 12/16/2021        No results found for: LDLC  Lab Results   Component Value Date    LABA1C 6.0 06/07/2019     No results found for: POCGLU   .     LIVER PROFILE:  Lab Results   Component Value Date    ALT 11 12/16/2021    AST 18 12/16/2021    PROT 7.8 12/16/2021    BILITOT 0.68 12/16/2021    BILIDIR 0.17 09/12/2014    LABALBU 3.9 12/16/2021          Results for orders placed or performed during the hospital encounter of 12/16/21   Urinalysis with Microscopic   Result Value Ref Range    Color, UA Yellow Yellow    Turbidity UA Turbid (A) Clear    Glucose, Ur NEGATIVE NEGATIVE    Bilirubin Urine NEGATIVE NEGATIVE    Ketones, Urine NEGATIVE NEGATIVE    Specific [place], UA 1.011 1.000 - 1.030    Urine Hgb TRACE (A) NEGATIVE    pH, UA 6.0 5.0 - 8.0    Protein, UA TRACE (A) NEGATIVE    Urobilinogen, Urine Normal Normal    Nitrite, Urine NEGATIVE NEGATIVE    Leukocyte Esterase, Urine LARGE (A) NEGATIVE    Urinalysis Comments NOT REPORTED     -          WBC, UA 50  /HPF    RBC, UA 2 TO 5 /HPF    Casts UA NOT REPORTED /LPF    Crystals, UA NOT REPORTED None /HPF    Epithelial Cells UA NOT REPORTED /HPF    Renal Epithelial, UA NOT REPORTED 0 /HPF    Bacteria, UA FEW (A) None    Mucus, UA NOT REPORTED None    Trichomonas, UA NOT REPORTED None    Amorphous, UA NOT REPORTED None    Other Observations UA NOT REPORTED NOT REQ.    Yeast, UA NOT REPORTED None   CBC Auto Differential   Result Value Ref Range    WBC 6.0 3.5 - 11.0 k/uL    RBC 4.60 4.0 - 5.2 m/uL    Hemoglobin 13.4 12.0 - 16.0 g/dL    Hematocrit 41.4 36 - 46 %    MCV 90.1 80 - 100 fL    MCH 29.1 26 - 34 pg    MCHC 32.3 31 - 37 g/dL    RDW 18.7 (H) 11.5 - 14.9 %    Platelets 897 444 - 403 k/uL    MPV 7.8 6.0 - 12.0 fL    NRBC Automated NOT REPORTED per 100 WBC    Differential Type NOT REPORTED     Seg Neutrophils 52 36 - 66 %    Lymphocytes 35 24 - 44 %    Monocytes 11 (H) 1 - 7 %    Eosinophils % 1 0 - 4 %    Basophils 1 0 - 2 %    Immature Granulocytes NOT REPORTED 0 %    Segs Absolute 3.10 1.3 - 9.1 k/uL    Absolute Lymph # 2.10 1.0 - 4.8 k/uL    Absolute Mono # 0.60 0.1 - 1.3 k/uL    Absolute Eos # 0.10 0.0 - 0.4 k/uL    Basophils Absolute 0.10 0.0 - 0.2 k/uL    Absolute Immature Granulocyte NOT REPORTED 0.00 - 0.30 k/uL    WBC Morphology NOT REPORTED     RBC Morphology NOT REPORTED     Platelet Estimate NOT REPORTED    Comprehensive Metabolic Panel   Result Value Ref Range    Glucose 104 (H) 70 - 99 mg/dL    BUN 12 8 - 23 mg/dL    CREATININE 0.64 0.50 - 0.90 mg/dL    Bun/Cre Ratio NOT REPORTED 9 - 20    Calcium 9.9 8.6 - 10.4 mg/dL    Sodium 139 135 - 144 mmol/L    Potassium 4.3 3.7 - 5.3 mmol/L    Chloride 105 98 - 107 mmol/L    CO2 25 20 - 31 mmol/L    Anion Gap 9 9 - 17 mmol/L    Alkaline Phosphatase 83 35 - 104 U/L    ALT 11 5 - 33 U/L    AST 18 <32 U/L    Total Bilirubin 0.68 0.3 - 1.2 mg/dL    Total Protein 7.8 6.4 - 8.3 g/dL    Albumin 3.9 3.5 - 5.2 g/dL    Albumin/Globulin Ratio NOT REPORTED 1.0 - 2.5    GFR Non-African American >60 >60 mL/min    GFR African American >60 >60 mL/min    GFR Comment          GFR Staging NOT REPORTED    Sedimentation rate, automated   Result Value Ref Range    Sed Rate 50 (H) 0 - 30 mm   TSH without Reflex   Result Value Ref Range    TSH 2.20 0.30 - 5.00 mIU/L   C-Reactive Protein   Result Value Ref Range    CRP <3.0 0.0 - 5.0 mg/L                     VITALS INCLUDING BMI REVIEWED WITH PATIENT  Labs reviewed as noted above   Discussed with patient        ASSESSMENT / Elvis Villanueva was seen today for insect bite. Diagnoses and all orders for this visit:    Herpes zoster without complication left   -     famciclovir (FAMVIR) 500 MG tablet; Take 1 tablet by mouth 3 times daily for 7 days               SALIENT  ACTIONS TODAYS VISIT       Today's office visit SALIENT ACTIONS    ---Started around [facility] done-            Discussed use, benefit, and side effects of prescribed medications. [x] yes    All patient questions answered. Patient voiced understanding. Patient given educational materials - see patient instructions  [] yes         There are no discontinued medications. No orders of the defined types were placed in this encounter. There are no Patient Instructions on file for this visit. Medication List          Accurate as of March 15, 2022  4:56 PM. If you have any questions, ask your nurse or doctor.             START taking these medications    famciclovir 500 MG tablet  Commonly known as: FAMVIR  Take 1 tablet by mouth 3 times daily for 7 days  Started by: Garfield Jonas MD        CONTINUE taking these medications    acetaminophen 325 MG tablet  Commonly known as: TYLENOL     furosemide 20 MG tablet  Commonly known as: LASIX  TAKE 1 TABLET BY MOUTH DAILY     latanoprost 0.005 % ophthalmic solution  Commonly known as: XALATAN     lisinopril 10 MG tablet  Commonly known as: PRINIVIL;ZESTRIL  TAKE ONE TABLET BY MOUTH ONCE A DAY     LORazepam 1 MG tablet  Commonly known as: ATIVAN  TAKE 1 TABLET BY MOUTH UP TO EVERY 8 HOURS AS NEEDED FOR ANXIETY (30-DAY SUPPLY)     * metoprolol tartrate 25 MG tablet  Commonly known as: LOPRESSOR  Take 1 tablet by mouth 2 times daily     * metoprolol tartrate 25 MG tablet  Commonly known as: LOPRESSOR  TAKE 1 TABLET BY MOUTH 2 TIMES DAILY     omeprazole 20 MG delayed release capsule  Commonly known as: PRILOSEC  Take 1 capsule by mouth Daily     polyvinyl alcohol 1.4 % ophthalmic solution  Commonly known as: LIQUIFILM TEARS  Place 2 drops into both eyes as needed for Dry Eyes     traMADol 50 MG tablet  Commonly known as: ULTRAM  Take 1 tablet by mouth every 6 hours as needed for Pain for up to 30 days. * This list has 2 medication(s) that are the same as other medications prescribed for you. Read the directions carefully, and ask your doctor or other care provider to review them with you. Where to Get Your Medications      These medications were sent to [place] [address], [address] [place] [address].    Phone: [phone] ·   famciclovir 500 MG tablet             FOLLOW UP  PLANS     Return in about 1 month (around 4/15/2022). MD GILSON Jones [address], [address] [place] [address].    Phone [phone]   Fax: [phone]  Answering Service: [phone]

## 2022-03-22 DIAGNOSIS — B02.9 HERPES ZOSTER WITHOUT COMPLICATION: ICD-10-CM

## 2022-03-22 RX ORDER — FAMCICLOVIR 500 MG/1
500 TABLET, FILM COATED ORAL 3 TIMES DAILY
Qty: 21 TABLET | Refills: 0 | Status: SHIPPED | OUTPATIENT
Start: 2022-03-22 | End: 2022-03-28 | Stop reason: SDUPTHER

## 2022-03-28 DIAGNOSIS — R52 CHRONIC GENERALIZED PAIN: ICD-10-CM

## 2022-03-28 DIAGNOSIS — I10 ESSENTIAL HYPERTENSION: ICD-10-CM

## 2022-03-28 DIAGNOSIS — F41.9 ANXIETY: ICD-10-CM

## 2022-03-28 DIAGNOSIS — B02.9 HERPES ZOSTER WITHOUT COMPLICATION: ICD-10-CM

## 2022-03-28 DIAGNOSIS — G89.29 CHRONIC GENERALIZED PAIN: ICD-10-CM

## 2022-03-28 RX ORDER — FAMCICLOVIR 500 MG/1
500 TABLET, FILM COATED ORAL 3 TIMES DAILY
Qty: 21 TABLET | Refills: 0 | Status: SHIPPED | OUTPATIENT
Start: 2022-03-28 | End: 2022-04-12

## 2022-03-28 RX ORDER — LORAZEPAM 1 MG/1
TABLET ORAL
Qty: 90 TABLET | Refills: 0 | Status: SHIPPED | OUTPATIENT
Start: 2022-03-28 | End: 2022-04-25 | Stop reason: SDUPTHER

## 2022-03-28 RX ORDER — TRAMADOL HYDROCHLORIDE 50 MG/1
50 TABLET ORAL EVERY 6 HOURS PRN
Qty: 60 TABLET | Refills: 0 | Status: SHIPPED | OUTPATIENT
Start: 2022-03-28 | End: 2022-04-25 | Stop reason: SDUPTHER

## 2022-03-28 NOTE — TELEPHONE ENCOUNTER
----- Message from Segundo Roman sent at 3/26/2022  9:04 AM EDT -----  Subject: Medication Problem    QUESTIONS  Name of Medication? famciclovir (FAMVIR) 500 MG tablet  Patient-reported dosage and instructions? Take 1 tablet by mouth 3 times   daily for 7 days  What question or problem do you have with the medication? patient states   that since taking the medication she is now experiening diarrhea  Preferred Pharmacy? [place], [address]  Pharmacy phone number (if available)? [phone]  Additional Information for Provider? patient is requesting to have the   medication that she was prescibed called valacyclovir hcl 500 mg take 3 a   day   ---------------------------------------------------------------------------  --------------  CALL BACK INFO  What is the best way for the office to contact you? OK to leave message on   voicemail  Preferred Call Back Phone Number? [phone]  ---------------------------------------------------------------------------  --------------  SCRIPT ANSWERS  Relationship to Patient?  Self

## 2022-03-28 NOTE — TELEPHONE ENCOUNTER
Patient states she has been experiencing diarrhea after taking Famciclovir. She is requesting an Rx for Valacyclovir.      Please advise

## 2022-04-12 DIAGNOSIS — B02.9 HERPES ZOSTER WITHOUT COMPLICATION: ICD-10-CM

## 2022-04-12 RX ORDER — FAMCICLOVIR 500 MG/1
500 TABLET, FILM COATED ORAL 3 TIMES DAILY
Qty: 21 TABLET | Refills: 0 | Status: SHIPPED | OUTPATIENT
Start: 2022-04-12 | End: 2022-04-19

## 2022-04-15 DIAGNOSIS — I10 ESSENTIAL HYPERTENSION: ICD-10-CM

## 2022-04-18 DIAGNOSIS — B02.9 HERPES ZOSTER WITHOUT COMPLICATION: ICD-10-CM

## 2022-04-18 RX ORDER — FAMCICLOVIR 500 MG/1
500 TABLET, FILM COATED ORAL 3 TIMES DAILY
Qty: 21 TABLET | Refills: 0 | OUTPATIENT
Start: 2022-04-18 | End: 2022-04-25

## 2022-04-18 RX ORDER — OMEPRAZOLE 20 MG/1
20 CAPSULE, DELAYED RELEASE ORAL DAILY
Qty: 90 CAPSULE | Refills: 3 | Status: SHIPPED | OUTPATIENT
Start: 2022-04-18 | End: 2022-08-01 | Stop reason: SDUPTHER

## 2022-04-25 DIAGNOSIS — I10 ESSENTIAL HYPERTENSION: ICD-10-CM

## 2022-04-25 DIAGNOSIS — G89.29 CHRONIC GENERALIZED PAIN: ICD-10-CM

## 2022-04-25 DIAGNOSIS — F41.9 ANXIETY: ICD-10-CM

## 2022-04-25 DIAGNOSIS — R52 CHRONIC GENERALIZED PAIN: ICD-10-CM

## 2022-04-25 RX ORDER — LORAZEPAM 1 MG/1
TABLET ORAL
Qty: 90 TABLET | Refills: 0 | Status: SHIPPED | OUTPATIENT
Start: 2022-04-25 | End: 2022-06-02 | Stop reason: SDUPTHER

## 2022-04-25 RX ORDER — TRAMADOL HYDROCHLORIDE 50 MG/1
50 TABLET ORAL EVERY 6 HOURS PRN
Qty: 60 TABLET | Refills: 0 | Status: SHIPPED | OUTPATIENT
Start: 2022-04-25 | End: 2022-06-02 | Stop reason: SDUPTHER

## 2022-04-25 NOTE — TELEPHONE ENCOUNTER
Medication Requested: Tramadol and Lorazepam     Last visit: 3/15/2022  Next visit: 2022  Last refill: 3/28/22    Med contract on file:  [x] yes   [] no    Last urine drug screen:  11/15/19  Consistent with medication(s):    [] yes   [] no    Last OARRS ran: unknown  Quantity of medication remainin     Who will be picking rx up:     Pharmacy if escribed: [address] [place] [address],[address]

## 2022-04-25 NOTE — TELEPHONE ENCOUNTER
Medication Requested: Lorazepam or Tramadol    Last visit: 2017  Patient states she will call back later to schedule appt informed her that it's been a year since she has been in the office and she will need to schedule in order to refill scripts but patient insisted I still try. Next visit: Visit date not found  Last refill: 2018    Med contract on file:  [x] yes   [] no    Last urine drug screen:  2017  Consistent with medication(s):    [x] yes   [] no    Last OARRS ran:  ?   Quantity of medication remainin da    Who will be picking rx up: self or daughter [place] if escribed: [employer] / [address] Not applicable

## 2022-04-27 DIAGNOSIS — I10 ESSENTIAL HYPERTENSION: ICD-10-CM

## 2022-04-27 DIAGNOSIS — F41.9 ANXIETY: ICD-10-CM

## 2022-04-27 DIAGNOSIS — R52 CHRONIC GENERALIZED PAIN: ICD-10-CM

## 2022-04-27 DIAGNOSIS — G89.29 CHRONIC GENERALIZED PAIN: ICD-10-CM

## 2022-04-27 RX ORDER — LORAZEPAM 1 MG/1
TABLET ORAL
Qty: 90 TABLET | OUTPATIENT
Start: 2022-04-27

## 2022-04-27 RX ORDER — TRAMADOL HYDROCHLORIDE 50 MG/1
50 TABLET ORAL EVERY 6 HOURS PRN
Qty: 60 TABLET | OUTPATIENT
Start: 2022-04-27 | End: 2022-05-27

## 2022-05-17 ENCOUNTER — NURSE TRIAGE (OUTPATIENT)
Dept: OTHER | Facility: CLINIC | Age: 82
End: 2022-05-17

## 2022-05-31 DIAGNOSIS — R52 CHRONIC GENERALIZED PAIN: ICD-10-CM

## 2022-05-31 DIAGNOSIS — G89.29 CHRONIC GENERALIZED PAIN: ICD-10-CM

## 2022-05-31 DIAGNOSIS — I10 ESSENTIAL HYPERTENSION: ICD-10-CM

## 2022-05-31 DIAGNOSIS — F41.9 ANXIETY: ICD-10-CM

## 2022-05-31 NOTE — TELEPHONE ENCOUNTER
Medication Requested: TRAMADOL    Last visit: 3/15/22  Next visit: Visit date not found  Last refill: 4/25/22    Med contract on file:  [x] yes   [] no    Last urine drug screen: 11/25/19  Consistent with medication(s):    [x] yes   [] no    Last OARRS ran: unk    Quantity of medication remaining: unk    Who will be picking rx up: self    Pharmacy if escribed: [address]

## 2022-06-02 RX ORDER — LORAZEPAM 1 MG/1
TABLET ORAL
Qty: 90 TABLET | Refills: 0 | Status: SHIPPED | OUTPATIENT
Start: 2022-06-02 | End: 2022-06-28

## 2022-06-02 RX ORDER — TRAMADOL HYDROCHLORIDE 50 MG/1
50 TABLET ORAL EVERY 6 HOURS PRN
Qty: 60 TABLET | Refills: 0 | Status: SHIPPED | OUTPATIENT
Start: 2022-06-02 | End: 2022-06-28

## 2022-06-27 ENCOUNTER — TELEPHONE (OUTPATIENT)
Dept: INTERNAL MEDICINE CLINIC | Age: 82
End: 2022-06-27

## 2022-06-27 DIAGNOSIS — R52 CHRONIC GENERALIZED PAIN: ICD-10-CM

## 2022-06-27 DIAGNOSIS — F41.9 ANXIETY: ICD-10-CM

## 2022-06-27 DIAGNOSIS — G89.29 CHRONIC GENERALIZED PAIN: ICD-10-CM

## 2022-06-27 DIAGNOSIS — I10 ESSENTIAL HYPERTENSION: ICD-10-CM

## 2022-06-27 NOTE — TELEPHONE ENCOUNTER
Dr. Castillo Osborne is out of the office this week, please advise if a letter can be provided to patient for a handicap placard.

## 2022-06-28 DIAGNOSIS — R52 CHRONIC GENERALIZED PAIN: ICD-10-CM

## 2022-06-28 DIAGNOSIS — G89.29 CHRONIC GENERALIZED PAIN: ICD-10-CM

## 2022-06-28 DIAGNOSIS — F41.9 ANXIETY: ICD-10-CM

## 2022-06-28 DIAGNOSIS — I10 ESSENTIAL HYPERTENSION: ICD-10-CM

## 2022-06-28 RX ORDER — TRAMADOL HYDROCHLORIDE 50 MG/1
TABLET ORAL
Qty: 60 TABLET | Refills: 0 | Status: SHIPPED | OUTPATIENT
Start: 2022-06-28 | End: 2022-06-28

## 2022-06-28 RX ORDER — LORAZEPAM 1 MG/1
TABLET ORAL
Qty: 90 TABLET | Refills: 0 | Status: SHIPPED | OUTPATIENT
Start: 2022-06-28 | End: 2022-08-01 | Stop reason: SDUPTHER

## 2022-06-28 RX ORDER — LORAZEPAM 1 MG/1
TABLET ORAL
Qty: 90 TABLET | Refills: 2 | Status: SHIPPED | OUTPATIENT
Start: 2022-06-28 | End: 2022-06-28

## 2022-06-28 RX ORDER — LISINOPRIL 10 MG/1
TABLET ORAL
Qty: 90 TABLET | Refills: 3 | Status: SHIPPED | OUTPATIENT
Start: 2022-06-28 | End: 2022-08-01 | Stop reason: SDUPTHER

## 2022-06-28 RX ORDER — TRAMADOL HYDROCHLORIDE 50 MG/1
50 TABLET ORAL EVERY 6 HOURS PRN
Qty: 60 TABLET | Refills: 0 | Status: SHIPPED | OUTPATIENT
Start: 2022-06-28 | End: 2022-08-01 | Stop reason: SDUPTHER

## 2022-07-27 DIAGNOSIS — R52 CHRONIC GENERALIZED PAIN: ICD-10-CM

## 2022-07-27 DIAGNOSIS — I27.20 PULMONARY HTN (HCC): ICD-10-CM

## 2022-07-27 DIAGNOSIS — G89.29 CHRONIC GENERALIZED PAIN: ICD-10-CM

## 2022-07-27 DIAGNOSIS — I10 ESSENTIAL HYPERTENSION: ICD-10-CM

## 2022-07-27 DIAGNOSIS — F41.9 ANXIETY: ICD-10-CM

## 2022-07-29 NOTE — TELEPHONE ENCOUNTER
Tramadol start date- 6/28/22 end date- 7/28/22  LOV- 3/15/22 with Dr. Suha Polkkshanice  No upcoming appointments

## 2022-08-01 RX ORDER — LORAZEPAM 1 MG/1
TABLET ORAL
Qty: 90 TABLET | Refills: 0 | Status: SHIPPED | OUTPATIENT
Start: 2022-08-01 | End: 2022-09-13 | Stop reason: SDUPTHER

## 2022-08-01 RX ORDER — LISINOPRIL 10 MG/1
TABLET ORAL
Qty: 90 TABLET | Refills: 3 | Status: SHIPPED | OUTPATIENT
Start: 2022-08-01

## 2022-08-01 RX ORDER — TRAMADOL HYDROCHLORIDE 50 MG/1
50 TABLET ORAL EVERY 6 HOURS PRN
Qty: 60 TABLET | Refills: 0 | Status: SHIPPED | OUTPATIENT
Start: 2022-08-01 | End: 2022-08-22

## 2022-08-01 RX ORDER — OMEPRAZOLE 20 MG/1
20 CAPSULE, DELAYED RELEASE ORAL DAILY
Qty: 90 CAPSULE | Refills: 3 | Status: SHIPPED | OUTPATIENT
Start: 2022-08-01

## 2022-08-01 RX ORDER — FUROSEMIDE 20 MG/1
20 TABLET ORAL DAILY
Qty: 90 TABLET | Refills: 3 | Status: SHIPPED | OUTPATIENT
Start: 2022-08-01

## 2022-08-01 NOTE — TELEPHONE ENCOUNTER
Patient is calling again regarding her medication refill. She is really in need of her Tramadol. Please advise. She would like someone to let her know what the problem is.

## 2022-08-20 DIAGNOSIS — R52 CHRONIC GENERALIZED PAIN: ICD-10-CM

## 2022-08-20 DIAGNOSIS — G89.29 CHRONIC GENERALIZED PAIN: ICD-10-CM

## 2022-08-22 RX ORDER — TRAMADOL HYDROCHLORIDE 50 MG/1
50 TABLET ORAL 2 TIMES DAILY PRN
Qty: 60 TABLET | Refills: 0 | Status: SHIPPED | OUTPATIENT
Start: 2022-08-22 | End: 2022-09-19 | Stop reason: SDUPTHER

## 2022-09-13 DIAGNOSIS — F41.9 ANXIETY: ICD-10-CM

## 2022-09-13 DIAGNOSIS — I10 ESSENTIAL HYPERTENSION: ICD-10-CM

## 2022-09-13 RX ORDER — LORAZEPAM 1 MG/1
TABLET ORAL
Qty: 90 TABLET | Refills: 0 | Status: SHIPPED | OUTPATIENT
Start: 2022-09-13 | End: 2022-10-06

## 2022-09-13 NOTE — TELEPHONE ENCOUNTER
Medication Requested: Ativan     Last visit: 03/15/2022  Next visit: 2022  Last refill: 2022    Med contract on file:  [] yes   [] no    Last urine drug screen: 11/15/2019  Consistent with medication(s):    [x] yes   [] no    Last OARRS ran: Unknkown    Quantity of medication remainin    Who will be picking rx up: Delivery     Pharmacy if escribed: [facility].

## 2022-09-19 DIAGNOSIS — R52 CHRONIC GENERALIZED PAIN: ICD-10-CM

## 2022-09-19 DIAGNOSIS — G89.29 CHRONIC GENERALIZED PAIN: ICD-10-CM

## 2022-09-19 RX ORDER — TRAMADOL HYDROCHLORIDE 50 MG/1
50 TABLET ORAL 2 TIMES DAILY PRN
Qty: 60 TABLET | Refills: 0 | Status: SHIPPED | OUTPATIENT
Start: 2022-09-19 | End: 2022-10-18

## 2022-10-14 ENCOUNTER — TELEPHONE (OUTPATIENT)
Dept: INTERNAL MEDICINE CLINIC | Age: 82
End: 2022-10-14

## 2022-10-18 DIAGNOSIS — G89.29 CHRONIC GENERALIZED PAIN: ICD-10-CM

## 2022-10-18 DIAGNOSIS — R52 CHRONIC GENERALIZED PAIN: ICD-10-CM

## 2022-10-18 RX ORDER — TRAMADOL HYDROCHLORIDE 50 MG/1
50 TABLET ORAL 2 TIMES DAILY PRN
Qty: 60 TABLET | Refills: 0 | Status: SHIPPED | OUTPATIENT
Start: 2022-10-18 | End: 2022-11-17

## 2022-11-14 DIAGNOSIS — I10 ESSENTIAL HYPERTENSION: ICD-10-CM

## 2022-11-14 DIAGNOSIS — R52 CHRONIC GENERALIZED PAIN: ICD-10-CM

## 2022-11-14 DIAGNOSIS — G89.29 CHRONIC GENERALIZED PAIN: ICD-10-CM

## 2022-11-14 DIAGNOSIS — F41.9 ANXIETY: ICD-10-CM

## 2022-11-14 RX ORDER — TRAMADOL HYDROCHLORIDE 50 MG/1
TABLET ORAL
Qty: 60 TABLET | Refills: 0 | OUTPATIENT
Start: 2022-11-14

## 2022-11-14 RX ORDER — LORAZEPAM 1 MG/1
TABLET ORAL
Qty: 90 TABLET | Refills: 0 | OUTPATIENT
Start: 2022-11-14

## 2022-11-16 DIAGNOSIS — R52 CHRONIC GENERALIZED PAIN: ICD-10-CM

## 2022-11-16 DIAGNOSIS — G89.29 CHRONIC GENERALIZED PAIN: ICD-10-CM

## 2022-11-16 RX ORDER — TRAMADOL HYDROCHLORIDE 50 MG/1
50 TABLET ORAL 2 TIMES DAILY PRN
Qty: 60 TABLET | Refills: 0 | OUTPATIENT
Start: 2022-11-16 | End: 2022-12-16

## 2022-11-18 DIAGNOSIS — G89.29 CHRONIC GENERALIZED PAIN: ICD-10-CM

## 2022-11-18 DIAGNOSIS — R52 CHRONIC GENERALIZED PAIN: ICD-10-CM

## 2022-11-21 DIAGNOSIS — G89.29 CHRONIC GENERALIZED PAIN: ICD-10-CM

## 2022-11-21 DIAGNOSIS — I10 ESSENTIAL HYPERTENSION: ICD-10-CM

## 2022-11-21 DIAGNOSIS — F41.9 ANXIETY: ICD-10-CM

## 2022-11-21 DIAGNOSIS — R52 CHRONIC GENERALIZED PAIN: ICD-10-CM

## 2022-11-21 RX ORDER — TRAMADOL HYDROCHLORIDE 50 MG/1
50 TABLET ORAL 2 TIMES DAILY PRN
Qty: 60 TABLET | Refills: 0 | Status: SHIPPED | OUTPATIENT
Start: 2022-11-21 | End: 2022-12-21

## 2022-11-21 RX ORDER — NALOXONE HYDROCHLORIDE 2 MG/.4ML
2 INJECTION, SOLUTION INTRAMUSCULAR; SUBCUTANEOUS
Qty: 0.4 ML | Refills: 0 | Status: SHIPPED | OUTPATIENT
Start: 2022-11-21 | End: 2022-11-21

## 2022-11-21 RX ORDER — LORAZEPAM 1 MG/1
1 TABLET ORAL 3 TIMES DAILY PRN
Qty: 90 TABLET | Refills: 0 | Status: SHIPPED | OUTPATIENT
Start: 2022-11-21 | End: 2022-12-21

## 2022-11-22 RX ORDER — TRAMADOL HYDROCHLORIDE 50 MG/1
50 TABLET ORAL 2 TIMES DAILY PRN
Qty: 60 TABLET | Refills: 0 | Status: SHIPPED | OUTPATIENT
Start: 2022-11-22 | End: 2022-12-22

## 2022-12-14 ENCOUNTER — TELEPHONE (OUTPATIENT)
Dept: INTERNAL MEDICINE CLINIC | Age: 82
End: 2022-12-14

## 2022-12-19 DIAGNOSIS — I10 ESSENTIAL HYPERTENSION: ICD-10-CM

## 2022-12-19 DIAGNOSIS — G89.29 CHRONIC GENERALIZED PAIN: ICD-10-CM

## 2022-12-19 DIAGNOSIS — R52 CHRONIC GENERALIZED PAIN: ICD-10-CM

## 2022-12-19 RX ORDER — TRAMADOL HYDROCHLORIDE 50 MG/1
50 TABLET ORAL 2 TIMES DAILY PRN
Qty: 60 TABLET | Refills: 0 | OUTPATIENT
Start: 2022-12-19 | End: 2023-01-18

## 2022-12-19 RX ORDER — OMEPRAZOLE 20 MG/1
20 CAPSULE, DELAYED RELEASE ORAL DAILY
Qty: 90 CAPSULE | Refills: 3 | OUTPATIENT
Start: 2022-12-19

## 2022-12-20 ENCOUNTER — TELEPHONE (OUTPATIENT)
Dept: INTERNAL MEDICINE CLINIC | Age: 82
End: 2022-12-20

## 2022-12-20 NOTE — TELEPHONE ENCOUNTER
Patient returned call and scheduled appointment for 1/10/2023 , would like to know if her tramadol can be refilled until next appointment ?

## 2022-12-20 NOTE — TELEPHONE ENCOUNTER
Naloxone HCl 2 MG/0.4ML Vera Brody [[id number]]  ENDED    Order Details  Dose: 2 mg Route: IntraMUSCular Frequency: ONCE PRN for overdose   Dispense Quantity: 0.4 mL Refills: 0          Sig: Inject 2 mg into the muscle once as needed (overdose)         Start Date: 11/21/22 End Date: 11/21/22 after 1 doses   Written Date: 11/21/22 Expiration Date: 11/21/23   Providers    Authorizing Provider: Nithya Barajas MD NPI: [id number]   Ordering User:  Steven Guerra [facility] [address] [facility] [place]   [address] [place] [address]   Phone:  [phone]  Fax:  [phone]       Pharmacist states that this medication is not available by injector but the Narcan inhalation prescription is if Dr would like to change prescription , please advise

## 2022-12-22 DIAGNOSIS — R52 CHRONIC GENERALIZED PAIN: ICD-10-CM

## 2022-12-22 DIAGNOSIS — G89.29 CHRONIC GENERALIZED PAIN: ICD-10-CM

## 2022-12-22 RX ORDER — TRAMADOL HYDROCHLORIDE 50 MG/1
TABLET ORAL
Qty: 60 TABLET | Refills: 0 | OUTPATIENT
Start: 2022-12-22

## 2022-12-23 RX ORDER — TRAMADOL HYDROCHLORIDE 50 MG/1
50 TABLET ORAL 2 TIMES DAILY PRN
Qty: 60 TABLET | Refills: 0 | Status: SHIPPED | OUTPATIENT
Start: 2022-12-23 | End: 2023-01-22

## 2023-01-10 ENCOUNTER — OFFICE VISIT (OUTPATIENT)
Dept: INTERNAL MEDICINE CLINIC | Age: 83
End: 2023-01-10
Payer: MEDICARE

## 2023-01-10 ENCOUNTER — HOSPITAL ENCOUNTER (OUTPATIENT)
Age: 83
Setting detail: SPECIMEN
Discharge: HOME | End: 2023-01-10

## 2023-01-10 VITALS — DIASTOLIC BLOOD PRESSURE: 80 MMHG | WEIGHT: 147 LBS | SYSTOLIC BLOOD PRESSURE: 120 MMHG | BODY MASS INDEX: 23.02 KG/M2

## 2023-01-10 DIAGNOSIS — I27.20 PULMONARY HTN (HCC): ICD-10-CM

## 2023-01-10 DIAGNOSIS — I10 PRIMARY HYPERTENSION: ICD-10-CM

## 2023-01-10 DIAGNOSIS — I10 PRIMARY HYPERTENSION: Primary | ICD-10-CM

## 2023-01-10 DIAGNOSIS — K43.9 VENTRAL HERNIA WITHOUT OBSTRUCTION OR GANGRENE: ICD-10-CM

## 2023-01-10 DIAGNOSIS — G89.4 CHRONIC PAIN SYNDROME: ICD-10-CM

## 2023-01-10 DIAGNOSIS — I48.21 PERMANENT ATRIAL FIBRILLATION (HCC): ICD-10-CM

## 2023-01-10 DIAGNOSIS — F41.9 ANXIETY: ICD-10-CM

## 2023-01-10 LAB
ABSOLUTE EOS #: 0.09 K/UL (ref 0–0.44)
ABSOLUTE IMMATURE GRANULOCYTE: <0.03 K/UL (ref 0–0.3)
ABSOLUTE LYMPH #: 2.37 K/UL (ref 1.1–3.7)
ABSOLUTE MONO #: 0.76 K/UL (ref 0.1–1.2)
BACTERIA: ABNORMAL
BASOPHILS # BLD: 1 % (ref 0–2)
BASOPHILS ABSOLUTE: 0.08 K/UL (ref 0–0.2)
BILIRUBIN URINE: NEGATIVE
CASTS UA: ABNORMAL /LPF (ref 0–8)
COLOR: YELLOW
EOSINOPHILS RELATIVE PERCENT: 1 % (ref 1–4)
EPITHELIAL CELLS UA: ABNORMAL /HPF (ref 0–5)
GLUCOSE URINE: NEGATIVE
HCT VFR BLD CALC: 46 % (ref 36.3–47.1)
HEMOGLOBIN: 14.5 G/DL (ref 11.9–15.1)
IMMATURE GRANULOCYTES: 0 %
KETONES, URINE: NEGATIVE
LEUKOCYTE ESTERASE, URINE: ABNORMAL
LYMPHOCYTES # BLD: 35 % (ref 24–43)
MCH RBC QN AUTO: 33.1 PG (ref 25.2–33.5)
MCHC RBC AUTO-ENTMCNC: 31.5 G/DL (ref 28.4–34.8)
MCV RBC AUTO: 105 FL (ref 82.6–102.9)
MONOCYTES # BLD: 11 % (ref 3–12)
NITRITE, URINE: NEGATIVE
NRBC AUTOMATED: 0 PER 100 WBC
PDW BLD-RTO: 13.2 % (ref 11.8–14.4)
PH UA: 6.5 (ref 5–8)
PLATELET # BLD: 239 K/UL (ref 138–453)
PMV BLD AUTO: 10.1 FL (ref 8.1–13.5)
PROTEIN UA: ABNORMAL
RBC # BLD: 4.38 M/UL (ref 3.95–5.11)
RBC # BLD: ABNORMAL 10*6/UL
RBC UA: ABNORMAL /HPF (ref 0–4)
SEDIMENTATION RATE, ERYTHROCYTE: 51 MM/HR (ref 0–30)
SEG NEUTROPHILS: 52 % (ref 36–65)
SEGMENTED NEUTROPHILS ABSOLUTE COUNT: 3.37 K/UL (ref 1.5–8.1)
SPECIFIC GRAVITY UA: 1.02 (ref 1–1.03)
TURBIDITY: ABNORMAL
URINE HGB: ABNORMAL
UROBILINOGEN, URINE: NORMAL
WBC # BLD: 6.7 K/UL (ref 3.5–11.3)
WBC UA: ABNORMAL /HPF (ref 0–5)

## 2023-01-10 PROCEDURE — 3079F DIAST BP 80-89 MM HG: CPT | Performed by: INTERNAL MEDICINE

## 2023-01-10 PROCEDURE — 3074F SYST BP LT 130 MM HG: CPT | Performed by: INTERNAL MEDICINE

## 2023-01-10 PROCEDURE — 99214 OFFICE O/P EST MOD 30 MIN: CPT | Performed by: INTERNAL MEDICINE

## 2023-01-10 PROCEDURE — 1123F ACP DISCUSS/DSCN MKR DOCD: CPT | Performed by: INTERNAL MEDICINE

## 2023-01-10 SDOH — ECONOMIC STABILITY: FOOD INSECURITY: WITHIN THE PAST 12 MONTHS, YOU WORRIED THAT YOUR FOOD WOULD RUN OUT BEFORE YOU GOT MONEY TO BUY MORE.: NEVER TRUE

## 2023-01-10 SDOH — ECONOMIC STABILITY: FOOD INSECURITY: WITHIN THE PAST 12 MONTHS, THE FOOD YOU BOUGHT JUST DIDN'T LAST AND YOU DIDN'T HAVE MONEY TO GET MORE.: NEVER TRUE

## 2023-01-10 ASSESSMENT — PATIENT HEALTH QUESTIONNAIRE - PHQ9
SUM OF ALL RESPONSES TO PHQ QUESTIONS 1-9: 0
2. FEELING DOWN, DEPRESSED OR HOPELESS: 0
1. LITTLE INTEREST OR PLEASURE IN DOING THINGS: 0
SUM OF ALL RESPONSES TO PHQ QUESTIONS 1-9: 0
SUM OF ALL RESPONSES TO PHQ9 QUESTIONS 1 & 2: 0

## 2023-01-10 ASSESSMENT — ENCOUNTER SYMPTOMS
ORTHOPNEA: 0
GASTROINTESTINAL NEGATIVE: 1
ALLERGIC/IMMUNOLOGIC NEGATIVE: 1
BLURRED VISION: 0
SHORTNESS OF BREATH: 0

## 2023-01-10 ASSESSMENT — SOCIAL DETERMINANTS OF HEALTH (SDOH): HOW HARD IS IT FOR YOU TO PAY FOR THE VERY BASICS LIKE FOOD, HOUSING, MEDICAL CARE, AND HEATING?: NOT HARD AT ALL

## 2023-01-10 NOTE — PROGRESS NOTES
Subjective:      Patient ID: Ebenezer Hahn is a [de-identified] y.o. female. She is asymptomatic except for chronic backache which is moderate  Today on January 10, 2022 she is concerned about her left-sided ventral hernia has no other complaints    Hypertension  This is a chronic problem. The current episode started more than 1 year ago. The problem is unchanged. Associated symptoms include anxiety. Pertinent negatives include no blurred vision, chest pain, headaches, malaise/fatigue, neck pain, orthopnea, palpitations, peripheral edema, PND, shortness of breath or sweats. There are no associated agents to hypertension. Risk factors for coronary artery disease include dyslipidemia. There are no compliance problems. Hypertensive end-organ damage includes left ventricular hypertrophy. There is no history of chronic renal disease or a hypertension causing med. Review of Systems   Constitutional:  Negative for fatigue and malaise/fatigue. HENT: Negative. Eyes:  Negative for blurred vision. Respiratory:  Negative for shortness of breath. Cardiovascular: Negative. Negative for chest pain, palpitations, orthopnea and PND. Gastrointestinal: Negative. Endocrine: Negative. Musculoskeletal: Negative. Negative for neck pain. Skin: Negative. Allergic/Immunologic: Negative. Neurological: Negative. Negative for headaches. Hematological: Negative. Psychiatric/Behavioral:  The patient is nervous/anxious. Objective:   Physical Exam  Constitutional:       Appearance: Normal appearance. HENT:      Head: Normocephalic and atraumatic. Nose: No congestion or rhinorrhea. Cardiovascular:      Rate and Rhythm: Rhythm irregular. Heart sounds: No murmur heard. No gallop. Comments: Apical HR 84/ minute   Abdominal:      General: There is no distension. Palpations: There is no mass. Tenderness: There is no abdominal tenderness. There is no guarding.       Hernia: A hernia is present. Comments: He has a left lower quadrant ventral hernia and also femoral hernia   Musculoskeletal:      Cervical back: Normal range of motion. No rigidity. No muscular tenderness. Right lower leg: No edema. Comments: Ankle edema    Skin:     General: Skin is warm and dry. Comments: Left orbit small basal cell carcinoma she has a rash on her forehead which appears to be eczema. Neurological:      Mental Status: She is alert. Assessment / Plan:       Diagnosis Orders   1. Primary hypertension -controlled       2. Pulmonary HTN ([address]        3. Permanent atrial fibrillation ([address]        4. Ventral hernia without obstruction or gangrene = he was advised to tolerate she is not a candidate for surgical repair and there is no evidence of obstruction at this time       5. Chronic pain syndrome        6. Anxiety = stable       Return for Follow Up. Orders Placed This Encounter   Procedures    CBC with Auto Differential     Standing Status:   Future     Standing Expiration Date:   1/10/2024    Comprehensive Metabolic Panel     Standing Status:   Future     Standing Expiration Date:   1/10/2024    Urinalysis with Microscopic     Standing Status:   Future     Standing Expiration Date:   1/10/2024     Order Specific Question:   SPECIFY(EX-CATH,MIDSTREAM,CYSTO,ETC)? Answer:   clean catch    Sedimentation rate, automated     Standing Status:   Future     Standing Expiration Date:   1/10/2024    TSH     Standing Status:   Future     Standing Expiration Date:   1/10/2024     No orders of the defined types were placed in this encounter. Visit Information    Have you changed or started any medications since your last visit including any over-the-counter medicines, vitamins, or herbal medicines? no   Are you having any side effects from any of your medications? -  no  Have you stopped taking any of your medications?  Is so, why? -  no    Have you seen any other physician or provider since your last visit? No  Have you had any other diagnostic tests since your last visit? No  Have you been seen in the emergency room and/or had an admission to a hospital since we last saw you? No  Have you had your routine dental cleaning in the past 6 months? no    Have you activated your [employer] account? If not, what are your barriers?  Yes     Patient Care Team:  Colonel Archana MD as PCP - Paula Huddleston MD as PCP - [facility] Empaneled Provider  Sushma Alexander MD as Consulting Physician (Gastroenterology)    Medical History Review  Past Medical, Family, and Social History reviewed and does not contribute to the patient presenting condition    Health Maintenance   Topic Date Due    DTaP/Tdap/Td vaccine (1 - Tdap) Never done    Low dose CT lung screening  Never done    DEXA (modify frequency per FRAX score)  Never done    Annual Wellness Visit (AWV)  12/15/2021    COVID-19 Vaccine (4 - Booster for Pfizer series) 02/01/2022    Depression Screen  06/15/2022    Flu vaccine (1) 08/01/2022    Shingles vaccine (2 of 2) 09/09/2022    Pneumococcal 65+ years Vaccine  Completed    Hepatitis A vaccine  Aged Out    Hib vaccine  Aged Out    Meningococcal (ACWY) vaccine  Aged Out

## 2023-01-11 LAB
ALBUMIN SERPL-MCNC: 3.8 G/DL (ref 3.5–5.2)
ALBUMIN/GLOBULIN RATIO: 1.1 (ref 1–2.5)
ALP BLD-CCNC: 79 U/L (ref 35–104)
ALT SERPL-CCNC: 16 U/L (ref 5–33)
ANION GAP SERPL CALCULATED.3IONS-SCNC: 13 MMOL/L (ref 9–17)
AST SERPL-CCNC: 24 U/L
BILIRUB SERPL-MCNC: 0.4 MG/DL (ref 0.3–1.2)
BUN BLDV-MCNC: 19 MG/DL (ref 8–23)
CALCIUM SERPL-MCNC: 9.5 MG/DL (ref 8.6–10.4)
CHLORIDE BLD-SCNC: 102 MMOL/L (ref 98–107)
CO2: 26 MMOL/L (ref 20–31)
CREAT SERPL-MCNC: 0.64 MG/DL (ref 0.5–0.9)
GFR SERPL CREATININE-BSD FRML MDRD: >60 ML/MIN/1.73M2
GLUCOSE BLD-MCNC: 74 MG/DL (ref 70–99)
POTASSIUM SERPL-SCNC: 4 MMOL/L (ref 3.7–5.3)
SODIUM BLD-SCNC: 141 MMOL/L (ref 135–144)
TOTAL PROTEIN: 7.2 G/DL (ref 6.4–8.3)
TSH SERPL DL<=0.05 MIU/L-ACNC: 2.65 UIU/ML (ref 0.3–5)

## 2023-01-16 DIAGNOSIS — R52 CHRONIC GENERALIZED PAIN: ICD-10-CM

## 2023-01-16 DIAGNOSIS — I10 ESSENTIAL HYPERTENSION: ICD-10-CM

## 2023-01-16 DIAGNOSIS — G89.29 CHRONIC GENERALIZED PAIN: ICD-10-CM

## 2023-01-16 DIAGNOSIS — F41.9 ANXIETY: ICD-10-CM

## 2023-01-17 RX ORDER — LORAZEPAM 1 MG/1
1 TABLET ORAL 3 TIMES DAILY PRN
Qty: 270 TABLET | Refills: 0 | Status: SHIPPED | OUTPATIENT
Start: 2023-01-17 | End: 2023-04-17

## 2023-01-17 RX ORDER — TRAMADOL HYDROCHLORIDE 50 MG/1
50 TABLET ORAL 2 TIMES DAILY PRN
Qty: 90 TABLET | Refills: 0 | Status: SHIPPED | OUTPATIENT
Start: 2023-01-17 | End: 2023-03-03

## 2023-02-09 ENCOUNTER — TELEPHONE (OUTPATIENT)
Dept: INTERNAL MEDICINE CLINIC | Age: 83
End: 2023-02-09

## 2023-02-09 NOTE — TELEPHONE ENCOUNTER
LV 01/10/2023  NV N/A      Patient was due for her second shingles vaccine in July and forgot. She states she has shingles again but now on her face, forehead and down the side of her eyes. Sx  Itching   A Little painful  Spots are red    Please [address] [facility] [address]    Please call and advise patient. It does take her a minute to get to the phone so you may have to call twice.

## 2023-02-10 RX ORDER — FAMCICLOVIR 500 MG/1
500 TABLET ORAL 3 TIMES DAILY
Qty: 21 TABLET | Refills: 0 | Status: SHIPPED | OUTPATIENT
Start: 2023-02-10 | End: 2023-02-17

## 2023-02-10 RX ORDER — ACYCLOVIR 50 MG/G
OINTMENT TOPICAL
Qty: 1 EACH | Refills: 1 | Status: SHIPPED | OUTPATIENT
Start: 2023-02-10 | End: 2023-02-17

## 2023-02-22 DIAGNOSIS — R52 CHRONIC GENERALIZED PAIN: ICD-10-CM

## 2023-02-22 DIAGNOSIS — G89.29 CHRONIC GENERALIZED PAIN: ICD-10-CM

## 2023-02-22 RX ORDER — TRAMADOL HYDROCHLORIDE 50 MG/1
50 TABLET ORAL 2 TIMES DAILY PRN
Qty: 60 TABLET | Refills: 0 | Status: SHIPPED | OUTPATIENT
Start: 2023-02-22 | End: 2023-03-24

## 2023-03-10 ENCOUNTER — TELEPHONE (OUTPATIENT)
Dept: INTERNAL MEDICINE CLINIC | Age: 83
End: 2023-03-10

## 2023-03-10 RX ORDER — LATANOPROST 50 UG/ML
2 SOLUTION/ DROPS OPHTHALMIC DAILY
Qty: 90 EACH | Refills: 5 | Status: SHIPPED | OUTPATIENT
Start: 2023-03-10 | End: 2023-06-08

## 2023-03-10 NOTE — TELEPHONE ENCOUNTER
latanoprost (XALATAN) 0.005 % ophthalmic solution [[id number]]     Order Details  Dose: 2 drop Route: Both Eyes Frequency: DAILY   Dispense Quantity: 90 each Refills: 5          Sig: Place 2 drops into both eyes daily       Pharmacist called to clarify dose, usually dose is 1 drop in both eyes daily, 2 drops is a high dose and will require a prior auth.   Please advise

## 2023-03-21 DIAGNOSIS — R52 CHRONIC GENERALIZED PAIN: ICD-10-CM

## 2023-03-21 DIAGNOSIS — G89.29 CHRONIC GENERALIZED PAIN: ICD-10-CM

## 2023-03-21 RX ORDER — TRAMADOL HYDROCHLORIDE 50 MG/1
50 TABLET ORAL 2 TIMES DAILY
Qty: 60 TABLET | Refills: 0 | Status: SHIPPED | OUTPATIENT
Start: 2023-03-21 | End: 2023-04-20

## 2023-04-20 ENCOUNTER — TELEPHONE (OUTPATIENT)
Dept: INTERNAL MEDICINE CLINIC | Age: 83
End: 2023-04-20

## 2023-04-22 DIAGNOSIS — G89.29 CHRONIC GENERALIZED PAIN: ICD-10-CM

## 2023-04-22 DIAGNOSIS — R52 CHRONIC GENERALIZED PAIN: ICD-10-CM

## 2023-04-22 DIAGNOSIS — I10 ESSENTIAL HYPERTENSION: ICD-10-CM

## 2023-04-22 DIAGNOSIS — F41.9 ANXIETY: ICD-10-CM

## 2023-04-24 RX ORDER — TRAMADOL HYDROCHLORIDE 50 MG/1
TABLET ORAL
Qty: 60 TABLET | Refills: 0 | OUTPATIENT
Start: 2023-04-24

## 2023-04-24 RX ORDER — LORAZEPAM 1 MG/1
TABLET ORAL
Qty: 270 TABLET | Refills: 0 | OUTPATIENT
Start: 2023-04-24

## 2023-04-24 NOTE — TELEPHONE ENCOUNTER
LVM requesting patient return call. If patient is still having concerns, will need to schedule appointment. Office has openings 4/25.

## 2023-05-01 ENCOUNTER — HOSPITAL ENCOUNTER (OUTPATIENT)
Age: 83
Setting detail: SPECIMEN
Discharge: HOME | End: 2023-05-01

## 2023-05-01 ENCOUNTER — TELEPHONE (OUTPATIENT)
Dept: INTERNAL MEDICINE CLINIC | Age: 83
End: 2023-05-01

## 2023-05-01 ENCOUNTER — OFFICE VISIT (OUTPATIENT)
Dept: INTERNAL MEDICINE CLINIC | Age: 83
End: 2023-05-01
Payer: MEDICARE

## 2023-05-01 VITALS
HEART RATE: 100 BPM | HEIGHT: 67 IN | BODY MASS INDEX: 22.44 KG/M2 | WEIGHT: 143 LBS | DIASTOLIC BLOOD PRESSURE: 80 MMHG | OXYGEN SATURATION: 96 % | SYSTOLIC BLOOD PRESSURE: 126 MMHG

## 2023-05-01 DIAGNOSIS — H04.123 DRY EYES, BILATERAL: ICD-10-CM

## 2023-05-01 DIAGNOSIS — R52 CHRONIC GENERALIZED PAIN: ICD-10-CM

## 2023-05-01 DIAGNOSIS — K40.90 LEFT INGUINAL HERNIA: Primary | ICD-10-CM

## 2023-05-01 DIAGNOSIS — K40.90 LEFT INGUINAL HERNIA: ICD-10-CM

## 2023-05-01 DIAGNOSIS — G89.29 CHRONIC GENERALIZED PAIN: ICD-10-CM

## 2023-05-01 LAB
ANION GAP SERPL CALCULATED.3IONS-SCNC: 9 MMOL/L (ref 9–17)
BUN SERPL-MCNC: 17 MG/DL (ref 8–23)
CALCIUM SERPL-MCNC: 10.1 MG/DL (ref 8.6–10.4)
CHLORIDE SERPL-SCNC: 102 MMOL/L (ref 98–107)
CO2 SERPL-SCNC: 30 MMOL/L (ref 20–31)
CREAT SERPL-MCNC: 0.61 MG/DL (ref 0.5–0.9)
GFR SERPL CREATININE-BSD FRML MDRD: >60 ML/MIN/1.73M2
GLUCOSE SERPL-MCNC: 83 MG/DL (ref 70–99)
POTASSIUM SERPL-SCNC: 4.9 MMOL/L (ref 3.7–5.3)
SODIUM SERPL-SCNC: 141 MMOL/L (ref 135–144)

## 2023-05-01 PROCEDURE — 3074F SYST BP LT 130 MM HG: CPT | Performed by: INTERNAL MEDICINE

## 2023-05-01 PROCEDURE — 99213 OFFICE O/P EST LOW 20 MIN: CPT | Performed by: INTERNAL MEDICINE

## 2023-05-01 PROCEDURE — 1123F ACP DISCUSS/DSCN MKR DOCD: CPT | Performed by: INTERNAL MEDICINE

## 2023-05-01 PROCEDURE — 3079F DIAST BP 80-89 MM HG: CPT | Performed by: INTERNAL MEDICINE

## 2023-05-01 RX ORDER — BUSPIRONE HYDROCHLORIDE 5 MG/1
5 TABLET ORAL 3 TIMES DAILY
Qty: 30 TABLET | Refills: 0 | Status: SHIPPED | OUTPATIENT
Start: 2023-05-01 | End: 2023-05-31

## 2023-05-01 RX ORDER — POLYVINYL ALCOHOL 14 MG/ML
2 SOLUTION/ DROPS OPHTHALMIC PRN
Qty: 1 EACH | Refills: 2 | Status: SHIPPED | OUTPATIENT
Start: 2023-05-01

## 2023-05-01 RX ORDER — TRAMADOL HYDROCHLORIDE 50 MG/1
50 TABLET ORAL 2 TIMES DAILY
Qty: 60 TABLET | Refills: 0 | Status: SHIPPED | OUTPATIENT
Start: 2023-05-01 | End: 2023-05-31

## 2023-05-01 SDOH — ECONOMIC STABILITY: FOOD INSECURITY: WITHIN THE PAST 12 MONTHS, YOU WORRIED THAT YOUR FOOD WOULD RUN OUT BEFORE YOU GOT MONEY TO BUY MORE.: NEVER TRUE

## 2023-05-01 SDOH — ECONOMIC STABILITY: HOUSING INSECURITY
IN THE LAST 12 MONTHS, WAS THERE A TIME WHEN YOU DID NOT HAVE A STEADY PLACE TO SLEEP OR SLEPT IN A SHELTER (INCLUDING NOW)?: NO

## 2023-05-01 SDOH — ECONOMIC STABILITY: FOOD INSECURITY: WITHIN THE PAST 12 MONTHS, THE FOOD YOU BOUGHT JUST DIDN'T LAST AND YOU DIDN'T HAVE MONEY TO GET MORE.: NEVER TRUE

## 2023-05-01 SDOH — ECONOMIC STABILITY: INCOME INSECURITY: HOW HARD IS IT FOR YOU TO PAY FOR THE VERY BASICS LIKE FOOD, HOUSING, MEDICAL CARE, AND HEATING?: NOT HARD AT ALL

## 2023-05-01 ASSESSMENT — ENCOUNTER SYMPTOMS
GASTROINTESTINAL NEGATIVE: 1
RESPIRATORY NEGATIVE: 1
EYES NEGATIVE: 1

## 2023-05-01 NOTE — TELEPHONE ENCOUNTER
Patients daughter informing that at Trisha's scheduled appointment today she thought that she mentioned having pain while urinating , would like to know if physician could  order for urinalysis as well?

## 2023-05-01 NOTE — PROGRESS NOTES
Visit Information    Have you changed or started any medications since your last visit including any over-the-counter medicines, vitamins, or herbal medicines? no   Are you having any side effects from any of your medications? -  no  Have you stopped taking any of your medications? Is so, why? -  no    Have you seen any other physician or provider since your last visit? No  Have you had any other diagnostic tests since your last visit? No  Have you been seen in the emergency room and/or had an admission to a hospital since we last saw you? No  Have you had your routine dental cleaning in the past 6 months? no    Have you activated your [employer] account? If not, what are your barriers?  Yes     Patient Care Team:  Kimi Dick MD as PCP - Herbert Gama MD as PCP - Empaneled Provider  Fabio Ardon MD as Consulting Physician (Gastroenterology)    Medical History Review  Past Medical, Family, and Social History reviewed and does contribute to the patient presenting condition    Health Maintenance   Topic Date Due    DTaP/Tdap/Td vaccine (1 - Tdap) Never done    DEXA (modify frequency per FRAX score)  Never done    Annual Wellness Visit (AWV)  12/15/2021    COVID-19 Vaccine (4 - Booster for Nguyen Peter series) 02/01/2022    Depression Screen  01/10/2024    Flu vaccine  Completed    Shingles vaccine  Completed    Pneumococcal 65+ years Vaccine  Completed    Hepatitis A vaccine  Aged Out    Hib vaccine  Aged Out    Meningococcal (ACWY) vaccine  Aged Out
busPIRone (BUSPAR) 5 MG tablet     Sig: Take 1 tablet by mouth 3 times daily     Dispense:  30 tablet     Refill:  0     Orders Placed This Encounter   Procedures    CT ABDOMEN W CONTRAST     Standing Status:   Future     Standing Expiration Date:   5/1/2024     Order Specific Question:   Additional Contrast?     Answer:   Radiologist Recommendation     Order Specific Question:   STAT Creatinine as needed:     Answer:   No    Basic Metabolic Panel     Standing Status:   Future     Standing Expiration Date:   5/1/2024            Patient given educational materials - see patient instructions. Discussed use, benefit, and side effects of prescribed medications. All patientquestions answered. Pt voiced understanding.     Electronically signed by Familia Butterfield MD on 5/1/2023at 1:09 PM

## 2023-05-02 DIAGNOSIS — R30.0 DYSURIA: Primary | ICD-10-CM

## 2023-05-03 ENCOUNTER — HOSPITAL ENCOUNTER (OUTPATIENT)
Age: 83
Setting detail: SPECIMEN
Discharge: HOME | End: 2023-05-03

## 2023-05-03 DIAGNOSIS — R30.0 DYSURIA: ICD-10-CM

## 2023-05-03 LAB
BACTERIA: ABNORMAL
BILIRUBIN URINE: NEGATIVE
CASTS UA: ABNORMAL /LPF (ref 0–8)
COLOR: YELLOW
EPITHELIAL CELLS UA: ABNORMAL /HPF (ref 0–5)
GLUCOSE UR STRIP.AUTO-MCNC: NEGATIVE MG/DL
KETONES UR STRIP.AUTO-MCNC: NEGATIVE MG/DL
LEUKOCYTE ESTERASE UR QL STRIP.AUTO: ABNORMAL
NITRITE UR QL STRIP.AUTO: NEGATIVE
PROT UR STRIP.AUTO-MCNC: 6 MG/DL (ref 5–8)
PROT UR STRIP.AUTO-MCNC: NEGATIVE MG/DL
RBC CLUMPS #/AREA URNS AUTO: ABNORMAL /HPF (ref 0–4)
SPECIFIC GRAVITY UA: 1.01 (ref 1–1.03)
TURBIDITY: ABNORMAL
URINE HGB: ABNORMAL
UROBILINOGEN, URINE: NORMAL
WBC UA: ABNORMAL /HPF (ref 0–5)

## 2023-05-05 LAB
MICROORGANISM SPEC CULT: NORMAL
SPECIMEN DESCRIPTION: NORMAL

## 2023-05-10 DIAGNOSIS — I10 ESSENTIAL HYPERTENSION: ICD-10-CM

## 2023-05-13 ENCOUNTER — HOSPITAL ENCOUNTER (OUTPATIENT)
Dept: CT IMAGING | Age: 83
End: 2023-05-13
Payer: MEDICARE

## 2023-05-13 DIAGNOSIS — K40.90 LEFT INGUINAL HERNIA: ICD-10-CM

## 2023-05-13 PROCEDURE — 74177 CT ABD & PELVIS W/CONTRAST: CPT

## 2023-05-13 PROCEDURE — 6360000004 HC RX CONTRAST MEDICATION: Performed by: INTERNAL MEDICINE

## 2023-05-13 PROCEDURE — 2580000003 HC RX 258: Performed by: INTERNAL MEDICINE

## 2023-05-13 RX ORDER — 0.9 % SODIUM CHLORIDE 0.9 %
100 INTRAVENOUS SOLUTION INTRAVENOUS ONCE
Status: COMPLETED | OUTPATIENT
Start: 2023-05-13 | End: 2023-05-13

## 2023-05-13 RX ORDER — SODIUM CHLORIDE 0.9 % (FLUSH) 0.9 %
10 SYRINGE (ML) INJECTION PRN
Status: DISCONTINUED | OUTPATIENT
Start: 2023-05-13 | End: 2023-05-16 | Stop reason: HOSPADM

## 2023-05-13 RX ADMIN — SODIUM CHLORIDE 100 ML: 9 INJECTION, SOLUTION INTRAVENOUS at 10:16

## 2023-05-13 RX ADMIN — IOPAMIDOL 75 ML: 755 INJECTION, SOLUTION INTRAVENOUS at 10:16

## 2023-05-13 RX ADMIN — SODIUM CHLORIDE, PRESERVATIVE FREE 10 ML: 5 INJECTION INTRAVENOUS at 10:16

## 2023-05-15 DIAGNOSIS — K40.90 LEFT INGUINAL HERNIA: Primary | ICD-10-CM

## 2023-05-17 DIAGNOSIS — I10 ESSENTIAL HYPERTENSION: ICD-10-CM

## 2023-05-18 DIAGNOSIS — F41.9 ANXIETY: ICD-10-CM

## 2023-05-18 DIAGNOSIS — I10 ESSENTIAL HYPERTENSION: ICD-10-CM

## 2023-05-18 RX ORDER — OMEPRAZOLE 20 MG/1
CAPSULE, DELAYED RELEASE ORAL
Qty: 270 CAPSULE | Refills: 0 | Status: SHIPPED | OUTPATIENT
Start: 2023-05-18

## 2023-05-22 RX ORDER — LORAZEPAM 1 MG/1
1 TABLET ORAL EVERY 8 HOURS PRN
Qty: 270 TABLET | Refills: 0 | Status: SHIPPED | OUTPATIENT
Start: 2023-05-22 | End: 2023-08-20

## 2023-05-26 DIAGNOSIS — G89.29 CHRONIC GENERALIZED PAIN: ICD-10-CM

## 2023-05-26 DIAGNOSIS — R52 CHRONIC GENERALIZED PAIN: ICD-10-CM

## 2023-05-31 DIAGNOSIS — R52 CHRONIC GENERALIZED PAIN: ICD-10-CM

## 2023-05-31 DIAGNOSIS — G89.29 CHRONIC GENERALIZED PAIN: ICD-10-CM

## 2023-05-31 RX ORDER — TRAMADOL HYDROCHLORIDE 50 MG/1
50 TABLET ORAL 2 TIMES DAILY
Qty: 60 TABLET | Refills: 0 | Status: SHIPPED | OUTPATIENT
Start: 2023-05-31 | End: 2023-06-30

## 2023-06-08 ENCOUNTER — TELEPHONE (OUTPATIENT)
Dept: INTERNAL MEDICINE CLINIC | Age: 83
End: 2023-06-08

## 2023-06-08 DIAGNOSIS — K57.90 DIVERTICULOSIS: Primary | ICD-10-CM

## 2023-06-08 DIAGNOSIS — K92.2 GASTROINTESTINAL HEMORRHAGE, UNSPECIFIED GASTROINTESTINAL HEMORRHAGE TYPE: ICD-10-CM

## 2023-06-08 NOTE — TELEPHONE ENCOUNTER
Patient was advised by Dr Kashif Bose to contact PCP in request for referral to GI due to  Cirrhosis of the liver. Patient is very nervous and upset on being informed of having cirrhosis of the liver and would like to get this message to a physician soon and does not wish to wait and schedule appointment for next week.

## 2023-06-22 ENCOUNTER — TELEPHONE (OUTPATIENT)
Dept: INTERNAL MEDICINE CLINIC | Age: 83
End: 2023-06-22

## 2023-06-22 NOTE — TELEPHONE ENCOUNTER
Cinthia Billnimesh dropped off [employer] Application. she needs needs completed for Transportation. she needs this completed by NA. Please fax/call/mail to Fax please but also would like to pick original in office     Paperwork was given to ALICE Grewal  .

## 2023-06-23 NOTE — TELEPHONE ENCOUNTER
Please advise if forms can be filled out for patients transportation to medical appointments? Patient was last seen in the office 5/1/23.

## 2023-07-05 ENCOUNTER — OFFICE VISIT (OUTPATIENT)
Dept: GASTROENTEROLOGY | Age: 83
End: 2023-07-05
Payer: MEDICARE

## 2023-07-05 VITALS
HEART RATE: 105 BPM | WEIGHT: 136.8 LBS | TEMPERATURE: 97.1 F | DIASTOLIC BLOOD PRESSURE: 92 MMHG | BODY MASS INDEX: 21.98 KG/M2 | HEIGHT: 66 IN | SYSTOLIC BLOOD PRESSURE: 148 MMHG

## 2023-07-05 DIAGNOSIS — R63.4 WEIGHT LOSS: ICD-10-CM

## 2023-07-05 DIAGNOSIS — K74.60 CIRRHOSIS OF LIVER WITHOUT ASCITES, UNSPECIFIED HEPATIC CIRRHOSIS TYPE (HCC): Primary | ICD-10-CM

## 2023-07-05 DIAGNOSIS — R79.9 ABNORMAL FINDING OF BLOOD CHEMISTRY, UNSPECIFIED: ICD-10-CM

## 2023-07-05 PROCEDURE — 3077F SYST BP >= 140 MM HG: CPT | Performed by: INTERNAL MEDICINE

## 2023-07-05 PROCEDURE — APPSS60 APP SPLIT SHARED TIME 46-60 MINUTES: Performed by: NURSE PRACTITIONER

## 2023-07-05 PROCEDURE — 99204 OFFICE O/P NEW MOD 45 MIN: CPT | Performed by: INTERNAL MEDICINE

## 2023-07-05 PROCEDURE — 1123F ACP DISCUSS/DSCN MKR DOCD: CPT | Performed by: INTERNAL MEDICINE

## 2023-07-05 PROCEDURE — 3080F DIAST BP >= 90 MM HG: CPT | Performed by: INTERNAL MEDICINE

## 2023-07-05 RX ORDER — BISACODYL 5 MG/1
TABLET, DELAYED RELEASE ORAL
Qty: 4 TABLET | Refills: 0 | Status: SHIPPED | OUTPATIENT
Start: 2023-07-05

## 2023-07-05 RX ORDER — POLYETHYLENE GLYCOL 3350 17 G/17G
POWDER, FOR SOLUTION ORAL
Qty: 238 G | Refills: 0 | Status: SHIPPED | OUTPATIENT
Start: 2023-07-05

## 2023-07-05 ASSESSMENT — ENCOUNTER SYMPTOMS
BACK PAIN: 0
CHOKING: 0
ANAL BLEEDING: 0
SORE THROAT: 0
ABDOMINAL PAIN: 0
SINUS PRESSURE: 0
BLOOD IN STOOL: 0
WHEEZING: 0
RECTAL PAIN: 0
VOMITING: 0
CONSTIPATION: 0
COUGH: 0
DIARRHEA: 0
NAUSEA: 0
VOICE CHANGE: 0
ABDOMINAL DISTENTION: 0
TROUBLE SWALLOWING: 0

## 2023-07-05 NOTE — PROGRESS NOTES
liver-spleen scan  Also get colonoscopy, EGD given her weight loss. RTO next 4-6 weeks    Spent 30 minutes providing patient education and counseling. Thank you for allowing me to participate in the care of Ms. Freed. For any further questions please do not hesitate to contact me. Note is dictated utilizing voice recognition software. Unfortunately this leads to occasional typographical errors. Please contact our office if you have any questions. GI attending physician note. Patient seen with APRN     I independently performed an evaluation on Trisha Freed. I have reviewed the above documentation completed by the Nurse Practitioner and agree with the history, examination, and management plan. Recommendations discussed. History discussed with the patient and examined. This patient has significant weight loss. Appetite has been decreased. No nausea vomiting. Feels weak tired. Previous records reviewed. Last time patient was seen by me was in 2019 and at that time she had anemia and unexplained blood loss anemia. Supposed to have capsule study of the small bowel was not followed up. At present labs are within normal limits. CT scan did reveal questionable finding of chronic liver disease. Exam nonspecific. She does have small left inguinal hernia/left, reducible. No peripheral signs of chronic liver disease. Liver not palpable. No other mass palpable. Recommendations:    Patient will need work-up regarding weight loss. Also need further work-up regarding chronic liver disease. Discussed with the patient regarding this she understood and agreed. I have reviewed and agree with the MA/LPN ROSFlako Stahl MD, Sae Per Lilreji  Board Certified in Gastroenterology and [address] [facility]  Office #: [phone]

## 2023-07-07 DIAGNOSIS — R52 CHRONIC GENERALIZED PAIN: ICD-10-CM

## 2023-07-07 DIAGNOSIS — G89.29 CHRONIC GENERALIZED PAIN: ICD-10-CM

## 2023-07-07 RX ORDER — TRAMADOL HYDROCHLORIDE 50 MG/1
50 TABLET ORAL 2 TIMES DAILY
Qty: 60 TABLET | Refills: 0 | Status: SHIPPED | OUTPATIENT
Start: 2023-07-07 | End: 2023-08-06

## 2023-07-07 NOTE — TELEPHONE ENCOUNTER
Medication Requested: Tramadol    Last visit: 2023  Next visit: 2023  Last refill: 2023    Med contract on file:  [] yes   [] no    Last urine drug screen: 11/15/2019  Consistent with medication(s):    [x] yes   [] no    Last OARRS ran: Unknown    Quantity of medication remainin    Who will be picking rx up: Patient/Delivery    Pharmacy if escribed: [facility]

## 2023-07-08 ENCOUNTER — HOSPITAL ENCOUNTER (OUTPATIENT)
Age: 83
Discharge: HOME | End: 2023-07-08
Payer: MEDICARE

## 2023-07-08 DIAGNOSIS — R79.9 ABNORMAL FINDING OF BLOOD CHEMISTRY, UNSPECIFIED: ICD-10-CM

## 2023-07-08 DIAGNOSIS — R63.4 WEIGHT LOSS: ICD-10-CM

## 2023-07-08 DIAGNOSIS — K74.60 CIRRHOSIS OF LIVER WITHOUT ASCITES, UNSPECIFIED HEPATIC CIRRHOSIS TYPE (HCC): ICD-10-CM

## 2023-07-08 LAB
AFP SERPL-MCNC: 1.5 UG/L
ALANINE AMINOTRANSFERASE, FIBROMETER: NORMAL
ALBUMIN SERPL-MCNC: 4.4 G/DL (ref 3.5–5.2)
ALP SERPL-CCNC: 79 U/L (ref 35–104)
ALPHA-2-MACROGLOBULIN, FIBROMETER: NORMAL
ALT SERPL-CCNC: 14 U/L (ref 5–33)
ASPARTATE AMINOTRANSFERASE, FIBROMETER: NORMAL
AST SERPL-CCNC: 20 U/L
BILIRUB DIRECT SERPL-MCNC: 0.2 MG/DL
BILIRUB INDIRECT SERPL-MCNC: 0.5 MG/DL (ref 0–1)
BILIRUB SERPL-MCNC: 0.7 MG/DL (ref 0.3–1.2)
CERULOPLASMIN SERPL-MCNC: 28 MG/DL (ref 16–45)
CIRRHOMETER PATIENT SCORE: NORMAL
EER FIBROMETER REPORT: NORMAL
FERRITIN SERPL-MCNC: 56 NG/ML (ref 13–150)
FIBROMETER INTERPRETATION: NORMAL
FIBROMETER PATIENT SCORE: NORMAL
FIBROMETER PLATELET COUNT: 252
FIBROMETER PROTHROMBIN INDEX: NORMAL
FIBROSIS METAVIR CLASSIFICATION: NORMAL
GAMMA GLUTAMYL TRANSFERASE, FIBROMETER: NORMAL
HAV IGM SERPL QL IA: NONREACTIVE
HBV CORE IGM SERPL QL IA: NONREACTIVE
HBV SURFACE AG SERPL QL IA: NONREACTIVE
HCV AB SERPL QL IA: NONREACTIVE
INFLAMETER METAVIR CLASSIFICATION: NORMAL
INFLAMETER PATIENT SCORE: NORMAL
PROT SERPL-MCNC: 7.9 G/DL (ref 6.4–8.3)
TRANSFERRIN SERPL-MCNC: 300 MG/DL (ref 200–360)
TSH SERPL DL<=0.05 MIU/L-ACNC: 1.7 UIU/ML (ref 0.3–5)
UREA NITROGEN, FIBROMETER: NORMAL

## 2023-07-08 PROCEDURE — 84460 ALANINE AMINO (ALT) (SGPT): CPT

## 2023-07-08 PROCEDURE — 80076 HEPATIC FUNCTION PANEL: CPT

## 2023-07-08 PROCEDURE — 83883 ASSAY NEPHELOMETRY NOT SPEC: CPT

## 2023-07-08 PROCEDURE — 86038 ANTINUCLEAR ANTIBODIES: CPT

## 2023-07-08 PROCEDURE — 84466 ASSAY OF TRANSFERRIN: CPT

## 2023-07-08 PROCEDURE — 82390 ASSAY OF CERULOPLASMIN: CPT

## 2023-07-08 PROCEDURE — 36415 COLL VENOUS BLD VENIPUNCTURE: CPT

## 2023-07-08 PROCEDURE — 83516 IMMUNOASSAY NONANTIBODY: CPT

## 2023-07-08 PROCEDURE — 84443 ASSAY THYROID STIM HORMONE: CPT

## 2023-07-08 PROCEDURE — 82728 ASSAY OF FERRITIN: CPT

## 2023-07-08 PROCEDURE — 84450 TRANSFERASE (AST) (SGOT): CPT

## 2023-07-08 PROCEDURE — 82105 ALPHA-FETOPROTEIN SERUM: CPT

## 2023-07-08 PROCEDURE — 84520 ASSAY OF UREA NITROGEN: CPT

## 2023-07-08 PROCEDURE — 82104 ALPHA-1-ANTITRYPSIN PHENO: CPT

## 2023-07-08 PROCEDURE — 83036 HEMOGLOBIN GLYCOSYLATED A1C: CPT

## 2023-07-08 PROCEDURE — 86225 DNA ANTIBODY NATIVE: CPT

## 2023-07-08 PROCEDURE — 82977 ASSAY OF GGT: CPT

## 2023-07-08 PROCEDURE — 82103 ALPHA-1-ANTITRYPSIN TOTAL: CPT

## 2023-07-08 PROCEDURE — 80074 ACUTE HEPATITIS PANEL: CPT

## 2023-07-09 LAB
EST. AVERAGE GLUCOSE BLD GHB EST-MCNC: 111 MG/DL
HBA1C MFR BLD: 5.5 % (ref 4–6)
SMOOTH MUSCLE ANTIBODY: 6 UNITS (ref 0–19)

## 2023-07-10 ENCOUNTER — HOSPITAL ENCOUNTER (OUTPATIENT)
Dept: PREADMISSION TESTING | Age: 83
Discharge: HOME | End: 2023-07-14

## 2023-07-10 LAB
ALPHA-1 ANTITRYPSIN PHENOTYPE: NORMAL
ALPHA-1 ANTITRYPSIN: 163 MG/DL (ref 90–200)

## 2023-07-10 NOTE — PROGRESS NOTES
Pre-op Instructions For Out-Patient Endoscopy Surgery    Medication Instructions:  Please stop herbs and any supplements now (includes vitamins and minerals). Please contact your surgeon and prescribing physician for pre-op instructions for any blood thinners. If you have inhalers/aerosol treatments at home, please use them the morning of your surgery and bring the inhalers with you to the hospital.    Please take the following medications the morning of your surgery with a sip of water:    Lisinopril, Metoprolol    Surgery Instructions:  After midnight before surgery:  Do not eat or drink anything, including water, mints, gum, and hard candy. You may brush your teeth without swallowing. No smoking, chewing tobacco, or street drugs. Please shower or bathe before surgery. Please do not wear any cologne, lotion, powder, jewelry, piercings, perfume, makeup, nail polish, hair accessories, or hair spray on the day of surgery. Wear loose comfortable clothing. Leave your valuables at home. Bring a storage case for any glasses/contacts. An adult who is responsible for you MUST drive you home and should be with you for the first 24 hours after surgery. The Day of Surgery:  Arrive at [facility] AT [facility] Surgery Entrance at the time directed by your surgeon and check in at the desk. If you have a living will or healthcare power of , please bring a copy. You will be taken to the pre-op holding area where you will be prepared for surgery. A physical assessment will be performed by a nurse practitioner or house officer. Your IV will be started and you will meet your anesthesiologist.    When you go to surgery, your family will be directed to the surgical waiting room, where the doctor should speak with them after your surgery. After surgery, you will be taken to the recovery room and or short stay unit for recovery and preparation for discharge.      Pt verbalizes

## 2023-07-11 LAB
ALANINE AMINOTRANSFERASE, FIBROMETER: 19 U/L (ref 5–40)
ALPHA-2-MACROGLOBULIN, FIBROMETER: 460 MG/DL (ref 131–293)
ANA SER QL IA: NEGATIVE
ASPARTATE AMINOTRANSFERASE, FIBROMETER: 24 U/L (ref 9–40)
CIRRHOMETER PATIENT SCORE: 0.06
DSDNA IGG SER QL IA: 1 IU/ML
EER FIBROMETER REPORT: ABNORMAL
FIBROMETER INTERPRETATION: ABNORMAL
FIBROMETER PATIENT SCORE: 0.8
FIBROMETER PLATELET COUNT: 252
FIBROMETER PROTHROMBIN INDEX: 102 % (ref 90–120)
FIBROSIS METAVIR CLASSIFICATION: ABNORMAL
GAMMA GLUTAMYL TRANSFERASE, FIBROMETER: 92 U/L (ref 7–33)
INFLAMETER METAVIR CLASSIFICATION: ABNORMAL
INFLAMETER PATIENT SCORE: 0.48
MITOCHONDRIA M2 IGG SER-ACNC: 1.5 U/ML (ref 0–4)
NUCLEAR IGG SER IA-RTO: 0.3 U/ML
UREA NITROGEN, FIBROMETER: 18 MG/DL (ref 7–20)

## 2023-07-13 DIAGNOSIS — I10 ESSENTIAL HYPERTENSION: ICD-10-CM

## 2023-07-13 DIAGNOSIS — I27.20 PULMONARY HTN (HCC): ICD-10-CM

## 2023-07-13 RX ORDER — FUROSEMIDE 20 MG/1
TABLET ORAL
Qty: 90 TABLET | Refills: 3 | Status: SHIPPED | OUTPATIENT
Start: 2023-07-13

## 2023-07-14 ENCOUNTER — HOSPITAL ENCOUNTER (EMERGENCY)
Age: 83
Discharge: HOME | End: 2023-07-14
Attending: EMERGENCY MEDICINE
Payer: MEDICARE

## 2023-07-14 VITALS
DIASTOLIC BLOOD PRESSURE: 96 MMHG | WEIGHT: 136 LBS | TEMPERATURE: 98.3 F | RESPIRATION RATE: 16 BRPM | BODY MASS INDEX: 21.86 KG/M2 | HEART RATE: 94 BPM | SYSTOLIC BLOOD PRESSURE: 146 MMHG | HEIGHT: 66 IN | OXYGEN SATURATION: 95 %

## 2023-07-14 DIAGNOSIS — I48.21 PERMANENT ATRIAL FIBRILLATION (HCC): Primary | ICD-10-CM

## 2023-07-14 DIAGNOSIS — I83.93 VARICOSE VEINS OF BOTH LOWER EXTREMITIES, UNSPECIFIED WHETHER COMPLICATED: ICD-10-CM

## 2023-07-14 PROCEDURE — 99284 EMERGENCY DEPT VISIT MOD MDM: CPT

## 2023-07-14 ASSESSMENT — LIFESTYLE VARIABLES
HOW MANY STANDARD DRINKS CONTAINING ALCOHOL DO YOU HAVE ON A TYPICAL DAY: PATIENT DOES NOT DRINK
HOW OFTEN DO YOU HAVE A DRINK CONTAINING ALCOHOL: NEVER

## 2023-07-14 ASSESSMENT — ENCOUNTER SYMPTOMS
ABDOMINAL PAIN: 0
CONSTIPATION: 0
COUGH: 0
EYE REDNESS: 0
VOMITING: 0
SHORTNESS OF BREATH: 0
SORE THROAT: 0
EYE PAIN: 0
CHEST TIGHTNESS: 0
BACK PAIN: 0
NAUSEA: 0
DIARRHEA: 0

## 2023-07-14 NOTE — ED NOTES
Patient arrived to the ED today via EMS for a chief complaint of right leg pain with a knot below the knee.      Darrell Ocampo RN  07/14/23 [id number]

## 2023-07-15 NOTE — ED PROVIDER NOTES
[address] [facility],[address] ED  eMERGENCY dEPARTMENT eNCOUnter    Pt Name: Glory Toussaint  MRN: [id number]  [address] [facility] 1940  Date of evaluation: 7/14/23  CHIEF COMPLAINT       Chief Complaint   Patient presents with    Leg Pain     Right leg below the knee     HISTORY OF PRESENT ILLNESS   HPI  Earlier today patient noted an area of swelling on her right shin and states it felt like there was some water under her skin. She called EMS and they found her to be in atrial fibrillation with her heart rate from the 90-120s. Patient was not feeling chest pain, lightheadedness, SOB, palpitations. REVIEW OF SYSTEMS     Review of Systems   Constitutional:  Negative for chills and fever. HENT:  Negative for congestion, ear pain and sore throat. Eyes:  Negative for pain, redness and visual disturbance. Respiratory:  Negative for cough, chest tightness and shortness of breath. Cardiovascular:  Negative for chest pain and palpitations. Gastrointestinal:  Negative for abdominal pain, constipation, diarrhea, nausea and vomiting. Genitourinary:  Negative for dysuria and vaginal discharge. Musculoskeletal:  Negative for back pain and neck pain. Skin:  Negative for rash and wound. Neurological:  Negative for seizures, syncope and headaches. PASTMEDICAL HISTORY     Past Medical History:   Diagnosis Date    Anemia     Anxiety     Arthritis     Tejada's palsy     Bright red blood per rectum     Cataracts, bilateral     DDD (degenerative disc disease)     Gastric ulcer 2014    per egd    Hepatitis 1965    Hyperlipidemia     Hypertension     Inguinal hernia     MRSA (methicillin resistant staph aureus) culture positive 02/22/2017    urine    Osteoarthritis      SURGICAL HISTORY       Past Surgical History:   Procedure Laterality Date    BLADDER SUSPENSION  2000    CATARACT REMOVAL Left 2009    left eye    COLONOSCOPY  9/10/14    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   DIVERTICULOSIS    COLONOSCOPY N/A 6/10/2019    COLONOSCOPY DIAGNOSTIC AND

## 2023-07-19 NOTE — PRE-PROCEDURE INSTRUCTIONS
Have you received your Prep? Waiting for deliver. Any questions with prep instructions? No  Only Clear Liquid Diet day before. Yes  Nothing to eat after midnight day before procedure. Yes  Are you taking any blood thinners? No If so, you need to Stop. Remove any jewelry and body piercings. Yes  Do you wear glasses? If so, please bring a case to store them in. Are you having any Covid symptoms? No  Do you have any new rashes, infections, etc. that we should be aware of? No  Do you have a ride home the day of surgery? Yes  It cannot be a cab or medical transportation.   Verify surgery time/date and what time to arrive at hospital. 0730

## 2023-07-20 ENCOUNTER — HOSPITAL ENCOUNTER (OUTPATIENT)
Age: 83
Setting detail: SPECIMEN
Discharge: HOME | End: 2023-07-20
Payer: MEDICARE

## 2023-07-20 ENCOUNTER — TELEPHONE (OUTPATIENT)
Dept: GASTROENTEROLOGY | Age: 83
End: 2023-07-20

## 2023-07-20 ENCOUNTER — TELEPHONE (OUTPATIENT)
Dept: OTHER | Age: 83
End: 2023-07-20

## 2023-07-20 ENCOUNTER — ANESTHESIA EVENT (OUTPATIENT)
Dept: ENDOSCOPY | Age: 83
End: 2023-07-20
Payer: MEDICARE

## 2023-07-20 PROCEDURE — 82270 OCCULT BLOOD FECES: CPT

## 2023-07-20 NOTE — TELEPHONE ENCOUNTER
Received a VM from [address] [place] [address] with [facility], [place], operated by [facility] requesting a call back and or call the patient to review the prep. The font was too small for the patient. Called the patient and no answer.

## 2023-07-21 ENCOUNTER — ANESTHESIA (OUTPATIENT)
Dept: ENDOSCOPY | Age: 83
End: 2023-07-21
Payer: MEDICARE

## 2023-07-21 ENCOUNTER — HOSPITAL ENCOUNTER (OUTPATIENT)
Age: 83
Setting detail: OUTPATIENT SURGERY
Discharge: HOME | End: 2023-07-21
Attending: INTERNAL MEDICINE | Admitting: INTERNAL MEDICINE
Payer: MEDICARE

## 2023-07-21 VITALS
RESPIRATION RATE: 22 BRPM | TEMPERATURE: 98.2 F | SYSTOLIC BLOOD PRESSURE: 121 MMHG | OXYGEN SATURATION: 95 % | HEART RATE: 64 BPM | DIASTOLIC BLOOD PRESSURE: 86 MMHG

## 2023-07-21 DIAGNOSIS — R63.4 WEIGHT LOSS: ICD-10-CM

## 2023-07-21 LAB
DATE, STOOL #1: 7
HEMOCCULT SP1 STL QL: NEGATIVE
TIME, STOOL #1: NORMAL

## 2023-07-21 PROCEDURE — 45380 COLONOSCOPY AND BIOPSY: CPT | Performed by: INTERNAL MEDICINE

## 2023-07-21 PROCEDURE — 7100000010 HC PHASE II RECOVERY - FIRST 15 MIN: Performed by: INTERNAL MEDICINE

## 2023-07-21 PROCEDURE — 7100000011 HC PHASE II RECOVERY - ADDTL 15 MIN: Performed by: INTERNAL MEDICINE

## 2023-07-21 PROCEDURE — 2709999900 HC NON-CHARGEABLE SUPPLY: Performed by: INTERNAL MEDICINE

## 2023-07-21 PROCEDURE — 2580000003 HC RX 258: Performed by: ANESTHESIOLOGY

## 2023-07-21 PROCEDURE — 2500000003 HC RX 250 WO HCPCS: Performed by: NURSE ANESTHETIST, CERTIFIED REGISTERED

## 2023-07-21 PROCEDURE — 3700000001 HC ADD 15 MINUTES (ANESTHESIA): Performed by: INTERNAL MEDICINE

## 2023-07-21 PROCEDURE — 88341 IMHCHEM/IMCYTCHM EA ADD ANTB: CPT

## 2023-07-21 PROCEDURE — 88305 TISSUE EXAM BY PATHOLOGIST: CPT

## 2023-07-21 PROCEDURE — 88342 IMHCHEM/IMCYTCHM 1ST ANTB: CPT

## 2023-07-21 PROCEDURE — 3700000000 HC ANESTHESIA ATTENDED CARE: Performed by: INTERNAL MEDICINE

## 2023-07-21 PROCEDURE — 3609012400 HC EGD TRANSORAL BIOPSY SINGLE/MULTIPLE: Performed by: INTERNAL MEDICINE

## 2023-07-21 PROCEDURE — 3609010300 HC COLONOSCOPY W/BIOPSY SINGLE/MULTIPLE: Performed by: INTERNAL MEDICINE

## 2023-07-21 PROCEDURE — 43235 EGD DIAGNOSTIC BRUSH WASH: CPT | Performed by: INTERNAL MEDICINE

## 2023-07-21 PROCEDURE — 6360000002 HC RX W HCPCS: Performed by: NURSE ANESTHETIST, CERTIFIED REGISTERED

## 2023-07-21 RX ORDER — SODIUM CHLORIDE 0.9 % (FLUSH) 0.9 %
5-40 SYRINGE (ML) INJECTION PRN
Status: DISCONTINUED | OUTPATIENT
Start: 2023-07-21 | End: 2023-07-21 | Stop reason: HOSPADM

## 2023-07-21 RX ORDER — SODIUM CHLORIDE 0.9 % (FLUSH) 0.9 %
5-40 SYRINGE (ML) INJECTION EVERY 12 HOURS SCHEDULED
Status: DISCONTINUED | OUTPATIENT
Start: 2023-07-21 | End: 2023-07-21 | Stop reason: HOSPADM

## 2023-07-21 RX ORDER — PROPOFOL 10 MG/ML
INJECTION, EMULSION INTRAVENOUS PRN
Status: DISCONTINUED | OUTPATIENT
Start: 2023-07-21 | End: 2023-07-21 | Stop reason: SDUPTHER

## 2023-07-21 RX ORDER — LIDOCAINE HYDROCHLORIDE 20 MG/ML
INJECTION, SOLUTION EPIDURAL; INFILTRATION; INTRACAUDAL; PERINEURAL PRN
Status: DISCONTINUED | OUTPATIENT
Start: 2023-07-21 | End: 2023-07-21 | Stop reason: SDUPTHER

## 2023-07-21 RX ORDER — SODIUM CHLORIDE, SODIUM LACTATE, POTASSIUM CHLORIDE, CALCIUM CHLORIDE 600; 310; 30; 20 MG/100ML; MG/100ML; MG/100ML; MG/100ML
INJECTION, SOLUTION INTRAVENOUS CONTINUOUS
Status: DISCONTINUED | OUTPATIENT
Start: 2023-07-21 | End: 2023-07-21 | Stop reason: HOSPADM

## 2023-07-21 RX ORDER — SODIUM CHLORIDE 9 MG/ML
INJECTION, SOLUTION INTRAVENOUS PRN
Status: DISCONTINUED | OUTPATIENT
Start: 2023-07-21 | End: 2023-07-21 | Stop reason: HOSPADM

## 2023-07-21 RX ORDER — LIDOCAINE HYDROCHLORIDE 10 MG/ML
1 INJECTION, SOLUTION EPIDURAL; INFILTRATION; INTRACAUDAL; PERINEURAL
Status: DISCONTINUED | OUTPATIENT
Start: 2023-07-21 | End: 2023-07-21 | Stop reason: HOSPADM

## 2023-07-21 RX ADMIN — LIDOCAINE HYDROCHLORIDE 80 MG: 20 INJECTION, SOLUTION EPIDURAL; INFILTRATION; INTRACAUDAL; PERINEURAL at 09:18

## 2023-07-21 RX ADMIN — PROPOFOL 150 MCG/KG/MIN: 10 INJECTION, EMULSION INTRAVENOUS at 09:18

## 2023-07-21 RX ADMIN — PROPOFOL 50 MG: 10 INJECTION, EMULSION INTRAVENOUS at 09:18

## 2023-07-21 RX ADMIN — SODIUM CHLORIDE, POTASSIUM CHLORIDE, SODIUM LACTATE AND CALCIUM CHLORIDE: 600; 310; 30; 20 INJECTION, SOLUTION INTRAVENOUS at 08:44

## 2023-07-21 ASSESSMENT — PAIN - FUNCTIONAL ASSESSMENT: PAIN_FUNCTIONAL_ASSESSMENT: 0-10

## 2023-07-21 ASSESSMENT — PAIN SCALES - GENERAL: PAINLEVEL_OUTOF10: 0

## 2023-07-21 NOTE — DISCHARGE INSTRUCTIONS
To see in the [facility]    In order to continue your care at home, please follow the instructions below. For General Anesthesia:  Do not drink any alcoholic beverages or make any legal or important decisions for 24 hours. Do not drive or operate machinery for 24 hours. You may return to work after 24 hours. Diet    Drink plenty of fluids after surgery, unless you are on a fluid restriction. After general anesthesia, start out eating lightly (broth, soup, bread, etc.) advancing as tolerated to your usual diet. Try to avoid spicy or greasy/fatty foods for 48 hours due to the EGD. Avoid milk/milk product for several hours. If your gag reflex has not returned but you are able to swallow, cut food into small bites, chew food thoroughly and drink fluids carefully for the next 2 hours. Medications  Take medications as ordered by your surgeon. Activities  Limit your activities for 24 hours. Walk around to pass gas. You may shower. Call your surgeon for the following: If you have abdominal pain that is not relieved by passing gas. For an oral temperature (by mouth) is 101 degrees or higher, chills or excessive sweating. You have increasing and progressive bleeding or drainage from surgery area. Persistent nausea or vomiting  Vomiting blood (may be red or black)  Rectal bleeding (may be red, maroon, or black) or change in your bowel habits. Severe sore throat or neck pain  If you are unable to urinate within 8 hours of surgery  Redness or swelling at the IV site. For any questions or concerns you may have Sedation or General Anesthesia, Adult  Care After  Refer to this sheet in the next 24 hours. These instructions provide you with information on caring for yourself after your procedure. Your caregiver may also give you more specific instructions.  Your treatment has been planned according to current medical practices,

## 2023-07-21 NOTE — OP NOTE
ESOPHAGOGASTRODUODENOSCOPY   ( EGD )  DATE OF PROCEDURE: 7/21/2023     SURGEON: Kassidy Clement MD    ASSISTANT: None    PREOPERATIVE DIAGNOSIS: Patient has cirrhosis of the liver. Procedure performed to evaluate varices, portal hypertensive gastropathy    POSTOPERATIVE DIAGNOSIS: No lesions seen up to the second part of the duodenum no varices seen. No signs of hypertensive gastropathy. OPERATION: Upper GI endoscopy with Biopsy    ANESTHESIA: MAC    ESTIMATED BLOOD LOSS: None    COMPLICATIONS: None. SPECIMENS:  Was Not Obtained    HISTORY: The patient is a 82y.o. year old female with history of above preop diagnosis. I recommended esophagogastroduodenoscopy with possible biopsy and I explained the risk, benefits, expected outcome, and alternatives to the procedure. Risks included but are not limited to bleeding, infection, respiratory distress, hypotension, and perforation of the esophagus, stomach, or duodenum. Patient understands and is in agreement. PROCEDURE: The patient was given IV conscious sedation. The patient's SPO2 remained above 90% throughout the procedure. Cetacaine spray given. Patient placed in left lateral position. Olympus  videogastroscope was inserted orally under vision into the esophagus without difficulty and advanced into the stomach then through the pylorus up to the second part of duodenum. Findings:    Retropharyngeal area was grossly normal appearing    Esophagus: normal.  No varices seen. No signs of peptic esophagitis or Brand's mucosa seen. No stricture. Stomach:    Fundus and Cardia Examined in Retroflexed View: normal    Body: normal    Antrum: normal  No gastric varices or signs of portal hypertensive gastropathy seen. Duodenum:     Descending: normal    Bulb: normal    While withdrawing the scope the above findings were verified and the scope was removed. The patient has tolerated the procedure without unusual events.

## 2023-07-21 NOTE — ANESTHESIA PRE PROCEDURE
Department of Anesthesiology  Preprocedure Note       Name:  Leonela Sofia   Age:  82 y.o.  :  1940                                          MRN:  [id number]         Date:  2023      Surgeon: Ruddy Chapin):  Sophie Daigle MD    Procedure: Procedure(s):  EGD BIOPSY  COLONOSCOPY DIAGNOSTIC    Medications prior to admission:   Prior to Admission medications    Medication Sig Start Date End Date Taking? Authorizing Provider   furosemide (LASIX) 20 MG tablet TAKE 1 TABLET BY MOUTH EVERY MORNING 23   Jasper Cuellar MD   traMADol (ULTRAM) 50 MG tablet Take 1 tablet by mouth 2 times daily at 0800 and 1400 for 30 days. Max Daily Amount: 100 mg 23  Jasper Cuellar MD   polyethylene glycol [facility]) 17 GM/SCOOP powder Please follow instructions given to you by your provider 23   JAMES Gardner NP   bisacodyl 5 MG EC tablet Please follow instructions given to you by your provider 23   JAMES Gardner NP   LORazepam (ATIVAN) 1 MG tablet Take 1 tablet by mouth every 8 hours as needed for Anxiety for up to 90 days.  Max Daily Amount: 3 mg 23  Jasper Cuellar MD   omeprazole (PRILOSEC) 20 MG delayed release capsule TAKE 1 CAPSULE BY MOUTH DAILY 23   Jasper Cuellar MD   polyvinyl alcohol (LIQUIFILM TEARS) 1.4 % ophthalmic solution Place 2 drops into both eyes as needed for Dry Eyes 23   Saman Pantoja MD   latanoprost (XALATAN) 0.005 % ophthalmic solution Place 2 drops into both eyes daily 3/10/23 6/8/23  Jasper Cuellar MD   lisinopril (PRINIVIL;ZESTRIL) 10 MG tablet TAKE ONE TABLET BY MOUTH ONCE A DAY 22   Jasper Cuellar MD   metoprolol tartrate (LOPRESSOR) 25 MG tablet Take 1 tablet by mouth 2 times daily 20   Jasper Cuellar MD   acetaminophen (TYLENOL) 325 MG tablet Take 2 tablets by mouth every 6 hours as needed for Pain    Historical Provider, MD       Current medications:    Current Facility-Administered

## 2023-07-21 NOTE — H&P
HISTORY and [id number]       NAME:  Humberto Preston  MRN: [id number]   YOB: 1940   Date: 7/21/2023   Age: 82 y.o. Gender: female       COMPLAINT AND PRESENT HISTORY:     Humberto Preston is 82 y.o.,  female, will be having a   EGD ESOPHAGOGASTRODUODENOSCOPY, COLONOSCOPY. Pt is being seen for hx of : Pre-Op Diagnosis Codes:     * Weight loss of 20 lbs  over  a year. Prior Colonoscopy  and EGD was done last in 2019. Patient has hx of Diverticulosis. Pt states that she has  abdominal pain from her present hernia on left side. No  heartburn, indigestion or acid reflux. Pt was  taking Prilosec, she states that she stopped. .  Pt denies any dysphagia or regurgitation. Denies any nausea or vomiting. Pt reports changes in bowel habits, pt states that she has intermittent constipation. No diarrhea. No blood in stools, black tarry stools. Hx of gastric ulcer  Pt report  changes in appetite , states has not been eating well. No appetite. Denies Family Hx of colon or esophageal cancer. Any significant medical hx: Anemia, Bell's Palsy- left eye squeenzing  residual,  hepatitis, HTN, HLD    Denies current chest pain,  SOB. No recent URI, fever or chills. Patient states that she is always tired. Any Anticoagulants or blood thinners: no     Patient denies any hx of blood clots. Functional Capacity per patient:              1. Patient is not able to walk 2 city blocks on level ground without SOB. 2. Patient is not able to climb 2 flights of stairs without SOB. Patient has been NPO since midnight. No blood thinners in the past  5-7 days. Patient took  lisinopril  this am.  Patient states that she started on solid food yesterday morning, unknowing of her diet    Patient reports taking full bowel prep with clear outcome.      Patient denies any personal or family problems with anesthesia       PAST MEDICAL HISTORY     Past

## 2023-07-21 NOTE — TELEPHONE ENCOUNTER
Patient called asking about her nighttime medications. Patient told that she may take her blood pressure medication, tramadol  and Ativan tonight before midnight. Patient states she will take her medications now.

## 2023-07-21 NOTE — OP NOTE
COLONOSCOPY    DATE OF PROCEDURE: 7/21/2023    SURGEON: Brandon Beatty MD    ASSISTANT: None    PREOPERATIVE DIAGNOSIS: Patient had colonoscopy 4 years ago, and at that time she had poor preparation. Procedure for prompt evaluate missed lesions. POSTOPERATIVE DIAGNOSIS: Diverticulosis. Small polyp transverse colon. OPERATION: Total colonoscopy, excision of polyp with biopsy forceps    ANESTHESIA: MAC    ESTIMATED BLOOD LOSS: None    COMPLICATIONS: None     SPECIMENS:  Was Obtained: Polyp transverse colon    HISTORY: The patient is a 82y.o. year old female with history of above preop diagnosis. I recommended colonoscopy with possible biopsy or polypectomy and I explained the risk, benefits, expected outcome, and alternatives to the procedure. Risks included but are not limited to bleeding, infection, respiratory distress, hypotension, and perforation of the colon and possibility of missing a lesion. The patient understands and is in agreement. PROCEDURE:  The patient's SPO2 remained above 90% throughout the procedure. Digital rectal exam was normal.  The colonoscope was inserted through the anus into the rectum and advanced under direct vision to the cecum without difficulty. The prep was adequate. Findings:      Cecum/Ascending colon: normal    Transverse colon: normal    Descending/Sigmoid colon: normal, has diverticulosis in the sigmoid colon    Rectum/Anus: examined in normal and retroflexed positions and was normal    Withdrawal Time was (minutes): 12          The colon was decompressed and the scope was removed. The patient tolerated the procedure without unusual events. During the procedure, the patient's blood pressure, pulse and oxygen saturation remained stable and documented. No unusual events occurred during the procedure. Patient was transferred to recovery room and will be discharged when criteria is met.      Recommendations/Plan:     F/U In Office as

## 2023-07-25 LAB — SURGICAL PATHOLOGY REPORT: NORMAL

## 2023-07-31 DIAGNOSIS — G89.29 CHRONIC GENERALIZED PAIN: ICD-10-CM

## 2023-07-31 DIAGNOSIS — R52 CHRONIC GENERALIZED PAIN: ICD-10-CM

## 2023-07-31 RX ORDER — TRAMADOL HYDROCHLORIDE 50 MG/1
50 TABLET ORAL 2 TIMES DAILY
Qty: 60 TABLET | Refills: 0 | Status: SHIPPED | OUTPATIENT
Start: 2023-07-31 | End: 2023-08-30

## 2023-07-31 NOTE — TELEPHONE ENCOUNTER
----- Message from Juan Manuel Murphy MA sent at 7/31/2023  8:40 AM EDT -----  Subject: Refill Request    QUESTIONS  Name of Medication? traMADol (ULTRAM) 50 MG tablet  Patient-reported dosage and instructions? 50 MG, BID  How many days do you have left? 0  Preferred Pharmacy? [facility] phone number (if available)? [phone]  ---------------------------------------------------------------------------  --------------  CALL BACK INFO  What is the best way for the office to contact you? Do not leave any   message, patient will call back for answer  Preferred Call Back Phone Number? [phone]  ---------------------------------------------------------------------------  --------------  SCRIPT ANSWERS  Relationship to Patient?  Self

## 2023-08-02 ENCOUNTER — OFFICE VISIT (OUTPATIENT)
Dept: GASTROENTEROLOGY | Age: 83
End: 2023-08-02
Payer: MEDICARE

## 2023-08-02 VITALS
SYSTOLIC BLOOD PRESSURE: 127 MMHG | TEMPERATURE: 97.3 F | DIASTOLIC BLOOD PRESSURE: 79 MMHG | BODY MASS INDEX: 22.56 KG/M2 | HEART RATE: 83 BPM | WEIGHT: 140.4 LBS | HEIGHT: 66 IN

## 2023-08-02 DIAGNOSIS — K74.60 CIRRHOSIS OF LIVER WITHOUT ASCITES, UNSPECIFIED HEPATIC CIRRHOSIS TYPE (HCC): ICD-10-CM

## 2023-08-02 DIAGNOSIS — R63.4 WEIGHT LOSS: ICD-10-CM

## 2023-08-02 DIAGNOSIS — K59.00 CONSTIPATION, UNSPECIFIED CONSTIPATION TYPE: Primary | ICD-10-CM

## 2023-08-02 PROCEDURE — 99214 OFFICE O/P EST MOD 30 MIN: CPT | Performed by: INTERNAL MEDICINE

## 2023-08-02 PROCEDURE — 1123F ACP DISCUSS/DSCN MKR DOCD: CPT | Performed by: INTERNAL MEDICINE

## 2023-08-02 PROCEDURE — 3078F DIAST BP <80 MM HG: CPT | Performed by: INTERNAL MEDICINE

## 2023-08-02 PROCEDURE — 3074F SYST BP LT 130 MM HG: CPT | Performed by: INTERNAL MEDICINE

## 2023-08-02 ASSESSMENT — ENCOUNTER SYMPTOMS
NAUSEA: 0
ANAL BLEEDING: 0
BLOOD IN STOOL: 0
DIARRHEA: 0
VOICE CHANGE: 0
BACK PAIN: 0
COUGH: 0
SORE THROAT: 0
RECTAL PAIN: 1
SINUS PRESSURE: 0
ABDOMINAL DISTENTION: 0
ABDOMINAL PAIN: 1
WHEEZING: 0
VOMITING: 0
CONSTIPATION: 1
CHOKING: 0
TROUBLE SWALLOWING: 0

## 2023-08-02 NOTE — PROGRESS NOTES
voice change. Eyes:  Positive for visual disturbance. Respiratory:  Negative for cough, choking and wheezing. Cardiovascular:  Negative for chest pain, palpitations and leg swelling. Gastrointestinal:  Positive for abdominal pain, constipation and rectal pain. Negative for abdominal distention, anal bleeding, blood in stool, diarrhea, nausea and vomiting. Genitourinary:  Negative for difficulty urinating. Musculoskeletal:  Negative for arthralgias, back pain, gait problem and myalgias. Allergic/Immunologic: Negative for environmental allergies and food allergies. Neurological:  Positive for weakness. Negative for dizziness, light-headedness, numbness and headaches. Hematological:  Does not bruise/bleed easily. Psychiatric/Behavioral:  Negative for sleep disturbance. The patient is not nervous/anxious. PHYSICAL EXAMINATION:     Vital signs reviewed per the nursing documentation. There were no vitals taken for this visit. There is no height or weight on file to calculate BMI. Physical Exam  Vitals and nursing note reviewed. Constitutional:       Appearance: Normal appearance. She is well-developed. HENT:      Head: Normocephalic and atraumatic. Eyes:      Extraocular Movements: Extraocular movements intact. Conjunctiva/sclera: Conjunctivae normal.   Neck:      Thyroid: No thyromegaly. Vascular: No hepatojugular reflux or JVD. Trachea: No tracheal deviation. Cardiovascular:      Rate and Rhythm: Normal rate and regular rhythm. Heart sounds: Normal heart sounds. Pulmonary:      Effort: Pulmonary effort is normal. No respiratory distress. Breath sounds: Normal breath sounds. No wheezing or rales. Abdominal:      General: Bowel sounds are normal. There is no distension. Palpations: Abdomen is soft. There is no hepatomegaly or mass. Tenderness: There is no abdominal tenderness. There is no rebound. Hernia: No hernia is present.

## 2023-08-07 ENCOUNTER — OFFICE VISIT (OUTPATIENT)
Dept: INTERNAL MEDICINE CLINIC | Age: 83
End: 2023-08-07
Payer: MEDICARE

## 2023-08-07 VITALS
HEIGHT: 67 IN | BODY MASS INDEX: 21.03 KG/M2 | WEIGHT: 134 LBS | HEART RATE: 94 BPM | DIASTOLIC BLOOD PRESSURE: 86 MMHG | SYSTOLIC BLOOD PRESSURE: 126 MMHG | OXYGEN SATURATION: 98 %

## 2023-08-07 DIAGNOSIS — K40.90 LEFT INGUINAL HERNIA: ICD-10-CM

## 2023-08-07 DIAGNOSIS — G89.29 CHRONIC GENERALIZED PAIN: ICD-10-CM

## 2023-08-07 DIAGNOSIS — R52 CHRONIC GENERALIZED PAIN: ICD-10-CM

## 2023-08-07 DIAGNOSIS — I48.21 PERMANENT ATRIAL FIBRILLATION (HCC): ICD-10-CM

## 2023-08-07 DIAGNOSIS — I27.20 PULMONARY HTN (HCC): ICD-10-CM

## 2023-08-07 DIAGNOSIS — K74.60 CIRRHOSIS OF LIVER WITHOUT ASCITES, UNSPECIFIED HEPATIC CIRRHOSIS TYPE (HCC): ICD-10-CM

## 2023-08-07 DIAGNOSIS — I10 PRIMARY HYPERTENSION: ICD-10-CM

## 2023-08-07 DIAGNOSIS — Z78.0 POST-MENOPAUSAL: Primary | ICD-10-CM

## 2023-08-07 DIAGNOSIS — I83.891 VARICOSE VEINS OF RIGHT LEG WITH EDEMA: ICD-10-CM

## 2023-08-07 PROCEDURE — 3074F SYST BP LT 130 MM HG: CPT | Performed by: INTERNAL MEDICINE

## 2023-08-07 PROCEDURE — 1123F ACP DISCUSS/DSCN MKR DOCD: CPT | Performed by: INTERNAL MEDICINE

## 2023-08-07 PROCEDURE — 3079F DIAST BP 80-89 MM HG: CPT | Performed by: INTERNAL MEDICINE

## 2023-08-07 PROCEDURE — 99215 OFFICE O/P EST HI 40 MIN: CPT | Performed by: INTERNAL MEDICINE

## 2023-08-07 RX ORDER — TRAMADOL HYDROCHLORIDE 50 MG/1
50 TABLET ORAL 2 TIMES DAILY
Qty: 60 TABLET | Refills: 0 | Status: SHIPPED | OUTPATIENT
Start: 2023-08-07 | End: 2023-09-06

## 2023-08-07 ASSESSMENT — ENCOUNTER SYMPTOMS
SHORTNESS OF BREATH: 0
BLURRED VISION: 0
RESPIRATORY NEGATIVE: 1
EYES NEGATIVE: 1
GASTROINTESTINAL NEGATIVE: 1
ORTHOPNEA: 0

## 2023-08-08 ENCOUNTER — HOSPITAL ENCOUNTER (OUTPATIENT)
Dept: NUCLEAR MEDICINE | Age: 83
Discharge: HOME | End: 2023-08-10
Payer: MEDICARE

## 2023-08-08 DIAGNOSIS — K74.60 CIRRHOSIS OF LIVER WITHOUT ASCITES, UNSPECIFIED HEPATIC CIRRHOSIS TYPE (HCC): ICD-10-CM

## 2023-08-08 PROCEDURE — A9541 TC99M SULFUR COLLOID: HCPCS | Performed by: NURSE PRACTITIONER

## 2023-08-08 PROCEDURE — 3430000000 HC RX DIAGNOSTIC RADIOPHARMACEUTICAL: Performed by: NURSE PRACTITIONER

## 2023-08-08 PROCEDURE — 78215 LVR&SPLEEN IMG STATIC ONLY: CPT

## 2023-08-08 PROCEDURE — 2580000003 HC RX 258: Performed by: NURSE PRACTITIONER

## 2023-08-08 RX ORDER — SODIUM CHLORIDE 0.9 % (FLUSH) 0.9 %
10 SYRINGE (ML) INJECTION PRN
Status: DISCONTINUED | OUTPATIENT
Start: 2023-08-08 | End: 2023-08-11 | Stop reason: HOSPADM

## 2023-08-08 RX ORDER — TECHNETIUM TC 99M SULFUR COLLOID 2 MG
8 KIT MISCELLANEOUS ONCE
Status: COMPLETED | OUTPATIENT
Start: 2023-08-08 | End: 2023-08-08

## 2023-08-08 RX ADMIN — TECHNETIUM TC 99M SULFUR COLLOID 8.3 MILLICURIE: KIT at 11:26

## 2023-08-08 RX ADMIN — SODIUM CHLORIDE, PRESERVATIVE FREE 10 ML: 5 INJECTION INTRAVENOUS at 11:26

## 2023-08-19 DIAGNOSIS — I10 ESSENTIAL HYPERTENSION: ICD-10-CM

## 2023-08-19 DIAGNOSIS — F41.9 ANXIETY: ICD-10-CM

## 2023-08-21 RX ORDER — LISINOPRIL 10 MG/1
TABLET ORAL
Qty: 360 TABLET | Refills: 0 | Status: SHIPPED | OUTPATIENT
Start: 2023-08-21

## 2023-08-21 RX ORDER — LORAZEPAM 1 MG/1
1 TABLET ORAL 3 TIMES DAILY PRN
Qty: 270 TABLET | Refills: 0 | Status: SHIPPED | OUTPATIENT
Start: 2023-08-21 | End: 2023-11-19

## 2023-08-21 NOTE — TELEPHONE ENCOUNTER
Medication Requested: Ativan    Last visit: 08/07/2023  Next visit: 8/19/2023  Last refill: 05/22/2023    Med contract on file:  [] yes   [] no    Last urine drug screen: 11/15/2019  Consistent with medication(s):    [x] yes   [] no    Last OARRS ran:     Quantity of medication remaining:     Who will be picking rx up:     Pharmacy if escribed:

## 2023-09-05 ENCOUNTER — OFFICE VISIT (OUTPATIENT)
Dept: INTERNAL MEDICINE CLINIC | Age: 83
End: 2023-09-05
Payer: MEDICARE

## 2023-09-05 VITALS
HEIGHT: 66 IN | DIASTOLIC BLOOD PRESSURE: 76 MMHG | SYSTOLIC BLOOD PRESSURE: 124 MMHG | BODY MASS INDEX: 21.53 KG/M2 | OXYGEN SATURATION: 70 % | HEART RATE: 74 BPM | WEIGHT: 134 LBS

## 2023-09-05 DIAGNOSIS — I10 PRIMARY HYPERTENSION: ICD-10-CM

## 2023-09-05 DIAGNOSIS — Z78.0 POST-MENOPAUSAL: Primary | ICD-10-CM

## 2023-09-05 DIAGNOSIS — K40.90 LEFT INGUINAL HERNIA: ICD-10-CM

## 2023-09-05 DIAGNOSIS — I83.891 VARICOSE VEINS OF RIGHT LEG WITH EDEMA: ICD-10-CM

## 2023-09-05 DIAGNOSIS — I27.20 PULMONARY HTN (HCC): ICD-10-CM

## 2023-09-05 DIAGNOSIS — I48.21 PERMANENT ATRIAL FIBRILLATION (HCC): ICD-10-CM

## 2023-09-05 DIAGNOSIS — K74.60 CIRRHOSIS OF LIVER WITHOUT ASCITES, UNSPECIFIED HEPATIC CIRRHOSIS TYPE (HCC): ICD-10-CM

## 2023-09-05 PROCEDURE — 3074F SYST BP LT 130 MM HG: CPT | Performed by: INTERNAL MEDICINE

## 2023-09-05 PROCEDURE — 99213 OFFICE O/P EST LOW 20 MIN: CPT | Performed by: INTERNAL MEDICINE

## 2023-09-05 PROCEDURE — 3078F DIAST BP <80 MM HG: CPT | Performed by: INTERNAL MEDICINE

## 2023-09-05 PROCEDURE — 1123F ACP DISCUSS/DSCN MKR DOCD: CPT | Performed by: INTERNAL MEDICINE

## 2023-09-05 ASSESSMENT — ENCOUNTER SYMPTOMS
BLURRED VISION: 0
EYES NEGATIVE: 1
RESPIRATORY NEGATIVE: 1
SHORTNESS OF BREATH: 0
GASTROINTESTINAL NEGATIVE: 1
ORTHOPNEA: 0

## 2023-09-05 NOTE — PROGRESS NOTES
future       Return in about 6 months (around 3/5/2024) for Follow Up, hypertension, cirrhosis of liver. Orders Placed This Encounter   Procedures    DEXA Bone Density Axial Skeleton     Standing Status:   Future     Standing Expiration Date:   9/5/2024     No orders of the defined types were placed in this encounter. Multiple problems were reviewed her medications are reviewed she was advised regarding the side effects and she was advised that she can have repair of left inguinal hernia next year    Visit Information    Have you changed or started any medications since your last visit including any over-the-counter medicines, vitamins, or herbal medicines? no   Are you having any side effects from any of your medications? -  no  Have you stopped taking any of your medications? Is so, why? -  no    Have you seen any other physician or provider since your last visit? No  Have you had any other diagnostic tests since your last visit? No  Have you been seen in the emergency room and/or had an admission to a hospital since we last saw you? No  Have you had your routine dental cleaning in the past 6 months? no    Have you activated your [employer] account? If not, what are your barriers?  Yes     Patient Care Team:  Moustapha Baca MD as PCP - Madison Raymond MD as PCP - Empaneled Provider  Janett Leyden, MD as Consulting Physician (Gastroenterology)    Medical History Review  Past Medical, Family, and Social History reviewed and does not contribute to the patient presenting condition    Health Maintenance   Topic Date Due    Hepatitis A vaccine (1 of 2 - Risk 2-dose series) Never done    Hepatitis B vaccine (1 of 3 - Risk 3-dose series) Never done    DTaP/Tdap/Td vaccine (1 - Tdap) Never done    DEXA (modify frequency per FRAX score)  Never done    Annual Wellness Visit (AWV)  12/15/2021    COVID-19 Vaccine (4 - Booster for Pfizer series) 02/01/2022    Flu vaccine (1) 08/01/2023    Depression Screen

## 2023-09-11 DIAGNOSIS — G89.29 CHRONIC GENERALIZED PAIN: ICD-10-CM

## 2023-09-11 DIAGNOSIS — R52 CHRONIC GENERALIZED PAIN: ICD-10-CM

## 2023-09-11 DIAGNOSIS — I10 ESSENTIAL HYPERTENSION: ICD-10-CM

## 2023-09-11 RX ORDER — TRAMADOL HYDROCHLORIDE 50 MG/1
50 TABLET ORAL 2 TIMES DAILY
Qty: 60 TABLET | Refills: 0 | Status: SHIPPED | OUTPATIENT
Start: 2023-09-11 | End: 2023-10-11

## 2023-10-09 DIAGNOSIS — R52 CHRONIC GENERALIZED PAIN: ICD-10-CM

## 2023-10-09 DIAGNOSIS — G89.29 CHRONIC GENERALIZED PAIN: ICD-10-CM

## 2023-10-09 RX ORDER — TRAMADOL HYDROCHLORIDE 50 MG/1
50 TABLET ORAL 2 TIMES DAILY
Qty: 60 TABLET | Refills: 0 | Status: SHIPPED | OUTPATIENT
Start: 2023-10-09 | End: 2023-11-08

## 2023-10-09 NOTE — TELEPHONE ENCOUNTER
Medication Requested: Tramadol    Last visit: 9/5/23  Next visit: Visit date not found  Last refill: 9/11/23    Med contract on file:  [x] yes   [] no    Last urine drug screen:   Consistent with medication(s):    [x] yes   [] no    Last OARRS ran:     Quantity of medication remaining: 3 days 6 pills    Who will be picking rx up: self    Pharmacy if escribed: [facility]      Please advise Dr. Divina Poole is on [facility]

## 2023-11-09 DIAGNOSIS — R52 CHRONIC GENERALIZED PAIN: ICD-10-CM

## 2023-11-09 DIAGNOSIS — G89.29 CHRONIC GENERALIZED PAIN: ICD-10-CM

## 2023-11-09 NOTE — TELEPHONE ENCOUNTER
Medication Requested: Tramdol    Last visit: 9/5/23  Next visit: Visit date not found  Last refill: 10/9/23    Med contract on file:  [x] yes   [] No  7/16/19    Last urine drug screen: 11/15/2019  Consistent with medication(s):    [x] yes   [] no    Last OARRS ran: unknown    Quantity of medication remaining: 3 days worth    Who will be picking rx up: Delivered to patient    Pharmacy if escribed: [facility]

## 2023-11-10 RX ORDER — TRAMADOL HYDROCHLORIDE 50 MG/1
50 TABLET ORAL 2 TIMES DAILY
Qty: 60 TABLET | Refills: 0 | Status: SHIPPED | OUTPATIENT
Start: 2023-11-10 | End: 2023-12-10

## 2023-11-14 ENCOUNTER — TELEPHONE (OUTPATIENT)
Dept: INTERNAL MEDICINE CLINIC | Age: 83
End: 2023-11-14

## 2023-11-18 DIAGNOSIS — F41.9 ANXIETY: ICD-10-CM

## 2023-11-18 DIAGNOSIS — I10 ESSENTIAL HYPERTENSION: ICD-10-CM

## 2023-11-20 NOTE — TELEPHONE ENCOUNTER
Medication Requested: Ativan    Last visit: 2023  Next visit: Visit date not found  Last refill: 2023    Med contract on file:  [x] yes   [] no    Last urine drug screen: 11/15/2019  Consistent with medication(s):    [] yes   [] no    Last OARRS ran: unknown    Quantity of medication remainin    Who will be picking rx up: patient    Pharmacy if escribed: [facility]

## 2023-11-21 RX ORDER — LORAZEPAM 1 MG/1
1 TABLET ORAL 3 TIMES DAILY PRN
Qty: 270 TABLET | Refills: 0 | Status: SHIPPED | OUTPATIENT
Start: 2023-11-21 | End: 2024-02-19

## 2023-11-23 NOTE — TELEPHONE ENCOUNTER
Medication Requested: ativan, tramadol    Last visit: 05/24/17  Next visit: 11/28/2017  Last refill: 10/17/17    Med contract on file:  [x] yes   [] no    Last urine drug screen: 07/26/17  Consistent with medication(s):    [x] yes   [] no    Last OARRS ran: needs run, been over 3 months    Quantity of medication remaining: \"enough until refill date\"    Who will be picking rx up: self    Pharmacy if escribed: wants to 
Oarrs ran on 11/15/17
Patient informed
Attending Note   Pt progressed to 10/100/+3   Peds present, additional nursing and 2nd attending present for concern for shoulder dystocia  Pt pushed from + 2 station with good descent with pushing  Vertex delivered over intact perineum   Tight nuchal cord noted   Attempted to deliver anterior shoulder (left) and no movement   Code ob called   Pt instructed to stop pushing  Ge and suprapubic pressure applied with no movement of anterior shoulder   Posterior arm delivered and rest of the baby delivered  Cord clamp and cut   Baby handed to pediatrician  Vagina, rectum and cervix inspected  no Laceration noted   Uterine atony noted and received double pitocin and cytotec   Events of delivery reviewed with the patient, patient 's  and patient's mother   Questions solicited and answered to the best of my ability

## 2023-12-11 DIAGNOSIS — G89.29 CHRONIC GENERALIZED PAIN: ICD-10-CM

## 2023-12-11 DIAGNOSIS — R52 CHRONIC GENERALIZED PAIN: ICD-10-CM

## 2023-12-11 NOTE — TELEPHONE ENCOUNTER
Medication Requested: Tramadol     Last visit: 2023  Next visit: Visit date not found  Last refill: 11/10/2023    Med contract on file:  [x] yes   [] no    Last urine drug screen: 2019  Consistent with medication(s):    [x] yes   [] no    Last OARRS ran:    Quantity of medication remainin tablets     Who will be picking rx up: Patient     Pharmacy if escribed: [facility]

## 2023-12-12 RX ORDER — TRAMADOL HYDROCHLORIDE 50 MG/1
50 TABLET ORAL EVERY 6 HOURS PRN
Qty: 120 TABLET | Refills: 0 | Status: SHIPPED | OUTPATIENT
Start: 2023-12-12 | End: 2024-01-11

## 2024-01-10 ENCOUNTER — TELEPHONE (OUTPATIENT)
Dept: INTERNAL MEDICINE CLINIC | Age: 84
End: 2024-01-10

## 2024-01-10 NOTE — TELEPHONE ENCOUNTER
Medical surgical clearance request received 01/10/24    Surgeon: Dr Hayden    Procedure: left conjunctival mass excisional biopsy surgery     Date of Procedure: 2/6/24    Last appt: 9/5/2023    Next appt: Visit date not found    PATs received:    [] yes   [x] no

## 2024-01-30 ENCOUNTER — OFFICE VISIT (OUTPATIENT)
Dept: INTERNAL MEDICINE CLINIC | Age: 84
End: 2024-01-30
Payer: MEDICARE

## 2024-01-30 VITALS
HEIGHT: 67 IN | DIASTOLIC BLOOD PRESSURE: 62 MMHG | WEIGHT: 128 LBS | BODY MASS INDEX: 20.09 KG/M2 | SYSTOLIC BLOOD PRESSURE: 110 MMHG | HEART RATE: 74 BPM | OXYGEN SATURATION: 97 %

## 2024-01-30 DIAGNOSIS — I10 ESSENTIAL HYPERTENSION: ICD-10-CM

## 2024-01-30 DIAGNOSIS — K59.00 CONSTIPATION, UNSPECIFIED CONSTIPATION TYPE: ICD-10-CM

## 2024-01-30 DIAGNOSIS — K74.60 CIRRHOSIS OF LIVER WITHOUT ASCITES, UNSPECIFIED HEPATIC CIRRHOSIS TYPE (HCC): ICD-10-CM

## 2024-01-30 DIAGNOSIS — I83.93 VARICOSE VEINS OF BOTH LOWER EXTREMITIES, UNSPECIFIED WHETHER COMPLICATED: ICD-10-CM

## 2024-01-30 DIAGNOSIS — I48.21 PERMANENT ATRIAL FIBRILLATION (HCC): ICD-10-CM

## 2024-01-30 DIAGNOSIS — Z78.0 POST-MENOPAUSAL: Primary | ICD-10-CM

## 2024-01-30 DIAGNOSIS — I27.20 PULMONARY HTN (HCC): ICD-10-CM

## 2024-01-30 PROCEDURE — 99214 OFFICE O/P EST MOD 30 MIN: CPT | Performed by: INTERNAL MEDICINE

## 2024-01-30 PROCEDURE — 3074F SYST BP LT 130 MM HG: CPT | Performed by: INTERNAL MEDICINE

## 2024-01-30 PROCEDURE — 1123F ACP DISCUSS/DSCN MKR DOCD: CPT | Performed by: INTERNAL MEDICINE

## 2024-01-30 PROCEDURE — 3078F DIAST BP <80 MM HG: CPT | Performed by: INTERNAL MEDICINE

## 2024-01-30 RX ORDER — DOCUSATE SODIUM 100 MG/1
100 CAPSULE, LIQUID FILLED ORAL NIGHTLY
Qty: 30 CAPSULE | Refills: 0 | Status: SHIPPED | OUTPATIENT
Start: 2024-01-30 | End: 2024-01-30 | Stop reason: SDUPTHER

## 2024-01-30 RX ORDER — LISINOPRIL 10 MG/1
TABLET ORAL
Qty: 360 TABLET | Refills: 0 | Status: SHIPPED | OUTPATIENT
Start: 2024-01-30

## 2024-01-30 RX ORDER — LISINOPRIL 10 MG/1
TABLET ORAL
Qty: 360 TABLET | Refills: 0 | Status: SHIPPED | OUTPATIENT
Start: 2024-01-30 | End: 2024-01-30 | Stop reason: SDUPTHER

## 2024-01-30 RX ORDER — DOCUSATE SODIUM 100 MG/1
100 CAPSULE, LIQUID FILLED ORAL NIGHTLY
Qty: 30 CAPSULE | Refills: 0 | Status: SHIPPED | OUTPATIENT
Start: 2024-01-30 | End: 2024-02-29

## 2024-01-30 ASSESSMENT — PATIENT HEALTH QUESTIONNAIRE - PHQ9
1. LITTLE INTEREST OR PLEASURE IN DOING THINGS: 1
SUM OF ALL RESPONSES TO PHQ QUESTIONS 1-9: 2
SUM OF ALL RESPONSES TO PHQ QUESTIONS 1-9: 2
2. FEELING DOWN, DEPRESSED OR HOPELESS: 1
SUM OF ALL RESPONSES TO PHQ QUESTIONS 1-9: 2
SUM OF ALL RESPONSES TO PHQ QUESTIONS 1-9: 2

## 2024-01-30 NOTE — PROGRESS NOTES
behavioral problems, confusion, decreased concentration and dysphoric mood.        Objective:   Physical Exam  Constitutional:       Appearance: She is well-developed. She is not diaphoretic.   HENT:      Head: Normocephalic and atraumatic.      Comments: Raised lesion around 5 mm , Circular present in left eyelid      Mouth/Throat:      Pharynx: No oropharyngeal exudate.   Eyes:      General: No scleral icterus.        Right eye: No discharge.         Left eye: No discharge.      Conjunctiva/sclera: Conjunctivae normal.      Pupils: Pupils are equal, round, and reactive to light.   Neck:      Thyroid: No thyromegaly.      Vascular: No JVD.      Trachea: No tracheal deviation.   Cardiovascular:      Rate and Rhythm: Normal rate.      Heart sounds: Normal heart sounds. No murmur heard.     No gallop.   Pulmonary:      Effort: Pulmonary effort is normal. No respiratory distress.      Breath sounds: Normal breath sounds. No stridor. No wheezing or rales.   Chest:      Chest wall: No tenderness.   Abdominal:      General: Bowel sounds are normal. There is no distension.      Palpations: Abdomen is soft.      Tenderness: There is no abdominal tenderness. There is no guarding or rebound.   Musculoskeletal:         General: Normal range of motion.      Cervical back: Normal range of motion and neck supple.   Neurological:      Mental Status: She is alert and oriented to person, place, and time.         Assessment / Plan:   1. Post-menopausal  - DEXA Bone Density Axial Skeleton; Future    2. Permanent atrial fibrillation (HCC)  Stable     3. Cirrhosis of liver without ascites, unspecified hepatic cirrhosis type (HCC)  Stable   Follows with GI     4. Essential hypertension  Controlled     5. Constipation, unspecified constipation type  Stable     6. Pulmonary HTN (HCC)  Controlled     7. Varicose veins of both lower extremities, unspecified whether complicated  Advice to use compression stocking     Explained to patient with

## 2024-02-03 ASSESSMENT — ENCOUNTER SYMPTOMS
EYE ITCHING: 0
BACK PAIN: 0
CHEST TIGHTNESS: 0
APNEA: 0
COUGH: 0
BLOOD IN STOOL: 0
DIARRHEA: 0
SHORTNESS OF BREATH: 0
ABDOMINAL DISTENTION: 0
EYE PAIN: 0
EYE DISCHARGE: 0
EYE REDNESS: 0
CHOKING: 0
ABDOMINAL PAIN: 0
CONSTIPATION: 0
COLOR CHANGE: 0

## 2024-02-13 DIAGNOSIS — R52 CHRONIC GENERALIZED PAIN: ICD-10-CM

## 2024-02-13 DIAGNOSIS — G89.29 CHRONIC GENERALIZED PAIN: ICD-10-CM

## 2024-02-13 RX ORDER — TRAMADOL HYDROCHLORIDE 50 MG/1
50 TABLET ORAL EVERY 6 HOURS PRN
Qty: 120 TABLET | Refills: 0 | Status: SHIPPED | OUTPATIENT
Start: 2024-02-13 | End: 2024-02-15 | Stop reason: SDUPTHER

## 2024-02-13 NOTE — TELEPHONE ENCOUNTER
Medication Requested: tramadol    Last visit: 1/30/204  Next visit: 4/30/2024  Last refill: 12/12/2023    Med contract on file:  [x] yes   [] No   Needs to be updated    Last urine drug screen: 1/2/2019  Consistent with medication(s):    [] yes   [] no    Last OARRS ran: shreya    Quantity of medication remaining: ?    Who will be picking rx up:     Pharmacy [facility] escribed[facility]

## 2024-02-15 DIAGNOSIS — R52 CHRONIC GENERALIZED PAIN: ICD-10-CM

## 2024-02-15 DIAGNOSIS — G89.29 CHRONIC GENERALIZED PAIN: ICD-10-CM

## 2024-02-15 RX ORDER — TRAMADOL HYDROCHLORIDE 50 MG/1
50 TABLET ORAL EVERY 6 HOURS PRN
Qty: 120 TABLET | Refills: 0 | Status: SHIPPED | OUTPATIENT
Start: 2024-02-15 | End: 2024-03-16

## 2024-02-15 NOTE — TELEPHONE ENCOUNTER
LV 1/30/2024  NV 4/30/2024    Script was sent to the wrong pharmacy on the 12 th. Patient is currently out of the medication.    Please send new script to [facility]  Script cancelled at [facility]

## 2024-03-20 DIAGNOSIS — F41.9 ANXIETY: ICD-10-CM

## 2024-03-20 DIAGNOSIS — I10 ESSENTIAL HYPERTENSION: ICD-10-CM

## 2024-03-20 RX ORDER — LORAZEPAM 1 MG/1
1 TABLET ORAL DAILY PRN
Qty: 30 TABLET | Refills: 0 | Status: SHIPPED | OUTPATIENT
Start: 2024-03-20 | End: 2024-04-19

## 2024-04-12 ENCOUNTER — APPOINTMENT (OUTPATIENT)
Dept: CT IMAGING | Age: 84
End: 2024-04-12
Payer: MEDICARE

## 2024-04-12 ENCOUNTER — HOSPITAL ENCOUNTER (EMERGENCY)
Age: 84
Discharge: HOME | End: 2024-04-12
Attending: STUDENT IN AN ORGANIZED HEALTH CARE EDUCATION/TRAINING PROGRAM
Payer: MEDICARE

## 2024-04-12 VITALS
DIASTOLIC BLOOD PRESSURE: 96 MMHG | TEMPERATURE: 97.9 F | BODY MASS INDEX: 20.35 KG/M2 | SYSTOLIC BLOOD PRESSURE: 113 MMHG | RESPIRATION RATE: 18 BRPM | OXYGEN SATURATION: 96 % | HEART RATE: 90 BPM | WEIGHT: 128 LBS
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
## 2024-04-12 PROCEDURE — 72125 CT NECK SPINE W/O DYE: CPT

## 2024-04-12 PROCEDURE — 70450 CT HEAD/BRAIN W/O DYE: CPT

## 2024-04-12 PROCEDURE — 99284 EMERGENCY DEPT VISIT MOD MDM: CPT

## 2024-04-12 PROCEDURE — 6370000000 HC RX 637 (ALT 250 FOR IP): Performed by: STUDENT IN AN ORGANIZED HEALTH CARE EDUCATION/TRAINING PROGRAM

## 2024-04-12 RX ORDER — ACETAMINOPHEN 325 MG/1
650 TABLET ORAL ONCE
Status: COMPLETED | OUTPATIENT
Start: 2024-04-12 | End: 2024-04-12

## 2024-04-12 RX ADMIN — ACETAMINOPHEN 650 MG: 325 TABLET ORAL at 22:04

## 2024-04-12 ASSESSMENT — ENCOUNTER SYMPTOMS
CONSTIPATION: 0
VOMITING: 0
SORE THROAT: 0
SINUS PRESSURE: 0
WHEEZING: 0
BACK PAIN: 0
NAUSEA: 0
DIARRHEA: 0
ABDOMINAL PAIN: 0
SHORTNESS OF BREATH: 0

## 2024-04-12 ASSESSMENT — PAIN SCALES - GENERAL: PAINLEVEL_OUTOF10: 5

## 2024-04-12 ASSESSMENT — PAIN DESCRIPTION - LOCATION: LOCATION: HEAD

## 2024-04-12 ASSESSMENT — PAIN - FUNCTIONAL ASSESSMENT: PAIN_FUNCTIONAL_ASSESSMENT: NONE - DENIES PAIN

## 2024-04-13 NOTE — ED PROVIDER NOTES
EMERGENCY DEPARTMENT ENCOUNTER   ATTENDING ATTESTATION     Pt Name: Trisha Freed  MRN: [id number]  Birthdate 1940  Date of evaluation: 4/12/24       Trisha Freed is a 83 y.o. female who presents with Head Injury    Mechanical fall with head injury    Alert and oriented no focal neurologic deficits    Plan is CT head C-spine    MDM:     Scans all negative    Will discharge with primary follow-up and return precautions    Vitals:   Vitals:    04/12/24 2121   BP: (!) 113/96   Pulse: 90   Resp: 18   Temp: 97.9 °F (36.6 °C)   TempSrc: Oral   SpO2: 96%   Weight: 58.1 kg (128 lb)         I personally saw and examined the patient. I have reviewed and agree with the resident's findings, including all diagnostic interpretations and treatment plan as written. I was present for the key portions of any procedures performed and the inclusive time noted for any critical care statement.    Prince St MD  Attending Emergency Physician           Prince St MD  04/12/24 7757    
cardiologist.    EMERGENCY DEPARTMENT COURSE:  Patient reevaluated.  She is feeling all right.  Imaging studies negative.  Patient is able to ambulate without difficulty.  Patient is instructed follow-up with her primary care provider in the next 1 to 2 weeks if necessary.  Patient discharged in stable condition.  She will return for any new or worsening symptoms         PROCEDURES:  None    CONSULTS:  None    CRITICAL CARE:  There was significant risk of life threatening deterioration of patient's condition requiring my direct management. Critical care time 0 minutes, excluding any documented procedures.    FINAL IMPRESSION      1. Accident due to mechanical fall without injury, initial encounter          DISPOSITION / PLAN     DISPOSITION Decision To Discharge 04/12/2024 11:30:38 PM      PATIENT REFERRED TO:  Felix Willis MD  [address] [address]  [phone]      As needed, If symptoms worsen    [facility]  [address] [address]  [phone]    As needed, If symptoms worsen      DISCHARGE MEDICATIONS:  Discharge Medication List as of 4/12/2024 11:37 PM          Robi Almendarez DO  Emergency Medicine Resident    (Please note that portions of thisnote were completed with a voice recognition program.  Efforts were made to edit the dictations but occasionally words are mis-transcribed.)

## 2024-04-13 NOTE — DISCHARGE INSTRUCTIONS
You are seen in emergency department for fall.  CT scans there is no injury.  Please follow-up with your primary care provider as necessary.  Please return emerged part for any new or worsening symptoms

## 2024-04-15 DIAGNOSIS — F41.9 ANXIETY: ICD-10-CM

## 2024-04-15 DIAGNOSIS — G89.29 CHRONIC GENERALIZED PAIN: ICD-10-CM

## 2024-04-15 DIAGNOSIS — R52 CHRONIC GENERALIZED PAIN: ICD-10-CM

## 2024-04-15 DIAGNOSIS — I10 ESSENTIAL HYPERTENSION: ICD-10-CM

## 2024-04-15 RX ORDER — TRAMADOL HYDROCHLORIDE 50 MG/1
50 TABLET ORAL EVERY 6 HOURS PRN
Qty: 120 TABLET | Refills: 0 | Status: SHIPPED | OUTPATIENT
Start: 2024-04-15 | End: 2024-05-15

## 2024-04-15 RX ORDER — LATANOPROST 50 UG/ML
2 SOLUTION/ DROPS OPHTHALMIC DAILY
Qty: 7.5 ML | Refills: 0 | Status: SHIPPED | OUTPATIENT
Start: 2024-04-15 | End: 2024-06-29

## 2024-04-15 RX ORDER — LORAZEPAM 1 MG/1
1 TABLET ORAL DAILY PRN
Qty: 30 TABLET | Refills: 0 | Status: SHIPPED | OUTPATIENT
Start: 2024-04-15 | End: 2024-05-15

## 2024-04-15 NOTE — TELEPHONE ENCOUNTER
Medication Requested: Ativan, Tramadol    Last visit: 1/30/24  Next visit: 4/30/2024  Last refill: 4/30/24    Med contract on file:  [] yes   [x] No     Last urine drug screen: 11/15/19  Consistent with medication(s):    [] yes   [] no    Last OARRS ran: unknown    Quantity of medication remaining: 3 pills     Who will be picking rx up: patient     Pharmacy if escribed[facility]

## 2024-04-30 ENCOUNTER — HOSPITAL ENCOUNTER (OUTPATIENT)
Age: 84
Setting detail: SPECIMEN
Discharge: HOME | End: 2024-04-30

## 2024-04-30 ENCOUNTER — OFFICE VISIT (OUTPATIENT)
Dept: INTERNAL MEDICINE CLINIC | Age: 84
End: 2024-04-30
Payer: MEDICARE

## 2024-04-30 VITALS
OXYGEN SATURATION: 97 % | SYSTOLIC BLOOD PRESSURE: 124 MMHG | WEIGHT: 126 LBS | HEIGHT: 67 IN | HEART RATE: 73 BPM | DIASTOLIC BLOOD PRESSURE: 86 MMHG | BODY MASS INDEX: 19.78 KG/M2

## 2024-04-30 DIAGNOSIS — F41.9 ANXIETY: ICD-10-CM

## 2024-04-30 DIAGNOSIS — I48.21 PERMANENT ATRIAL FIBRILLATION (HCC): ICD-10-CM

## 2024-04-30 DIAGNOSIS — K74.60 CIRRHOSIS OF LIVER WITHOUT ASCITES, UNSPECIFIED HEPATIC CIRRHOSIS TYPE (HCC): ICD-10-CM

## 2024-04-30 DIAGNOSIS — I10 HYPERTENSION, UNSPECIFIED TYPE: ICD-10-CM

## 2024-04-30 DIAGNOSIS — E63.9 INADEQUATE NUTRITION: ICD-10-CM

## 2024-04-30 DIAGNOSIS — R32 INCONTINENCE IN FEMALE: ICD-10-CM

## 2024-04-30 DIAGNOSIS — Z76.89 ENCOUNTER TO ESTABLISH CARE: Primary | ICD-10-CM

## 2024-04-30 DIAGNOSIS — N30.01 ACUTE CYSTITIS WITH HEMATURIA: ICD-10-CM

## 2024-04-30 DIAGNOSIS — E78.2 MIXED HYPERLIPIDEMIA: ICD-10-CM

## 2024-04-30 DIAGNOSIS — I27.20 PULMONARY HTN (HCC): ICD-10-CM

## 2024-04-30 DIAGNOSIS — G89.29 OTHER CHRONIC PAIN: ICD-10-CM

## 2024-04-30 PROCEDURE — 99214 OFFICE O/P EST MOD 30 MIN: CPT | Performed by: NURSE PRACTITIONER

## 2024-04-30 PROCEDURE — 3074F SYST BP LT 130 MM HG: CPT | Performed by: NURSE PRACTITIONER

## 2024-04-30 PROCEDURE — 3079F DIAST BP 80-89 MM HG: CPT | Performed by: NURSE PRACTITIONER

## 2024-04-30 PROCEDURE — 1123F ACP DISCUSS/DSCN MKR DOCD: CPT | Performed by: NURSE PRACTITIONER

## 2024-04-30 RX ORDER — TOBRAMYCIN AND DEXAMETHASONE 3; 1 MG/ML; MG/ML
SUSPENSION/ DROPS OPHTHALMIC
COMMUNITY
Start: 2024-02-07

## 2024-04-30 ASSESSMENT — ENCOUNTER SYMPTOMS
WHEEZING: 0
NAUSEA: 0
DIARRHEA: 0
TROUBLE SWALLOWING: 0
SHORTNESS OF BREATH: 0
EYE DISCHARGE: 0
ABDOMINAL PAIN: 0
COUGH: 0
CONSTIPATION: 1

## 2024-04-30 NOTE — PROGRESS NOTES
Visit Information    Have you changed or started any medications since your last visit including any over-the-counter medicines, vitamins, or herbal medicines? no   Are you having any side effects from any of your medications? -  no  Have you stopped taking any of your medications? Is so, why? -  no    Have you seen any other physician or provider since your last visit? No  Have you had any other diagnostic tests since your last visit? No  Have you been seen in the emergency room and/or had an admission to a hospital since we last saw you? No  Have you had your routine dental cleaning in the past 6 months? no    Have you activated your [employer] account? If not, what are your barriers? Yes     Patient Care Team:  Carla Serrano APRN - CNP as PCP - General (Internal Medicine)  Carla Serrano APRN - CNP as PCP - Empaneled Provider  Jesus Rivera MD as Consulting Physician (Gastroenterology)    Medical History Review  Past Medical, Family, and Social History reviewed and does contribute to the patient presenting condition    Health Maintenance   Topic Date Due    Hepatitis A vaccine (1 of 2 - Risk 2-dose series) Never done    DTaP/Tdap/Td vaccine (1 - Tdap) Never done    DEXA (modify frequency per FRAX score)  Never done    Hepatitis B vaccine (1 of 3 - Risk 3-dose series) Never done    Respiratory Syncytial Virus (RSV) Pregnant or age 60 yrs+ (1 - 1-dose 60+ series) Never done    COVID-19 Vaccine (4 - 2023-24 season) 09/01/2023    Annual Wellness Visit (Medicare Advantage)  01/01/2024    Depression Screen  01/30/2025    Flu vaccine  Completed    Shingles vaccine  Completed    Pneumococcal 65+ years Vaccine  Completed    Hib vaccine  Aged Out    Polio vaccine  Aged Out    Meningococcal (ACWY) vaccine  Aged Out    Hepatitis C screen  Discontinued        [facility]  [address] [address]  Dept: [phone]  Dept Fax: [phone]    Office Progress/Follow Up

## 2024-05-01 LAB
BACTERIA URNS QL MICRO: NORMAL
BILIRUB UR QL STRIP: NEGATIVE
CASTS #/AREA URNS LPF: NORMAL /LPF (ref 0–8)
CLARITY UR: ABNORMAL
COLOR UR: YELLOW
EPI CELLS #/AREA URNS HPF: NORMAL /HPF (ref 0–5)
GLUCOSE UR STRIP-MCNC: NEGATIVE MG/DL
HGB UR QL STRIP.AUTO: ABNORMAL
KETONES UR STRIP-MCNC: NEGATIVE MG/DL
LEUKOCYTE ESTERASE UR QL STRIP: ABNORMAL
NITRITE UR QL STRIP: NEGATIVE
PH UR STRIP: 5.5 [PH] (ref 5–8)
PROT UR STRIP-MCNC: NEGATIVE MG/DL
RBC #/AREA URNS HPF: NORMAL /HPF (ref 0–4)
SP GR UR STRIP: 1.02 (ref 1–1.03)
UROBILINOGEN UR STRIP-ACNC: NORMAL EU/DL (ref 0–1)
WBC #/AREA URNS HPF: NORMAL /HPF (ref 0–5)

## 2024-05-02 LAB
MICROORGANISM SPEC CULT: ABNORMAL
SPECIMEN DESCRIPTION: ABNORMAL

## 2024-05-02 RX ORDER — NITROFURANTOIN 25; 75 MG/1; MG/1
100 CAPSULE ORAL 2 TIMES DAILY
Qty: 10 CAPSULE | Refills: 0 | Status: SHIPPED | OUTPATIENT
Start: 2024-05-02 | End: 2024-05-07

## 2024-05-16 DIAGNOSIS — I10 ESSENTIAL HYPERTENSION: ICD-10-CM

## 2024-05-16 DIAGNOSIS — F41.9 ANXIETY: ICD-10-CM

## 2024-05-16 RX ORDER — LORAZEPAM 1 MG/1
1 TABLET ORAL DAILY PRN
Qty: 30 TABLET | Refills: 0 | Status: SHIPPED | OUTPATIENT
Start: 2024-05-16 | End: 2024-06-15

## 2024-05-16 NOTE — TELEPHONE ENCOUNTER
Medication Requested: Ativan    Last visit: 4/3/2024  Next visit: 2024  Last refill: 4/15/2024    Med contract on file:  [x] yes   [] No   NEEDS UPDATED    Last urine drug screen: unknown  Consistent with medication(s):    [] yes   [] no    Last OARRS ran: unknown    Quantity of medication remainin    Who will be picking rx up: patient     Pharmacy if escribed: [facility]

## 2024-05-28 ENCOUNTER — TELEPHONE (OUTPATIENT)
Dept: INTERNAL MEDICINE CLINIC | Age: 84
End: 2024-05-28

## 2024-06-04 ENCOUNTER — TELEPHONE (OUTPATIENT)
Dept: INTERNAL MEDICINE CLINIC | Age: 84
End: 2024-06-04

## 2024-06-04 ENCOUNTER — OFFICE VISIT (OUTPATIENT)
Dept: INTERNAL MEDICINE CLINIC | Age: 84
End: 2024-06-04
Payer: MEDICARE

## 2024-06-04 VITALS
HEIGHT: 66 IN | OXYGEN SATURATION: 97 % | SYSTOLIC BLOOD PRESSURE: 136 MMHG | WEIGHT: 124.8 LBS | HEART RATE: 67 BPM | DIASTOLIC BLOOD PRESSURE: 78 MMHG | BODY MASS INDEX: 20.06 KG/M2

## 2024-06-04 DIAGNOSIS — M54.9 CHRONIC BACK PAIN, UNSPECIFIED BACK LOCATION, UNSPECIFIED BACK PAIN LATERALITY: ICD-10-CM

## 2024-06-04 DIAGNOSIS — E46 PROTEIN-CALORIE MALNUTRITION, UNSPECIFIED SEVERITY (HCC): ICD-10-CM

## 2024-06-04 DIAGNOSIS — Z78.0 POST-MENOPAUSAL: ICD-10-CM

## 2024-06-04 DIAGNOSIS — I48.21 PERMANENT ATRIAL FIBRILLATION (HCC): Primary | ICD-10-CM

## 2024-06-04 DIAGNOSIS — I27.20 PULMONARY HTN (HCC): ICD-10-CM

## 2024-06-04 DIAGNOSIS — G89.29 CHRONIC BACK PAIN, UNSPECIFIED BACK LOCATION, UNSPECIFIED BACK PAIN LATERALITY: ICD-10-CM

## 2024-06-04 DIAGNOSIS — Z91.81 AT HIGH RISK FOR FALLS: ICD-10-CM

## 2024-06-04 DIAGNOSIS — I10 ESSENTIAL HYPERTENSION: ICD-10-CM

## 2024-06-04 DIAGNOSIS — K74.60 CIRRHOSIS OF LIVER WITHOUT ASCITES, UNSPECIFIED HEPATIC CIRRHOSIS TYPE (HCC): ICD-10-CM

## 2024-06-04 PROCEDURE — 3075F SYST BP GE 130 - 139MM HG: CPT | Performed by: INTERNAL MEDICINE

## 2024-06-04 PROCEDURE — 99214 OFFICE O/P EST MOD 30 MIN: CPT | Performed by: INTERNAL MEDICINE

## 2024-06-04 PROCEDURE — 1123F ACP DISCUSS/DSCN MKR DOCD: CPT | Performed by: INTERNAL MEDICINE

## 2024-06-04 PROCEDURE — 3078F DIAST BP <80 MM HG: CPT | Performed by: INTERNAL MEDICINE

## 2024-06-04 RX ORDER — MIRTAZAPINE 15 MG/1
15 TABLET, FILM COATED ORAL NIGHTLY
Qty: 30 TABLET | Refills: 3 | Status: SHIPPED | OUTPATIENT
Start: 2024-06-04 | End: 2024-06-04 | Stop reason: ALTCHOICE

## 2024-06-04 SDOH — ECONOMIC STABILITY: FOOD INSECURITY: WITHIN THE PAST 12 MONTHS, YOU WORRIED THAT YOUR FOOD WOULD RUN OUT BEFORE YOU GOT MONEY TO BUY MORE.: PATIENT DECLINED

## 2024-06-04 SDOH — ECONOMIC STABILITY: FOOD INSECURITY: WITHIN THE PAST 12 MONTHS, THE FOOD YOU BOUGHT JUST DIDN'T LAST AND YOU DIDN'T HAVE MONEY TO GET MORE.: PATIENT DECLINED

## 2024-06-04 SDOH — ECONOMIC STABILITY: HOUSING INSECURITY
IN THE LAST 12 MONTHS, WAS THERE A TIME WHEN YOU DID NOT HAVE A STEADY PLACE TO SLEEP OR SLEPT IN A SHELTER (INCLUDING NOW)?: PATIENT DECLINED

## 2024-06-04 SDOH — ECONOMIC STABILITY: INCOME INSECURITY: HOW HARD IS IT FOR YOU TO PAY FOR THE VERY BASICS LIKE FOOD, HOUSING, MEDICAL CARE, AND HEATING?: PATIENT DECLINED

## 2024-06-04 ASSESSMENT — PATIENT HEALTH QUESTIONNAIRE - PHQ9
SUM OF ALL RESPONSES TO PHQ QUESTIONS 1-9: 0
1. LITTLE INTEREST OR PLEASURE IN DOING THINGS: NOT AT ALL
SUM OF ALL RESPONSES TO PHQ QUESTIONS 1-9: 0
SUM OF ALL RESPONSES TO PHQ QUESTIONS 1-9: 0
SUM OF ALL RESPONSES TO PHQ9 QUESTIONS 1 & 2: 0
SUM OF ALL RESPONSES TO PHQ QUESTIONS 1-9: 0
2. FEELING DOWN, DEPRESSED OR HOPELESS: NOT AT ALL

## 2024-06-04 NOTE — TELEPHONE ENCOUNTER
Mirtazapine 15 mg Oral NIGHTLY was sent over today but it has a major drug interaction because the patient takes Tramadol it flagged major depression/serotonin syndrome if taken together.    Please advise

## 2024-06-04 NOTE — PROGRESS NOTES
(1 - 1-dose 60+ series) Never done    COVID-19 Vaccine (4 - 2023-24 season) 09/01/2023    Annual Wellness Visit (Medicare Advantage)  01/01/2024    Depression Screen  01/30/2025    Flu vaccine  Completed    Shingles vaccine  Completed    Pneumococcal 65+ years Vaccine  Completed    Hib vaccine  Aged Out    Polio vaccine  Aged Out    Meningococcal (ACWY) vaccine  Aged Out    Hepatitis C screen  Discontinued

## 2024-06-05 ENCOUNTER — TELEPHONE (OUTPATIENT)
Dept: INTERNAL MEDICINE CLINIC | Age: 84
End: 2024-06-05

## 2024-06-05 NOTE — TELEPHONE ENCOUNTER
Patient states she was seen here yesterday and there were scripts that were supposed to be sent in for her.    She does not know what they are but they have not been called in.    Please advise

## 2024-06-06 ASSESSMENT — ENCOUNTER SYMPTOMS
BLOOD IN STOOL: 0
BACK PAIN: 0
ABDOMINAL DISTENTION: 0
EYE PAIN: 0
EYE DISCHARGE: 0
ABDOMINAL PAIN: 0
CONSTIPATION: 0
SHORTNESS OF BREATH: 0
EYE ITCHING: 0
COUGH: 0
COLOR CHANGE: 0
APNEA: 0
CHOKING: 0
DIARRHEA: 0
EYE REDNESS: 0
CHEST TIGHTNESS: 0

## 2024-06-08 ENCOUNTER — HOSPITAL ENCOUNTER (OUTPATIENT)
Age: 84
Discharge: HOME | End: 2024-06-08
Payer: MEDICARE

## 2024-06-08 DIAGNOSIS — I48.21 PERMANENT ATRIAL FIBRILLATION (HCC): ICD-10-CM

## 2024-06-08 DIAGNOSIS — I10 ESSENTIAL HYPERTENSION: ICD-10-CM

## 2024-06-08 LAB
ALBUMIN SERPL-MCNC: 4.1 G/DL (ref 3.5–5.2)
ALP SERPL-CCNC: 82 U/L (ref 35–104)
ALT SERPL-CCNC: 16 U/L (ref 5–33)
ANION GAP SERPL CALCULATED.3IONS-SCNC: 10 MMOL/L (ref 9–17)
AST SERPL-CCNC: 19 U/L
BILIRUB SERPL-MCNC: 0.8 MG/DL (ref 0.3–1.2)
BUN SERPL-MCNC: 12 MG/DL (ref 8–23)
CALCIUM SERPL-MCNC: 10.1 MG/DL (ref 8.6–10.4)
CHLORIDE SERPL-SCNC: 101 MMOL/L (ref 98–107)
CO2 SERPL-SCNC: 28 MMOL/L (ref 20–31)
CREAT SERPL-MCNC: 0.5 MG/DL (ref 0.5–0.9)
ERYTHROCYTE [DISTWIDTH] IN BLOOD BY AUTOMATED COUNT: 14.6 % (ref 11.5–14.9)
GFR, ESTIMATED: >90 ML/MIN/1.73M2
GLUCOSE SERPL-MCNC: 98 MG/DL (ref 70–99)
HCT VFR BLD AUTO: 49.7 % (ref 36–46)
HGB BLD-MCNC: 16.7 G/DL (ref 12–16)
MCH RBC QN AUTO: 34.5 PG (ref 26–34)
MCHC RBC AUTO-ENTMCNC: 33.7 G/DL (ref 31–37)
MCV RBC AUTO: 102.6 FL (ref 80–100)
PLATELET # BLD AUTO: 174 K/UL (ref 150–450)
PMV BLD AUTO: 8.1 FL (ref 6–12)
POTASSIUM SERPL-SCNC: 4.3 MMOL/L (ref 3.7–5.3)
PROT SERPL-MCNC: 7.6 G/DL (ref 6.4–8.3)
RBC # BLD AUTO: 4.85 M/UL (ref 4–5.2)
SODIUM SERPL-SCNC: 139 MMOL/L (ref 135–144)
WBC OTHER # BLD: 6.6 K/UL (ref 3.5–11)

## 2024-06-08 PROCEDURE — 85027 COMPLETE CBC AUTOMATED: CPT

## 2024-06-08 PROCEDURE — 36415 COLL VENOUS BLD VENIPUNCTURE: CPT

## 2024-06-08 PROCEDURE — 80053 COMPREHEN METABOLIC PANEL: CPT

## 2024-06-13 DIAGNOSIS — F41.9 ANXIETY: ICD-10-CM

## 2024-06-13 DIAGNOSIS — I10 ESSENTIAL HYPERTENSION: ICD-10-CM

## 2024-06-13 RX ORDER — LISINOPRIL 10 MG/1
TABLET ORAL
Qty: 360 TABLET | Refills: 0 | Status: SHIPPED | OUTPATIENT
Start: 2024-06-13

## 2024-06-13 RX ORDER — LORAZEPAM 1 MG/1
1 TABLET ORAL DAILY PRN
Qty: 30 TABLET | Refills: 0 | Status: SHIPPED | OUTPATIENT
Start: 2024-06-13 | End: 2024-07-13

## 2024-06-13 NOTE — TELEPHONE ENCOUNTER
Medication Requested: Ativan    Last visit: 2024  Next visit: 2024  Last refill: 2024    Med contract on file:  [x] yes   [] No   NEEDS UPDATED    Last urine drug screen: unknown  Consistent with medication(s):    [] yes   [] no    Last OARRS ran: unknown    Quantity of medication remainin    Who will be picking rx up:     Pharmacy if escribed:

## 2024-06-20 ENCOUNTER — HOSPITAL ENCOUNTER (EMERGENCY)
Age: 84
Discharge: HOME | End: 2024-06-20
Attending: EMERGENCY MEDICINE
Payer: MEDICARE

## 2024-06-20 VITALS
BODY MASS INDEX: 20.09 KG/M2 | TEMPERATURE: 98.5 F | OXYGEN SATURATION: 95 % | HEART RATE: 69 BPM | WEIGHT: 125 LBS | SYSTOLIC BLOOD PRESSURE: 134 MMHG | DIASTOLIC BLOOD PRESSURE: 87 MMHG | RESPIRATION RATE: 24 BRPM | HEIGHT: 66 IN

## 2024-06-20 DIAGNOSIS — N30.00 ACUTE CYSTITIS WITHOUT HEMATURIA: Primary | ICD-10-CM

## 2024-06-20 DIAGNOSIS — E86.0 DEHYDRATION: ICD-10-CM

## 2024-06-20 LAB
ANION GAP SERPL CALCULATED.3IONS-SCNC: 10 MMOL/L (ref 9–17)
BACTERIA URNS QL MICRO: ABNORMAL
BASOPHILS # BLD: 0 K/UL (ref 0–0.2)
BASOPHILS NFR BLD: 1 % (ref 0–2)
BILIRUB UR QL STRIP: NEGATIVE
BUN SERPL-MCNC: 29 MG/DL (ref 8–23)
CALCIUM SERPL-MCNC: 9.8 MG/DL (ref 8.6–10.4)
CASTS #/AREA URNS LPF: ABNORMAL /LPF
CHLORIDE SERPL-SCNC: 108 MMOL/L (ref 98–107)
CLARITY UR: CLEAR
CO2 SERPL-SCNC: 27 MMOL/L (ref 20–31)
COLOR UR: YELLOW
CREAT SERPL-MCNC: 0.6 MG/DL (ref 0.5–0.9)
DATE, STOOL #1: NORMAL
EOSINOPHIL # BLD: 0.1 K/UL (ref 0–0.4)
EOSINOPHILS RELATIVE PERCENT: 1 % (ref 0–4)
EPI CELLS #/AREA URNS HPF: ABNORMAL /HPF
ERYTHROCYTE [DISTWIDTH] IN BLOOD BY AUTOMATED COUNT: 14.1 % (ref 11.5–14.9)
GFR, ESTIMATED: >90 ML/MIN/1.73M2
GLUCOSE SERPL-MCNC: 103 MG/DL (ref 70–99)
GLUCOSE UR STRIP-MCNC: NEGATIVE MG/DL
HCT VFR BLD AUTO: 47.8 % (ref 36–46)
HEMOCCULT SP1 STL QL: NEGATIVE
HGB BLD-MCNC: 15.8 G/DL (ref 12–16)
HGB UR QL STRIP.AUTO: NEGATIVE
KETONES UR STRIP-MCNC: NEGATIVE MG/DL
LEUKOCYTE ESTERASE UR QL STRIP: ABNORMAL
LYMPHOCYTES NFR BLD: 2.5 K/UL (ref 1–4.8)
LYMPHOCYTES RELATIVE PERCENT: 43 % (ref 24–44)
MCH RBC QN AUTO: 34.1 PG (ref 26–34)
MCHC RBC AUTO-ENTMCNC: 33.1 G/DL (ref 31–37)
MCV RBC AUTO: 103.1 FL (ref 80–100)
MONOCYTES NFR BLD: 0.7 K/UL (ref 0.1–1.3)
MONOCYTES NFR BLD: 13 % (ref 1–7)
NEUTROPHILS NFR BLD: 42 % (ref 36–66)
NEUTS SEG NFR BLD: 2.4 K/UL (ref 1.3–9.1)
NITRITE UR QL STRIP: NEGATIVE
PH UR STRIP: 7 [PH] (ref 5–8)
PLATELET # BLD AUTO: 201 K/UL (ref 150–450)
PMV BLD AUTO: 7.7 FL (ref 6–12)
POTASSIUM SERPL-SCNC: 4 MMOL/L (ref 3.7–5.3)
PROT UR STRIP-MCNC: NEGATIVE MG/DL
RBC # BLD AUTO: 4.64 M/UL (ref 4–5.2)
RBC #/AREA URNS HPF: ABNORMAL /HPF
SODIUM SERPL-SCNC: 145 MMOL/L (ref 135–144)
SP GR UR STRIP: 1.02 (ref 1–1.03)
TIME, STOOL #1: 1836
TROPONIN I SERPL HS-MCNC: 10 NG/L (ref 0–14)
TROPONIN I SERPL HS-MCNC: 10 NG/L (ref 0–14)
UROBILINOGEN UR STRIP-ACNC: NORMAL EU/DL (ref 0–1)
WBC #/AREA URNS HPF: ABNORMAL /HPF
WBC OTHER # BLD: 5.7 K/UL (ref 3.5–11)

## 2024-06-20 PROCEDURE — 99284 EMERGENCY DEPT VISIT MOD MDM: CPT

## 2024-06-20 PROCEDURE — 87077 CULTURE AEROBIC IDENTIFY: CPT

## 2024-06-20 PROCEDURE — 84484 ASSAY OF TROPONIN QUANT: CPT

## 2024-06-20 PROCEDURE — 96360 HYDRATION IV INFUSION INIT: CPT

## 2024-06-20 PROCEDURE — 96361 HYDRATE IV INFUSION ADD-ON: CPT

## 2024-06-20 PROCEDURE — 81001 URINALYSIS AUTO W/SCOPE: CPT

## 2024-06-20 PROCEDURE — 36415 COLL VENOUS BLD VENIPUNCTURE: CPT

## 2024-06-20 PROCEDURE — 82272 OCCULT BLD FECES 1-3 TESTS: CPT

## 2024-06-20 PROCEDURE — 6370000000 HC RX 637 (ALT 250 FOR IP): Performed by: EMERGENCY MEDICINE

## 2024-06-20 PROCEDURE — 93005 ELECTROCARDIOGRAM TRACING: CPT | Performed by: EMERGENCY MEDICINE

## 2024-06-20 PROCEDURE — 51798 US URINE CAPACITY MEASURE: CPT

## 2024-06-20 PROCEDURE — 87086 URINE CULTURE/COLONY COUNT: CPT

## 2024-06-20 PROCEDURE — 2580000003 HC RX 258: Performed by: EMERGENCY MEDICINE

## 2024-06-20 PROCEDURE — 80048 BASIC METABOLIC PNL TOTAL CA: CPT

## 2024-06-20 PROCEDURE — 87186 SC STD MICRODIL/AGAR DIL: CPT

## 2024-06-20 PROCEDURE — 85025 COMPLETE CBC W/AUTO DIFF WBC: CPT

## 2024-06-20 RX ORDER — CEPHALEXIN 250 MG/1
500 CAPSULE ORAL ONCE
Status: COMPLETED | OUTPATIENT
Start: 2024-06-20 | End: 2024-06-20

## 2024-06-20 RX ORDER — CEPHALEXIN 500 MG/1
500 CAPSULE ORAL 2 TIMES DAILY
Qty: 14 CAPSULE | Refills: 0 | Status: SHIPPED | OUTPATIENT
Start: 2024-06-20 | End: 2024-06-27

## 2024-06-20 RX ORDER — 0.9 % SODIUM CHLORIDE 0.9 %
1000 INTRAVENOUS SOLUTION INTRAVENOUS ONCE
Status: COMPLETED | OUTPATIENT
Start: 2024-06-20 | End: 2024-06-20

## 2024-06-20 RX ADMIN — SODIUM CHLORIDE 1000 ML: 9 INJECTION, SOLUTION INTRAVENOUS at 18:46

## 2024-06-20 RX ADMIN — CEPHALEXIN 500 MG: 250 CAPSULE ORAL at 22:39

## 2024-06-20 ASSESSMENT — LIFESTYLE VARIABLES
HOW OFTEN DO YOU HAVE A DRINK CONTAINING ALCOHOL: NEVER
HOW MANY STANDARD DRINKS CONTAINING ALCOHOL DO YOU HAVE ON A TYPICAL DAY: PATIENT DOES NOT DRINK

## 2024-06-20 ASSESSMENT — ENCOUNTER SYMPTOMS
SHORTNESS OF BREATH: 0
NAUSEA: 0
ABDOMINAL PAIN: 1
VOMITING: 0

## 2024-06-20 ASSESSMENT — PAIN SCALES - GENERAL: PAINLEVEL_OUTOF10: 0

## 2024-06-20 ASSESSMENT — PAIN - FUNCTIONAL ASSESSMENT: PAIN_FUNCTIONAL_ASSESSMENT: 0-10

## 2024-06-20 NOTE — ED NOTES
Report given to LAMIN Mitchell from ED.   Report method in person   The following was reviewed with receiving RN:   Current vital signs:  /84   Pulse 88   Temp 98.5 °F (36.9 °C)   Resp 26   Ht 1.676 m (5' 6\")   Wt 56.7 kg (125 lb)   SpO2 97%   BMI 20.18 kg/m²                      Any medication or safety alerts were reviewed. Any pending diagnostics and notifications were also reviewed, as well as any safety concerns or issues, abnormal labs, abnormal imaging, and abnormal assessment findings. Questions were answered.

## 2024-06-20 NOTE — ED NOTES
Mode of arrival (squad #, walk in, police, etc) : Walk-in        Chief complaint(s): Fatigue, urinary retention        Arrival Note (brief scenario, treatment PTA, etc).: Patient arrived to ED with c/o overall fatigue/weakness along with urinary retention. Patient states she drinks a lot of water but has only been able to get \"dribbles\" out. Patient states that has also been losing a lot of weight.         C= \"Have you ever felt that you should Cut down on your drinking?\"  No  A= \"Have people Annoyed you by criticizing your drinking?\"  No  G= \"Have you ever felt bad or Guilty about your drinking?\"  No  E= \"Have you ever had a drink as an Eye-opener first thing in the morning to steady your nerves or to help a hangover?\"  No      Deferred []      Reason for deferring: N/A    *If yes to two or more: probable alcohol abuse.*

## 2024-06-20 NOTE — ED PROVIDER NOTES
[facility] ED  eMERGENCY dEPARTMENT eNCOUnter   Attending Attestation     Pt Name: Trisha Freed  MRN: [id number]  Birthdate 1940  Date of evaluation: 6/20/24       Trisha Freed is a 83 y.o. female who presents with Fatigue and Urinary Retention      History:   Patient is here because she states that she is been feeling fatigued and has been having a hard time urinating.  Patient states about 3 weeks ago she was evaluated and put on antibiotics.  Patient denies any fever no chest pain no abdominal pain no vomiting or diarrhea.  Patient states she is drinking quite a bit of fluids but not urinating much.    Exam: Vitals:   Vitals:    06/20/24 1741 06/20/24 1743   BP:  102/64   Pulse: (!) 113    Resp: 18    Temp: 98.5 °F (36.9 °C)    SpO2: 94%    Weight: 56.7 kg (125 lb)    Height: 1.676 m (5' 6\")      Heart tachycardic regular no murmurs.  Lungs clear to auscultation bilaterally.  Abdomen is soft nontender.    I performed a history and physical examination of the patient and discussed management with the resident. I reviewed the resident’s note and agree with the documented findings and plan of care. Any areas of disagreement are noted on the chart. I was personally present for the key portions of any procedures. I have documented in the chart those procedures where I was not present during the key portions. I have personally reviewed all images and agree with the resident's interpretation. I have reviewed the emergency nurses triage note. I agree with the chief complaint, past medical history, past surgical history, allergies, medications, social and family history as documented unless otherwise noted below. Documentation of the HPI, Physical Exam and Medical Decision Making performed by medical students or scribes is based on my personal performance of the HPI, PE and MDM. I personally evaluated and examined the patient in conjunction with the APC and agree with the assessment, treatment plan, and 
red blood per rectum, Cataracts, bilateral, DDD (degenerative disc disease), Gastric ulcer, Hepatitis, Hyperlipidemia, Hypertension, Inguinal hernia, MRSA (methicillin resistant staph aureus) culture positive, Osteoarthritis, and Retinopathy.       has a past surgical history that includes Partial hysterectomy (1986); bladder suspension (2000); Cataract removal (Left, 2009); Eye surgery; Upper gastrointestinal endoscopy (2014); Inguinal hernia repair (2001); Colonoscopy (09/10/2014); Upper gastrointestinal endoscopy (09/10/2014); eye surgery (Left); Upper gastrointestinal endoscopy (N/A, 06/10/2019); Colonoscopy (N/A, 06/10/2019); Upper gastrointestinal endoscopy (N/A, 2023); Colonoscopy (N/A, 2023); and Vein Surgery.      Social History     Socioeconomic History    Marital status:      Spouse name: Not on file    Number of children: Not on file    Years of education: Not on file    Highest education level: Not on file   Occupational History    Not on file   Tobacco Use    Smoking status: Former     Current packs/day: 0.00     Average packs/day: 0.5 packs/day for 50.0 years (25.0 ttl pk-yrs)     Types: Cigarettes     Start date:      Quit date:      Years since quittin.4    Smokeless tobacco: Never   Substance and Sexual Activity    Alcohol use: No    Drug use: No    Sexual activity: Not Currently   Other Topics Concern    Not on file   Social History Narrative    Not on file     Social Determinants of Health     Financial Resource Strain: Patient Declined (2024)    Overall Financial Resource Strain (CARDIA)     Difficulty of Paying Living Expenses: Patient declined   Food Insecurity: Patient Declined (2024)    Hunger Vital Sign     Worried About Running Out of Food in the Last Year: Patient declined     Ran Out of Food in the Last Year: Patient declined   Transportation Needs: Unknown (2024)    PRAPARE - Transportation     Lack of

## 2024-06-21 LAB
EKG Q-T INTERVAL: 350 MS
EKG QRS DURATION: 74 MS
EKG QTC CALCULATION (BAZETT): 442 MS
EKG R AXIS: -29 DEGREES
EKG T AXIS: 24 DEGREES
EKG VENTRICULAR RATE: 96 BPM

## 2024-06-21 NOTE — DISCHARGE INSTRUCTIONS
You were seen today urinary retention.  You appear dehydrated.  You do have a urinary tract infection.  We started you on antibiotics here.  Antibiotics have also been sent to your pharmacy    If you notice any concerning symptoms please return to the ER immediately. These can include but are not limited to: fevers, chills, shortness of breath, vomiting, weakness of the extremities, changes in your mental status, numbness, pale extremities, or chest pain.     Wound care: none    Diet: You may resume your normal diet.  Please make sure you are staying hydrated.  I recommend adding Gatorade to your diet to help with electrolytes    Activity: resume activity as tolerated.     Medications: Continue taking your home medications as previously directed. For pain You may take tylenol 1,000mg by mouth every 6 hours as needed for pain. Do not exceed 4,000mg per day. If you have liver disease don't take tylenol. You may also take ibuprofen 600mg every 6-8 hours as needed for pain. Do not exceed 2,400 mg per day. If you experience stomach pain or you have a history of kidney disease stop taking ibuprofen. You may alternate application of ice and heat 20 minutes at a time as desired.      Please take antibiotics as prescribed.     Follow up: Please follow up with your primary care doctor within one week

## 2024-06-23 LAB
MICROORGANISM SPEC CULT: ABNORMAL
MICROORGANISM SPEC CULT: ABNORMAL
SPECIMEN DESCRIPTION: ABNORMAL

## 2024-07-02 DIAGNOSIS — I83.93 VARICOSE VEINS OF BOTH LOWER EXTREMITIES, UNSPECIFIED WHETHER COMPLICATED: ICD-10-CM

## 2024-07-12 DIAGNOSIS — F41.9 ANXIETY: ICD-10-CM

## 2024-07-12 DIAGNOSIS — I10 ESSENTIAL HYPERTENSION: ICD-10-CM

## 2024-07-12 RX ORDER — LATANOPROST 50 UG/ML
2 SOLUTION/ DROPS OPHTHALMIC DAILY
Qty: 7.5 ML | Refills: 0 | Status: SHIPPED | OUTPATIENT
Start: 2024-07-12 | End: 2024-09-25

## 2024-07-12 RX ORDER — LORAZEPAM 1 MG/1
1 TABLET ORAL DAILY PRN
Qty: 30 TABLET | Refills: 0 | Status: SHIPPED | OUTPATIENT
Start: 2024-07-12 | End: 2024-08-11

## 2024-07-12 NOTE — TELEPHONE ENCOUNTER
Medication Requested: Ativan    Last visit: 6/4/24  Next visit: 9/6/2024  Last refill: 6/13/2024    Med contract on file:  [x] yes   [] no    Last urine drug screen: 11/15/19  Consistent with medication(s):    [] yes   [] no    Last OARRS ran: unknown    Quantity of medication remaining: Patient states she has a few fills left    Who will be picking rx up: Delivery    Pharmacy [facility] escribed[facility]      Also patient would like to know if you can order her something for dizziness,  Please advise

## 2024-07-25 DIAGNOSIS — I10 ESSENTIAL HYPERTENSION: ICD-10-CM

## 2024-07-25 RX ORDER — OMEPRAZOLE 20 MG/1
20 CAPSULE, DELAYED RELEASE ORAL DAILY
Qty: 270 CAPSULE | Refills: 0 | Status: SHIPPED | OUTPATIENT
Start: 2024-07-25

## 2024-07-25 NOTE — TELEPHONE ENCOUNTER
Patient called stating she would like to know if you can re-order this medication for her. She used to take it and then stopped taking it for awhile. Her stomach is bothering her on and off and would like to start taking this again.    Please advise

## 2024-08-07 DIAGNOSIS — F41.9 ANXIETY: ICD-10-CM

## 2024-08-07 DIAGNOSIS — I10 ESSENTIAL HYPERTENSION: ICD-10-CM

## 2024-08-08 RX ORDER — LORAZEPAM 1 MG/1
TABLET ORAL
Qty: 30 TABLET | Refills: 0 | Status: SHIPPED | OUTPATIENT
Start: 2024-08-08 | End: 2024-09-07

## 2024-08-30 DIAGNOSIS — I10 ESSENTIAL HYPERTENSION: ICD-10-CM

## 2024-08-30 DIAGNOSIS — F41.9 ANXIETY: ICD-10-CM

## 2024-09-03 RX ORDER — LORAZEPAM 1 MG/1
TABLET ORAL
Qty: 30 TABLET | Refills: 0 | Status: SHIPPED | OUTPATIENT
Start: 2024-09-03 | End: 2024-10-03

## 2024-09-03 NOTE — TELEPHONE ENCOUNTER
Patient called looking for update on refill    Medication Requested: Ativan    Last visit: 2024  Next visit: 2024  Last refill: 2024    Med contract on file:  [x] yes   [] no    Last urine drug screen: unknown   Consistent with medication(s):    [] yes   [] no    Last OARRS ran: unknown     Quantity of medication remainin    Who will be picking rx up: Patient     Pharmacy if escribed:

## 2024-09-24 ENCOUNTER — TELEPHONE (OUTPATIENT)
Dept: INTERNAL MEDICINE CLINIC | Age: 84
End: 2024-09-24

## 2024-09-24 ENCOUNTER — OFFICE VISIT (OUTPATIENT)
Dept: INTERNAL MEDICINE CLINIC | Age: 84
End: 2024-09-24
Payer: MEDICARE

## 2024-09-24 VITALS
HEIGHT: 66 IN | DIASTOLIC BLOOD PRESSURE: 88 MMHG | HEART RATE: 85 BPM | SYSTOLIC BLOOD PRESSURE: 126 MMHG | BODY MASS INDEX: 19.44 KG/M2 | OXYGEN SATURATION: 98 % | WEIGHT: 121 LBS

## 2024-09-24 DIAGNOSIS — Z00.00 MEDICARE ANNUAL WELLNESS VISIT, SUBSEQUENT: Primary | ICD-10-CM

## 2024-09-24 DIAGNOSIS — R63.4 WEIGHT LOSS: ICD-10-CM

## 2024-09-24 PROCEDURE — 1123F ACP DISCUSS/DSCN MKR DOCD: CPT | Performed by: INTERNAL MEDICINE

## 2024-09-24 PROCEDURE — G0439 PPPS, SUBSEQ VISIT: HCPCS | Performed by: INTERNAL MEDICINE

## 2024-09-24 PROCEDURE — 3074F SYST BP LT 130 MM HG: CPT | Performed by: INTERNAL MEDICINE

## 2024-09-24 PROCEDURE — 3079F DIAST BP 80-89 MM HG: CPT | Performed by: INTERNAL MEDICINE

## 2024-09-24 ASSESSMENT — PATIENT HEALTH QUESTIONNAIRE - PHQ9
SUM OF ALL RESPONSES TO PHQ QUESTIONS 1-9: 0
SUM OF ALL RESPONSES TO PHQ9 QUESTIONS 1 & 2: 0
SUM OF ALL RESPONSES TO PHQ QUESTIONS 1-9: 0
2. FEELING DOWN, DEPRESSED OR HOPELESS: NOT AT ALL
1. LITTLE INTEREST OR PLEASURE IN DOING THINGS: NOT AT ALL

## 2024-09-24 NOTE — TELEPHONE ENCOUNTER
Patient was seen today and talked about the doctor sending in a prescription for her to stop shaking, but nothing was prescribed please advise.

## 2024-09-30 NOTE — TELEPHONE ENCOUNTER
Called and spoke with patient. She wants to discuss maybe an increase or change. She was previously on two daily when she was with aisha and expressed that worked.

## 2024-10-01 DIAGNOSIS — F41.9 ANXIETY: ICD-10-CM

## 2024-10-01 DIAGNOSIS — I10 ESSENTIAL HYPERTENSION: ICD-10-CM

## 2024-10-01 RX ORDER — LORAZEPAM 1 MG/1
TABLET ORAL
Qty: 30 TABLET | Refills: 0 | Status: SHIPPED | OUTPATIENT
Start: 2024-10-01 | End: 2024-10-31

## 2024-10-02 ENCOUNTER — HOSPITAL ENCOUNTER (OUTPATIENT)
Age: 84
Discharge: HOME | End: 2024-10-02
Attending: INTERNAL MEDICINE
Payer: MEDICARE

## 2024-10-02 ENCOUNTER — HOSPITAL ENCOUNTER (OUTPATIENT)
Dept: CT IMAGING | Age: 84
Discharge: HOME | End: 2024-10-04
Attending: INTERNAL MEDICINE
Payer: MEDICARE

## 2024-10-02 DIAGNOSIS — R63.4 WEIGHT LOSS: ICD-10-CM

## 2024-10-02 LAB
ALBUMIN SERPL-MCNC: 3.9 G/DL (ref 3.5–5.2)
ALP SERPL-CCNC: 73 U/L (ref 35–104)
ALT SERPL-CCNC: 15 U/L (ref 5–33)
ANION GAP SERPL CALCULATED.3IONS-SCNC: 8 MMOL/L (ref 9–17)
AST SERPL-CCNC: 17 U/L
BILIRUB SERPL-MCNC: 0.7 MG/DL (ref 0.3–1.2)
BUN SERPL-MCNC: 20 MG/DL (ref 8–23)
CALCIUM SERPL-MCNC: 9.7 MG/DL (ref 8.6–10.4)
CHLORIDE SERPL-SCNC: 102 MMOL/L (ref 98–107)
CO2 SERPL-SCNC: 32 MMOL/L (ref 20–31)
CREAT SERPL-MCNC: 0.5 MG/DL (ref 0.5–0.9)
ERYTHROCYTE [DISTWIDTH] IN BLOOD BY AUTOMATED COUNT: 13.3 % (ref 11.5–14.9)
GFR, ESTIMATED: >90 ML/MIN/1.73M2
GLUCOSE SERPL-MCNC: 93 MG/DL (ref 70–99)
HCT VFR BLD AUTO: 46.7 % (ref 36–46)
HGB BLD-MCNC: 15.9 G/DL (ref 12–16)
MCH RBC QN AUTO: 35.2 PG (ref 26–34)
MCHC RBC AUTO-ENTMCNC: 34 G/DL (ref 31–37)
MCV RBC AUTO: 103.4 FL (ref 80–100)
PLATELET # BLD AUTO: 215 K/UL (ref 150–450)
PMV BLD AUTO: 7.8 FL (ref 6–12)
POTASSIUM SERPL-SCNC: 4.3 MMOL/L (ref 3.7–5.3)
PROT SERPL-MCNC: 7.6 G/DL (ref 6.4–8.3)
RBC # BLD AUTO: 4.52 M/UL (ref 4–5.2)
SODIUM SERPL-SCNC: 142 MMOL/L (ref 135–144)
TSH SERPL DL<=0.05 MIU/L-ACNC: 2.44 UIU/ML (ref 0.3–5)
WBC OTHER # BLD: 4.9 K/UL (ref 3.5–11)

## 2024-10-02 PROCEDURE — 2500000003 HC RX 250 WO HCPCS: Performed by: INTERNAL MEDICINE

## 2024-10-02 PROCEDURE — 74177 CT ABD & PELVIS W/CONTRAST: CPT

## 2024-10-02 PROCEDURE — 85027 COMPLETE CBC AUTOMATED: CPT

## 2024-10-02 PROCEDURE — 84443 ASSAY THYROID STIM HORMONE: CPT

## 2024-10-02 PROCEDURE — 36415 COLL VENOUS BLD VENIPUNCTURE: CPT

## 2024-10-02 PROCEDURE — 6360000004 HC RX CONTRAST MEDICATION: Performed by: INTERNAL MEDICINE

## 2024-10-02 PROCEDURE — 80053 COMPREHEN METABOLIC PANEL: CPT

## 2024-10-02 PROCEDURE — 2580000003 HC RX 258: Performed by: INTERNAL MEDICINE

## 2024-10-02 RX ORDER — SODIUM CHLORIDE 0.9 % (FLUSH) 0.9 %
10 SYRINGE (ML) INJECTION PRN
Status: DISCONTINUED | OUTPATIENT
Start: 2024-10-02 | End: 2024-10-05 | Stop reason: HOSPADM

## 2024-10-02 RX ORDER — 0.9 % SODIUM CHLORIDE 0.9 %
100 INTRAVENOUS SOLUTION INTRAVENOUS ONCE
Status: COMPLETED | OUTPATIENT
Start: 2024-10-02 | End: 2024-10-02

## 2024-10-02 RX ORDER — IOPAMIDOL 755 MG/ML
100 INJECTION, SOLUTION INTRAVASCULAR
Status: COMPLETED | OUTPATIENT
Start: 2024-10-02 | End: 2024-10-02

## 2024-10-02 RX ADMIN — IOPAMIDOL 100 ML: 755 INJECTION, SOLUTION INTRAVENOUS at 14:52

## 2024-10-02 RX ADMIN — SODIUM CHLORIDE, PRESERVATIVE FREE 10 ML: 5 INJECTION INTRAVENOUS at 14:52

## 2024-10-02 RX ADMIN — SODIUM CHLORIDE 100 ML: 9 INJECTION, SOLUTION INTRAVENOUS at 14:52

## 2024-10-02 RX ADMIN — BARIUM SULFATE 450 ML: 20 SUSPENSION ORAL at 14:52

## 2024-10-03 ENCOUNTER — TELEPHONE (OUTPATIENT)
Dept: INTERNAL MEDICINE CLINIC | Age: 84
End: 2024-10-03

## 2024-10-03 NOTE — TELEPHONE ENCOUNTER
Patient has Abnormal CT Chest , needs to discuss Further Course of action   Please schedule appointment , can double book on MOnday, If no appointment   Tx

## 2024-10-07 ENCOUNTER — OFFICE VISIT (OUTPATIENT)
Dept: INTERNAL MEDICINE CLINIC | Age: 84
End: 2024-10-07
Payer: MEDICARE

## 2024-10-07 VITALS
SYSTOLIC BLOOD PRESSURE: 120 MMHG | DIASTOLIC BLOOD PRESSURE: 74 MMHG | OXYGEN SATURATION: 93 % | HEART RATE: 82 BPM | BODY MASS INDEX: 19.21 KG/M2 | WEIGHT: 119 LBS

## 2024-10-07 DIAGNOSIS — H04.123 DRY EYES, BILATERAL: ICD-10-CM

## 2024-10-07 DIAGNOSIS — I10 ESSENTIAL HYPERTENSION: ICD-10-CM

## 2024-10-07 DIAGNOSIS — R13.10 DYSPHAGIA, UNSPECIFIED TYPE: ICD-10-CM

## 2024-10-07 DIAGNOSIS — J98.4 CAVITARY LUNG DISEASE: Primary | ICD-10-CM

## 2024-10-07 PROCEDURE — 3074F SYST BP LT 130 MM HG: CPT | Performed by: INTERNAL MEDICINE

## 2024-10-07 PROCEDURE — 99214 OFFICE O/P EST MOD 30 MIN: CPT | Performed by: INTERNAL MEDICINE

## 2024-10-07 PROCEDURE — 1123F ACP DISCUSS/DSCN MKR DOCD: CPT | Performed by: INTERNAL MEDICINE

## 2024-10-07 PROCEDURE — 3078F DIAST BP <80 MM HG: CPT | Performed by: INTERNAL MEDICINE

## 2024-10-07 RX ORDER — LEVOFLOXACIN 750 MG/1
750 TABLET, FILM COATED ORAL DAILY
Qty: 5 TABLET | Refills: 0 | Status: SHIPPED | OUTPATIENT
Start: 2024-10-07 | End: 2024-10-12

## 2024-10-07 RX ORDER — POLYVINYL ALCOHOL 14 MG/ML
2 SOLUTION/ DROPS OPHTHALMIC PRN
Qty: 1 EACH | Refills: 2 | Status: SHIPPED | OUTPATIENT
Start: 2024-10-07

## 2024-10-07 NOTE — PROGRESS NOTES
[facility]  [address] [address]  Dept: [phone]  Dept Fax: [phone]    Office Progress/Follow Up Note  Date of patient's visit: 10/7/2024  Patient's Name:  Trisha Freed YOB: 1940            Patient Care Team:  Kevin Karimi MD as PCP - General (Internal Medicine)  Kevin Karimi MD as PCP - Empaneled Provider  Jesus Rivera MD as Consulting Physician (Gastroenterology)    REASON FOR VISIT: Routine outpatient follow up/Same day visit/Post hospital/ED visit    HISTORY OF PRESENT ILLNESS:      Chief Complaint   Patient presents with    Results     CT chest        History was obtained from the patient. Trisha Freed is a 84 y.o. is here for a   Patient, is here with her daughter, she has concerned about weight loss  Patient went CT chest, abdomen, found to have cavitary lesion in left lung  Daughter mentioned that she has remote history of tuberculosis many years ago  Chronic smoker  Weight is stable  Here to discuss results of her CT chest  No other complaints        Health Maintenance Due   Topic Date Due    Hepatitis A vaccine (1 of 2 - Risk 2-dose series) Never done    DTaP/Tdap/Td vaccine (1 - Tdap) Never done    DEXA (modify frequency per FRAX score)  Never done    Hepatitis B vaccine (1 of 3 - Risk 3-dose series) Never done    Respiratory Syncytial Virus (RSV) Pregnant or age 60 yrs+ (1 - 1-dose 60+ series) Never done    Flu vaccine (1) 08/01/2024    COVID-19 Vaccine (4 - 2023-24 season) 09/01/2024       Allergies   Allergen Reactions    Bee Pollen Anaphylaxis    Iron Rash         MEDICATIONS:     Current Outpatient Medications   Medication Sig Dispense Refill    LORazepam (ATIVAN) 1 MG tablet TAKE ONE (1) TABLET BY MOUTH DAILY AS NEEDED FOR ANXIETY FOR UP TO 30 DAYS. MAX DAILY AMOUNT: ONE (1) MG 30 tablet 0    omeprazole (PRILOSEC) 20 MG delayed release capsule Take 1 capsule by mouth daily 270 capsule 0    diclofenac sodium

## 2024-10-10 ENCOUNTER — TELEPHONE (OUTPATIENT)
Dept: INTERNAL MEDICINE CLINIC | Age: 84
End: 2024-10-10

## 2024-10-10 ASSESSMENT — ENCOUNTER SYMPTOMS
BACK PAIN: 0
SHORTNESS OF BREATH: 0
ABDOMINAL DISTENTION: 0
DIARRHEA: 0
EYE DISCHARGE: 0
APNEA: 0
EYE PAIN: 0
COLOR CHANGE: 0
BLOOD IN STOOL: 0
ABDOMINAL PAIN: 0
CHOKING: 0
CHEST TIGHTNESS: 0
COUGH: 0
EYE ITCHING: 0
CONSTIPATION: 0
EYE REDNESS: 0

## 2024-10-10 NOTE — TELEPHONE ENCOUNTER
Dr. Khanna's office, tried to call her multiple times to schedule appointment, unfortunately she was not picking up the phone  Please try to call her and tell her, that she need to see Dr. Khanna sooner

## 2024-10-28 ENCOUNTER — HOSPITAL ENCOUNTER (OUTPATIENT)
Dept: CT IMAGING | Age: 84
Discharge: HOME | End: 2024-10-30
Attending: INTERNAL MEDICINE
Payer: MEDICARE

## 2024-10-28 PROCEDURE — 2580000003 HC RX 258: Performed by: INTERNAL MEDICINE

## 2024-10-28 PROCEDURE — 6360000004 HC RX CONTRAST MEDICATION: Performed by: INTERNAL MEDICINE

## 2024-10-28 PROCEDURE — 71260 CT THORAX DX C+: CPT

## 2024-10-28 RX ORDER — 0.9 % SODIUM CHLORIDE 0.9 %
100 INTRAVENOUS SOLUTION INTRAVENOUS ONCE
Status: COMPLETED | OUTPATIENT
Start: 2024-10-28 | End: 2024-10-28

## 2024-10-28 RX ORDER — SODIUM CHLORIDE 0.9 % (FLUSH) 0.9 %
10 SYRINGE (ML) INJECTION PRN
Status: DISCONTINUED | OUTPATIENT
Start: 2024-10-28 | End: 2024-10-31 | Stop reason: HOSPADM

## 2024-10-28 RX ORDER — IOPAMIDOL 755 MG/ML
75 INJECTION, SOLUTION INTRAVASCULAR
Status: COMPLETED | OUTPATIENT
Start: 2024-10-28 | End: 2024-10-28

## 2024-10-28 RX ADMIN — SODIUM CHLORIDE 100 ML: 9 INJECTION, SOLUTION INTRAVENOUS at 14:04

## 2024-10-28 RX ADMIN — SODIUM CHLORIDE, PRESERVATIVE FREE 10 ML: 5 INJECTION INTRAVENOUS at 14:03

## 2024-10-28 RX ADMIN — IOPAMIDOL 75 ML: 755 INJECTION, SOLUTION INTRAVENOUS at 14:03

## 2024-10-29 ENCOUNTER — TELEPHONE (OUTPATIENT)
Dept: INTERNAL MEDICINE CLINIC | Age: 84
End: 2024-10-29

## 2024-10-29 NOTE — TELEPHONE ENCOUNTER
CT chest, concerning for left upper lobe nodule, will need further workup including biopsy, PET scan  Patient need to follow-up with Dr. Jey Khanna's office tried to reach her multiple times without any success, please inform patient, since it is very important to follow-up with pulmonologist and further workup of this lung nodule

## 2024-10-30 DIAGNOSIS — I10 ESSENTIAL HYPERTENSION: ICD-10-CM

## 2024-10-30 DIAGNOSIS — F41.9 ANXIETY: ICD-10-CM

## 2024-10-30 RX ORDER — LORAZEPAM 1 MG/1
TABLET ORAL
Qty: 30 TABLET | Refills: 0 | OUTPATIENT
Start: 2024-10-30 | End: 2024-11-29

## 2024-10-30 RX ORDER — LORAZEPAM 1 MG/1
1 TABLET ORAL DAILY PRN
Qty: 30 TABLET | Refills: 0 | Status: SHIPPED | OUTPATIENT
Start: 2024-10-30 | End: 2024-11-29

## 2024-10-30 NOTE — TELEPHONE ENCOUNTER
Medication Requested: Ativan    Last visit: 10/7/2024  Next visit: 2024  Last refill: 10/1/2024    Med contract on file:  [] yes   [x] no    Last urine drug screen: N/A  Consistent with medication(s):    [x] yes   [] no    Last OARRS ran: Unknown    Quantity of medication remainin    Who will be picking rx up: Delivery    Pharmacy if escribed: [facility] [place]

## 2024-11-29 ENCOUNTER — HOSPITAL ENCOUNTER (OUTPATIENT)
Age: 84
Setting detail: SPECIMEN
Discharge: HOME | End: 2024-11-29

## 2024-11-29 ENCOUNTER — OFFICE VISIT (OUTPATIENT)
Dept: INTERNAL MEDICINE CLINIC | Age: 84
End: 2024-11-29
Payer: MEDICARE

## 2024-11-29 VITALS
HEIGHT: 66 IN | WEIGHT: 121 LBS | SYSTOLIC BLOOD PRESSURE: 124 MMHG | TEMPERATURE: 97.2 F | OXYGEN SATURATION: 96 % | BODY MASS INDEX: 19.44 KG/M2 | HEART RATE: 82 BPM | DIASTOLIC BLOOD PRESSURE: 78 MMHG

## 2024-11-29 DIAGNOSIS — F41.9 ANXIETY: ICD-10-CM

## 2024-11-29 DIAGNOSIS — I10 ESSENTIAL HYPERTENSION: ICD-10-CM

## 2024-11-29 DIAGNOSIS — R91.8 LUNG MASS: Primary | ICD-10-CM

## 2024-11-29 DIAGNOSIS — R42 DIZZINESS: ICD-10-CM

## 2024-11-29 LAB
BILIRUB UR QL STRIP: NEGATIVE
CLARITY UR: CLEAR
COLOR UR: YELLOW
COMMENT: NORMAL
GLUCOSE UR STRIP-MCNC: NEGATIVE MG/DL
HGB UR QL STRIP.AUTO: NEGATIVE
KETONES UR STRIP-MCNC: NEGATIVE MG/DL
LEUKOCYTE ESTERASE UR QL STRIP: NEGATIVE
NITRITE UR QL STRIP: NEGATIVE
PH UR STRIP: 6 [PH] (ref 5–8)
PROT UR STRIP-MCNC: NEGATIVE MG/DL
SP GR UR STRIP: 1.01 (ref 1–1.03)
UROBILINOGEN UR STRIP-ACNC: NORMAL EU/DL (ref 0–1)

## 2024-11-29 PROCEDURE — 1159F MED LIST DOCD IN RCRD: CPT | Performed by: INTERNAL MEDICINE

## 2024-11-29 PROCEDURE — 3078F DIAST BP <80 MM HG: CPT | Performed by: INTERNAL MEDICINE

## 2024-11-29 PROCEDURE — 1123F ACP DISCUSS/DSCN MKR DOCD: CPT | Performed by: INTERNAL MEDICINE

## 2024-11-29 PROCEDURE — 99214 OFFICE O/P EST MOD 30 MIN: CPT | Performed by: INTERNAL MEDICINE

## 2024-11-29 PROCEDURE — 3074F SYST BP LT 130 MM HG: CPT | Performed by: INTERNAL MEDICINE

## 2024-11-29 RX ORDER — MIRTAZAPINE 7.5 MG/1
7.5 TABLET, FILM COATED ORAL NIGHTLY
Qty: 90 TABLET | Refills: 1 | Status: SHIPPED | OUTPATIENT
Start: 2024-11-29

## 2024-11-29 RX ORDER — LATANOPROST 50 UG/ML
2 SOLUTION/ DROPS OPHTHALMIC DAILY
Qty: 7.5 ML | Refills: 0 | Status: SHIPPED | OUTPATIENT
Start: 2024-11-29 | End: 2025-02-12

## 2024-11-29 RX ORDER — LORAZEPAM 1 MG/1
0.5 TABLET ORAL DAILY PRN
Qty: 15 TABLET | Refills: 0 | Status: SHIPPED | OUTPATIENT
Start: 2024-11-29 | End: 2024-12-29

## 2024-11-29 NOTE — PROGRESS NOTES
Number of Places Lived in the Last Year: Not on file     Unstable Housing in the Last Year: Patient declined           CURRENT MEDICATIONS:  Current Outpatient Medications   Medication Sig Dispense Refill    LORazepam (ATIVAN) 1 MG tablet Take 1 tablet by mouth daily as needed for Anxiety for up to 30 days. Max Daily Amount: 1 mg 30 tablet 0    polyvinyl alcohol (LIQUIFILM TEARS) 1.4 % ophthalmic solution Place 2 drops into both eyes as needed for Dry Eyes 1 each 2    omeprazole (PRILOSEC) 20 MG delayed release capsule Take 1 capsule by mouth daily 270 capsule 0    diclofenac sodium (VOLTAREN) 1 % GEL APPLY FOUR (4) GRAMS TOPICALLY FOUR (4) TIMES DAILY 100 g 1    lisinopril (PRINIVIL;ZESTRIL) 10 MG tablet TAKE 1 TABLET BY MOUTH DAILY 360 tablet 0    metoprolol tartrate (LOPRESSOR) 25 MG tablet Take 1 tablet by mouth 2 times daily 180 tablet 3    tobramycin-dexAMETHasone (TOBRADEX) 0.3-0.1 % ophthalmic suspension       acetaminophen (TYLENOL) 325 MG tablet Take 2 tablets by mouth every 6 hours as needed for Pain      latanoprost (XALATAN) 0.005 % ophthalmic solution Place 2 drops into both eyes daily 7.5 mL 0    furosemide (LASIX) 20 MG tablet TAKE 1 TABLET BY MOUTH EVERY MORNING (Patient not taking: Reported on 6/4/2024) 90 tablet 3     No current facility-administered medications for this visit.         OBJECTIVE:  Vitals:    11/29/24 1111   BP: 124/78   Pulse: 82   Temp: 97.2 °F (36.2 °C)   SpO2: 96%     Body mass index is 19.53 kg/m².        Focal exam:    General exam (except above):  General appearance - well appearing, alert, in no acute distress  Head - Atraumatic, normocephalic  Eyes - EOMI, no jaundice or pallor  Lungs - Lungs clear to auscultation.  No wheezing, rhonchi, rales  Heart - RRR without murmur, gallop, or rubs.  No ectopy  Abdomen - Abdomen soft, non-tender. Bowel sounds normal. No masses, organomegaly  Extremities -No significant edema, or skin discoloration. Good capillary refill.  Neuro - Pt 
Attending

## 2024-12-12 ENCOUNTER — HOSPITAL ENCOUNTER (OUTPATIENT)
Dept: CT IMAGING | Age: 84
Discharge: HOME | End: 2024-12-14
Attending: INTERNAL MEDICINE
Payer: MEDICARE

## 2024-12-12 DIAGNOSIS — R42 DIZZINESS: ICD-10-CM

## 2024-12-12 LAB
EGFR, POC: 86 ML/MIN/1.73M2
POC CREATININE: 0.7 MG/DL (ref 0.51–1.19)

## 2024-12-12 PROCEDURE — 70460 CT HEAD/BRAIN W/DYE: CPT

## 2024-12-12 PROCEDURE — 2580000003 HC RX 258: Performed by: INTERNAL MEDICINE

## 2024-12-12 PROCEDURE — 6360000004 HC RX CONTRAST MEDICATION: Performed by: INTERNAL MEDICINE

## 2024-12-12 PROCEDURE — 82565 ASSAY OF CREATININE: CPT

## 2024-12-12 RX ORDER — 0.9 % SODIUM CHLORIDE 0.9 %
80 INTRAVENOUS SOLUTION INTRAVENOUS ONCE
Status: COMPLETED | OUTPATIENT
Start: 2024-12-12 | End: 2024-12-12

## 2024-12-12 RX ORDER — SODIUM CHLORIDE 0.9 % (FLUSH) 0.9 %
10 SYRINGE (ML) INJECTION PRN
Status: DISCONTINUED | OUTPATIENT
Start: 2024-12-12 | End: 2024-12-15 | Stop reason: HOSPADM

## 2024-12-12 RX ORDER — IOPAMIDOL 755 MG/ML
75 INJECTION, SOLUTION INTRAVASCULAR
Status: COMPLETED | OUTPATIENT
Start: 2024-12-12 | End: 2024-12-12

## 2024-12-12 RX ADMIN — SODIUM CHLORIDE 80 ML: 9 INJECTION, SOLUTION INTRAVENOUS at 11:33

## 2024-12-12 RX ADMIN — IOPAMIDOL 75 ML: 755 INJECTION, SOLUTION INTRAVENOUS at 11:30

## 2024-12-12 RX ADMIN — SODIUM CHLORIDE, PRESERVATIVE FREE 10 ML: 5 INJECTION INTRAVENOUS at 11:33

## 2024-12-18 ENCOUNTER — HOSPITAL ENCOUNTER (EMERGENCY)
Age: 84
Discharge: HOME | End: 2024-12-18
Attending: EMERGENCY MEDICINE
Payer: MEDICARE

## 2024-12-18 ENCOUNTER — APPOINTMENT (OUTPATIENT)
Dept: GENERAL RADIOLOGY | Age: 84
End: 2024-12-18
Attending: EMERGENCY MEDICINE
Payer: MEDICARE

## 2024-12-18 VITALS
RESPIRATION RATE: 16 BRPM | OXYGEN SATURATION: 94 % | BODY MASS INDEX: 19.44 KG/M2 | SYSTOLIC BLOOD PRESSURE: 133 MMHG | HEIGHT: 66 IN | HEART RATE: 85 BPM | DIASTOLIC BLOOD PRESSURE: 94 MMHG | WEIGHT: 121 LBS | TEMPERATURE: 97.8 F

## 2024-12-18 DIAGNOSIS — R04.0 EPISTAXIS: Primary | ICD-10-CM

## 2024-12-18 LAB
ALBUMIN SERPL-MCNC: 3.6 G/DL (ref 3.5–5.2)
ALP SERPL-CCNC: 79 U/L (ref 35–104)
ALT SERPL-CCNC: 11 U/L (ref 10–35)
ANION GAP SERPL CALCULATED.3IONS-SCNC: 10 MMOL/L (ref 9–16)
AST SERPL-CCNC: 22 U/L (ref 10–35)
BASOPHILS # BLD: 0 K/UL (ref 0–0.2)
BASOPHILS NFR BLD: 1 % (ref 0–2)
BILIRUB SERPL-MCNC: 0.8 MG/DL (ref 0–1.2)
BUN SERPL-MCNC: 15 MG/DL (ref 8–23)
CALCIUM SERPL-MCNC: 9.7 MG/DL (ref 8.6–10.4)
CHLORIDE SERPL-SCNC: 105 MMOL/L (ref 98–107)
CO2 SERPL-SCNC: 25 MMOL/L (ref 20–31)
CREAT SERPL-MCNC: 0.7 MG/DL (ref 0.7–1.2)
EOSINOPHIL # BLD: 0.1 K/UL (ref 0–0.4)
EOSINOPHILS RELATIVE PERCENT: 1 % (ref 0–4)
ERYTHROCYTE [DISTWIDTH] IN BLOOD BY AUTOMATED COUNT: 13.4 % (ref 11.5–14.9)
GFR, ESTIMATED: 86 ML/MIN/1.73M2
GLUCOSE SERPL-MCNC: 87 MG/DL (ref 74–99)
HCT VFR BLD AUTO: 47.3 % (ref 36–46)
HGB BLD-MCNC: 15.8 G/DL (ref 12–16)
INR PPP: 1.1
LYMPHOCYTES NFR BLD: 1.7 K/UL (ref 1–4.8)
LYMPHOCYTES RELATIVE PERCENT: 34 % (ref 24–44)
MCH RBC QN AUTO: 34.5 PG (ref 26–34)
MCHC RBC AUTO-ENTMCNC: 33.3 G/DL (ref 31–37)
MCV RBC AUTO: 103.5 FL (ref 80–100)
MONOCYTES NFR BLD: 0.4 K/UL (ref 0.1–1.3)
MONOCYTES NFR BLD: 8 % (ref 1–7)
NEUTROPHILS NFR BLD: 56 % (ref 36–66)
NEUTS SEG NFR BLD: 2.8 K/UL (ref 1.3–9.1)
PLATELET # BLD AUTO: 175 K/UL (ref 150–450)
PMV BLD AUTO: 8 FL (ref 6–12)
POTASSIUM SERPL-SCNC: 4.2 MMOL/L (ref 3.7–5.3)
PROT SERPL-MCNC: 7.1 G/DL (ref 6.6–8.7)
PROTHROMBIN TIME: 14.3 SEC (ref 11.8–14.6)
RBC # BLD AUTO: 4.57 M/UL (ref 4–5.2)
SODIUM SERPL-SCNC: 140 MMOL/L (ref 136–145)
WBC OTHER # BLD: 5 K/UL (ref 3.5–11)

## 2024-12-18 PROCEDURE — 99284 EMERGENCY DEPT VISIT MOD MDM: CPT

## 2024-12-18 PROCEDURE — 36415 COLL VENOUS BLD VENIPUNCTURE: CPT

## 2024-12-18 PROCEDURE — 85025 COMPLETE CBC W/AUTO DIFF WBC: CPT

## 2024-12-18 PROCEDURE — 71046 X-RAY EXAM CHEST 2 VIEWS: CPT

## 2024-12-18 PROCEDURE — 85610 PROTHROMBIN TIME: CPT

## 2024-12-18 PROCEDURE — 80053 COMPREHEN METABOLIC PANEL: CPT

## 2024-12-18 NOTE — DISCHARGE INSTRUCTIONS
I think the blood that was in your phlegm was from a nosebleed on the right side.  There is dried blood in your right nostril.

## 2024-12-18 NOTE — ED TRIAGE NOTES
Mode of arrival (squad #, walk in, police, etc) : walk-in        Chief complaint(s): coughing up blood         Arrival Note (brief scenario, treatment PTA, etc).: Pt reports one episode of bloody sputum and cough         C= \"Have you ever felt that you should Cut down on your drinking?\"  No  A= \"Have people Annoyed you by criticizing your drinking?\"  No  G= \"Have you ever felt bad or Guilty about your drinking?\"  No  E= \"Have you ever had a drink as an Eye-opener first thing in the morning to steady your nerves or to help a hangover?\"  No      Deferred []      Reason for deferring: N/A    *If yes to two or more: probable alcohol abuse.*

## 2024-12-18 NOTE — ED PROVIDER NOTES
EMERGENCY DEPARTMENT ENCOUNTER    Pt Name: Trisha Freed  MRN: [id number]  Birthdate 1940  Date of evaluation: 12/18/24  CHIEF COMPLAINT       Chief Complaint   Patient presents with    Hemoptysis    Cough     HISTORY OF PRESENT ILLNESS   HPI  Patient states she spit up a little bit of phlegm with blood in it.  Denies a cough.  She has some lung masses that are being worked up outpatient with CTs done recently.  Denies any shortness of breath.  She repeatedly denies a cough I asked her several times.  She states she does not cough she is not coughing anything up.  She is here with family.  Denies any fevers chill sweats.  Denies any nosebleeds or sore throat.  She states she does produce some phlegm from the back of her throat.  She supposed to get a swallow screen next week.  Outpatient swallow study next week.      REVIEW OF SYSTEMS     Review of Systems   All other systems reviewed and are negative.    PASTMEDICAL HISTORY     Past Medical History:   Diagnosis Date    Anemia     Anxiety     Arthritis     Tejada's palsy     Bright red blood per rectum     Cataracts, bilateral     DDD (degenerative disc disease)     Gastric ulcer 2014    per egd    Hepatitis 1965    Hyperlipidemia     Hypertension     Inguinal hernia     MRSA (methicillin resistant staph aureus) culture positive 02/22/2017    urine    Osteoarthritis     Retinopathy      Past Problem List  Patient Active Problem List   Diagnosis Code    Cataracts, bilateral H26.9    Hepatitis K75.9    Diverticulosis K57.90    Bell's palsy G51.0    Duodenal ulcer K26.9    Gastric ulcer K25.9    Osteoarthritis M19.90    Hyperlipidemia E78.5    GI bleed K92.2    Lower GI bleed K92.2    Anxiety F41.9    Colitis K52.9    Chronic pain syndrome G89.4    Pneumonia due to organism J18.9    Permanent atrial fibrillation (HCC) I48.21    Recurrent UTI N39.0    Pulmonary HTN (HCC) I27.20    Rash of face R21    Herpes zoster without complication left  B02.9    Primary hypertension

## 2024-12-19 DIAGNOSIS — I10 ESSENTIAL HYPERTENSION: ICD-10-CM

## 2024-12-19 DIAGNOSIS — F41.9 ANXIETY: ICD-10-CM

## 2024-12-19 DIAGNOSIS — R42 DIZZINESS: ICD-10-CM

## 2024-12-19 RX ORDER — LORAZEPAM 1 MG/1
TABLET ORAL
Qty: 15 TABLET | Refills: 0 | OUTPATIENT
Start: 2024-12-19

## 2024-12-23 ENCOUNTER — HOSPITAL ENCOUNTER (OUTPATIENT)
Dept: GENERAL RADIOLOGY | Age: 84
Discharge: HOME | End: 2024-12-25
Attending: INTERNAL MEDICINE
Payer: MEDICARE

## 2024-12-23 DIAGNOSIS — R13.10 DYSPHAGIA, UNSPECIFIED TYPE: ICD-10-CM

## 2024-12-23 PROCEDURE — 74230 X-RAY XM SWLNG FUNCJ C+: CPT

## 2024-12-23 PROCEDURE — 92611 MOTION FLUOROSCOPY/SWALLOW: CPT

## 2024-12-23 NOTE — PROCEDURES
INSTRUMENTAL SWALLOW REPORT  MODIFIED BARIUM SWALLOW    NAME: Trisha Freed   : 1940  MRN: [id number]       Date of Eval: 2024              Referring Diagnosis(es):  dysphagia    Past Medical History:  has a past medical history of Anemia, Anxiety, Arthritis, Bell's palsy, Bright red blood per rectum, Cataracts, bilateral, DDD (degenerative disc disease), Gastric ulcer, Hepatitis, Hyperlipidemia, Hypertension, Inguinal hernia, MRSA (methicillin resistant staph aureus) culture positive, Osteoarthritis, and Retinopathy.  Past Surgical History:  has a past surgical history that includes Partial hysterectomy (1986); bladder suspension (2000); Cataract removal (Left, 2009); Eye surgery; Upper gastrointestinal endoscopy (2014); Inguinal hernia repair (2001); Colonoscopy (09/10/2014); Upper gastrointestinal endoscopy (09/10/2014); eye surgery (Left); Upper gastrointestinal endoscopy (N/A, 06/10/2019); Colonoscopy (N/A, 06/10/2019); Upper gastrointestinal endoscopy (N/A, 2023); Colonoscopy (N/A, 2023); and Vein Surgery.               Type of Study: Initial MBS       Recent CXR/CT of Chest: Date 24 CXR- 1. No acute cardiopulmonary process.  2. Mild to moderate cardiomegaly.    Patient Complaints/Reason for Referral:  Trisha Freed was referred for a MBS to assess the efficiency of his/her swallow function, assess for aspiration, and to make recommendations regarding safe dietary consistencies, effective compensatory strategies, and safe eating environment.       Onset of problem:      Pt. Reports increased sinus drainage and feeling of food/liquid \"coming back up on me.\"          Behavior/Cognition/Vision/Hearing:  Behavior/Cognition: Alert;Cooperative  Vision: Impaired  Vision Exceptions: Wears glasses at all times    Impressions:     Patient Position: Lateral       Consistencies Administered: Soft & Bite Sized;Pureed;Thin cup;Thin teaspoon;Mildly Thick cup

## 2024-12-30 DIAGNOSIS — R42 DIZZINESS: ICD-10-CM

## 2024-12-30 DIAGNOSIS — F41.9 ANXIETY: ICD-10-CM

## 2024-12-30 DIAGNOSIS — I10 ESSENTIAL HYPERTENSION: ICD-10-CM

## 2024-12-30 RX ORDER — LORAZEPAM 1 MG/1
0.5 TABLET ORAL DAILY PRN
Qty: 7 TABLET | Refills: 0 | Status: SHIPPED | OUTPATIENT
Start: 2024-12-30 | End: 2025-01-14

## 2024-12-30 NOTE — TELEPHONE ENCOUNTER
Medication Requested: Lorazepam    Last visit: 24  Next visit: 2024  Last refill: 24    Med contract on file:  [x] yes   [] No  Med contract signed in     Last urine drug screen: 11/15/2019  Consistent with medication(s):    [x] yes   [] no    Last OARRS ran: unknown    Quantity of medication remainin pills    Who will be picking rx up: Delivery    Pharmacy if escribed: [facility]

## 2024-12-31 ENCOUNTER — HOSPITAL ENCOUNTER (OUTPATIENT)
Age: 84
Discharge: HOME | End: 2024-12-31
Payer: MEDICARE

## 2024-12-31 ENCOUNTER — OFFICE VISIT (OUTPATIENT)
Dept: INTERNAL MEDICINE CLINIC | Age: 84
End: 2024-12-31
Payer: MEDICARE

## 2024-12-31 VITALS
SYSTOLIC BLOOD PRESSURE: 124 MMHG | OXYGEN SATURATION: 97 % | HEART RATE: 85 BPM | BODY MASS INDEX: 19.44 KG/M2 | HEIGHT: 66 IN | WEIGHT: 121 LBS | DIASTOLIC BLOOD PRESSURE: 82 MMHG

## 2024-12-31 DIAGNOSIS — E44.0 MODERATE PROTEIN-CALORIE MALNUTRITION (HCC): Primary | ICD-10-CM

## 2024-12-31 DIAGNOSIS — E44.0 MODERATE PROTEIN-CALORIE MALNUTRITION (HCC): ICD-10-CM

## 2024-12-31 DIAGNOSIS — F41.9 ANXIETY: Primary | ICD-10-CM

## 2024-12-31 DIAGNOSIS — H04.123 DRY EYES, BILATERAL: ICD-10-CM

## 2024-12-31 LAB
25(OH)D3 SERPL-MCNC: 33.1 NG/ML (ref 30–100)
CRP SERPL HS-MCNC: <3 MG/L (ref 0–5)
ERYTHROCYTE [SEDIMENTATION RATE] IN BLOOD BY PHOTOMETRIC METHOD: 7 MM/HR (ref 0–30)
FOLATE SERPL-MCNC: 19.7 NG/ML (ref 4.8–24.2)
HIV 1+2 AB+HIV1 P24 AG SERPL QL IA: NONREACTIVE
VIT B12 SERPL-MCNC: 1051 PG/ML (ref 232–1245)

## 2024-12-31 PROCEDURE — 85652 RBC SED RATE AUTOMATED: CPT

## 2024-12-31 PROCEDURE — 82746 ASSAY OF FOLIC ACID SERUM: CPT

## 2024-12-31 PROCEDURE — 1159F MED LIST DOCD IN RCRD: CPT | Performed by: INTERNAL MEDICINE

## 2024-12-31 PROCEDURE — 1123F ACP DISCUSS/DSCN MKR DOCD: CPT | Performed by: INTERNAL MEDICINE

## 2024-12-31 PROCEDURE — 3074F SYST BP LT 130 MM HG: CPT | Performed by: INTERNAL MEDICINE

## 2024-12-31 PROCEDURE — 87389 HIV-1 AG W/HIV-1&-2 AB AG IA: CPT

## 2024-12-31 PROCEDURE — 99214 OFFICE O/P EST MOD 30 MIN: CPT | Performed by: INTERNAL MEDICINE

## 2024-12-31 PROCEDURE — 36415 COLL VENOUS BLD VENIPUNCTURE: CPT

## 2024-12-31 PROCEDURE — 82306 VITAMIN D 25 HYDROXY: CPT

## 2024-12-31 PROCEDURE — 3079F DIAST BP 80-89 MM HG: CPT | Performed by: INTERNAL MEDICINE

## 2024-12-31 PROCEDURE — 82607 VITAMIN B-12: CPT

## 2024-12-31 PROCEDURE — 86140 C-REACTIVE PROTEIN: CPT

## 2024-12-31 RX ORDER — POLYVINYL ALCOHOL 14 MG/ML
2 SOLUTION/ DROPS OPHTHALMIC PRN
Qty: 1 EACH | Refills: 2 | Status: SHIPPED | OUTPATIENT
Start: 2024-12-31

## 2024-12-31 RX ORDER — LORAZEPAM 0.5 MG/1
0.5 TABLET ORAL EVERY 8 HOURS PRN
Qty: 30 TABLET | Refills: 0 | Status: SHIPPED | OUTPATIENT
Start: 2024-12-31 | End: 2025-01-30

## 2024-12-31 RX ORDER — LATANOPROST 50 UG/ML
2 SOLUTION/ DROPS OPHTHALMIC DAILY
Qty: 7.5 ML | Refills: 0 | Status: SHIPPED | OUTPATIENT
Start: 2024-12-31 | End: 2025-03-16

## 2024-12-31 NOTE — TELEPHONE ENCOUNTER
Pharmacy called stating they are out of the 1MG Ativan tablets and would like the order changed to 0.5mg  tablets. I gave the verbal ok to the pharmacy but they still need the order changed.    I spoke with Dr. Bustos about this.    Dr. Bustos I have pended the medication please make any changes to order if needed.

## 2024-12-31 NOTE — PROGRESS NOTES
\"Have you been to the ER, urgent care clinic since your last visit?  Hospitalized since your last visit?\"    YES, [facility], 12/18/24, [facility]    “Have you seen or consulted any other health care providers outside our system since your last visit?”    NO               SUBJECTIVE:  Trisha Freed is a 84 y.o. female patient who  comes for complaints of   Chief Complaint   Patient presents with    Results     CT     Fatigue     Fatigue   Anxiety improved with remeron but fatigue not much better.     Wt Readings from Last 3 Encounters:   12/31/24 54.9 kg (121 lb)   12/18/24 54.9 kg (121 lb)   11/29/24 54.9 kg (121 lb)     Wt in June 2024 was 56.6kg     Taking ensure daily   remeron was given for appetite- tolerating   No diarrhea vomiting   Discussed healthy diet and exercise   Taking multivitamin  Check Vit d, b12,  TSH , LFT normal recently     Anxieyt  Reports improvement with remeron  States dizziness is better, no episodes in last months     Lung mass  Was diagnosed in October  Was referred to pulmonology   Has appt with Oncology- in [place] but did not go as she doesn't want to keep going to [place] ( I advised she can have subsweuqent appt in [place] even if she has initial appt in [place])   Pt advised to make pulm and or oncology appt as early as possible    Deneis alcohol, smoking       REVIEW OF SYSTEMS (except Subjective (HPI))  GENERAL: No fevers / chills  RESPIRATORY: Negative for cough, wheezing or shortness of breath  CARDIOVASCULAR: Negative for chest pain or palpitations.  GI: no nausea, vomiting, or diarrhea  NEURO: No history of headaches    Past Medical History:   Diagnosis Date    Anemia     Anxiety     Arthritis     Tejada's palsy     Bright red blood per rectum     Cataracts, bilateral     DDD (degenerative disc disease)     Gastric ulcer 2014    per egd    Hepatitis 1965    Hyperlipidemia     Hypertension     Inguinal hernia     MRSA (methicillin resistant staph aureus) culture positive

## 2025-01-02 RX ORDER — CHOLECALCIFEROL (VITAMIN D3) 25 MCG
1000 TABLET ORAL DAILY
Qty: 90 TABLET | Refills: 1 | Status: SHIPPED | OUTPATIENT
Start: 2025-01-02

## 2025-01-06 ENCOUNTER — OFFICE VISIT (OUTPATIENT)
Dept: PODIATRY | Age: 85
End: 2025-01-06
Payer: MEDICARE

## 2025-01-06 VITALS — WEIGHT: 121 LBS | HEIGHT: 67 IN | BODY MASS INDEX: 18.99 KG/M2

## 2025-01-06 DIAGNOSIS — B35.1 ONYCHOMYCOSIS OF TOENAIL: Primary | ICD-10-CM

## 2025-01-06 DIAGNOSIS — M79.674 PAIN OF TOES OF BOTH FEET: ICD-10-CM

## 2025-01-06 DIAGNOSIS — M79.675 PAIN OF TOES OF BOTH FEET: ICD-10-CM

## 2025-01-06 PROCEDURE — 11721 DEBRIDE NAIL 6 OR MORE: CPT | Performed by: PODIATRIST

## 2025-01-06 PROCEDURE — 1126F AMNT PAIN NOTED NONE PRSNT: CPT | Performed by: PODIATRIST

## 2025-01-06 PROCEDURE — 1159F MED LIST DOCD IN RCRD: CPT | Performed by: PODIATRIST

## 2025-01-06 PROCEDURE — 1123F ACP DISCUSS/DSCN MKR DOCD: CPT | Performed by: PODIATRIST

## 2025-01-06 PROCEDURE — 99203 OFFICE O/P NEW LOW 30 MIN: CPT | Performed by: PODIATRIST

## 2025-01-06 ASSESSMENT — ENCOUNTER SYMPTOMS
SHORTNESS OF BREATH: 0
BACK PAIN: 0
COLOR CHANGE: 0
DIARRHEA: 0
NAUSEA: 0

## 2025-01-06 NOTE — PROGRESS NOTES
Trisah Freed is a 84 y.o. female who presents to the office today with chief complaint of thick painful toenails to both feet.  Chief Complaint   Patient presents with    Nail Problem     Nail trim/last saw Dr.Vaishali Wolf 12/31/24   Symptoms began greater than 1 year(s) ago. Patient denies injury to the feet. Patient states that her toenails are thick and are painful with shoe gear and ambulation. Pain is rated 5 out of 10 at it's worst and is described as intermittent. Treatments prior to today's visit include: None.      Allergies   Allergen Reactions    Bee Pollen Anaphylaxis    Iron Rash       Past Medical History:   Diagnosis Date    Anemia     Anxiety     Arthritis     Tejada's palsy     Bright red blood per rectum     Cataracts, bilateral     DDD (degenerative disc disease)     Gastric ulcer 2014    per egd    Hepatitis 1965    Hyperlipidemia     Hypertension     Inguinal hernia     MRSA (methicillin resistant staph aureus) culture positive 02/22/2017    urine    Osteoarthritis     Retinopathy        Prior to Admission medications    Medication Sig Start Date End Date Taking? Authorizing Provider   vitamin D3 (CHOLECALCIFEROL) 25 MCG (1000 UT) TABS tablet Take 1 tablet by mouth daily 1/2/25  Yes Henri Bustos MD   LORazepam (ATIVAN) 0.5 MG tablet Take 1 tablet by mouth every 8 hours as needed for Anxiety for up to 30 days. Max Daily Amount: 1.5 mg 12/31/24 1/30/25 Yes Henri Bustos MD   latanoprost (XALATAN) 0.005 % ophthalmic solution Place 2 drops into both eyes daily 12/31/24 3/16/25 Yes Yessenia Wolf MD   polyvinyl alcohol (LIQUIFILM TEARS) 1.4 % ophthalmic solution Place 2 drops into both eyes as needed for Dry Eyes 12/31/24  Yes Yessenia Wolf MD   LORazepam (ATIVAN) 1 MG tablet Take 0.5 tablets by mouth daily as needed for Anxiety for up to 15 days. Max Daily Amount: 0.5 mg 12/30/24 1/14/25 Yes Henri Bustos MD   mirtazapine (REMERON) 7.5 MG tablet Take 1 tablet by mouth

## 2025-01-09 ENCOUNTER — TRANSCRIBE ORDERS (OUTPATIENT)
Dept: ADMINISTRATIVE | Age: 85
End: 2025-01-09

## 2025-01-09 ENCOUNTER — HOSPITAL ENCOUNTER (OUTPATIENT)
Age: 85
Discharge: HOME | End: 2025-01-09
Payer: MEDICARE

## 2025-01-09 DIAGNOSIS — R91.8 LUNG MASS: Primary | ICD-10-CM

## 2025-01-09 PROCEDURE — 86480 TB TEST CELL IMMUN MEASURE: CPT

## 2025-01-09 PROCEDURE — 36415 COLL VENOUS BLD VENIPUNCTURE: CPT

## 2025-01-12 LAB
QUANTI TB GOLD PLUS: POSITIVE
QUANTI TB1 MINUS NIL: 9.12 IU/ML
QUANTI TB2 MINUS NIL: 9.12 IU/ML
QUANTIFERON MITOGEN: 9.12 IU/ML
QUANTIFERON NIL: 0.88 IU/ML

## 2025-01-22 DIAGNOSIS — I10 ESSENTIAL HYPERTENSION: ICD-10-CM

## 2025-01-22 RX ORDER — LISINOPRIL 10 MG/1
TABLET ORAL
Qty: 360 TABLET | Refills: 2 | Status: SHIPPED | OUTPATIENT
Start: 2025-01-22

## 2025-01-27 ENCOUNTER — TELEPHONE (OUTPATIENT)
Dept: INTERNAL MEDICINE CLINIC | Age: 85
End: 2025-01-27

## 2025-01-27 DIAGNOSIS — R76.12 POSITIVE QUANTIFERON-TB GOLD TEST: Primary | ICD-10-CM

## 2025-01-27 NOTE — TELEPHONE ENCOUNTER
Patient is calling and requesting a chest x-ray for TB so that she can go see Dr. Singh. Please advise.   Home

## 2025-01-30 ENCOUNTER — HOSPITAL ENCOUNTER (OUTPATIENT)
Dept: GENERAL RADIOLOGY | Age: 85
Discharge: HOME | End: 2025-02-01
Payer: MEDICARE

## 2025-01-30 ENCOUNTER — HOSPITAL ENCOUNTER (OUTPATIENT)
Age: 85
Discharge: HOME | End: 2025-02-01
Payer: MEDICARE

## 2025-01-30 DIAGNOSIS — R76.12 POSITIVE QUANTIFERON-TB GOLD TEST: ICD-10-CM

## 2025-01-30 PROCEDURE — 71046 X-RAY EXAM CHEST 2 VIEWS: CPT

## 2025-02-10 DIAGNOSIS — F41.9 ANXIETY: ICD-10-CM

## 2025-02-10 RX ORDER — LORAZEPAM 0.5 MG/1
0.5 TABLET ORAL EVERY 8 HOURS PRN
Qty: 30 TABLET | Refills: 0 | Status: SHIPPED | OUTPATIENT
Start: 2025-02-10 | End: 2025-03-12

## 2025-02-10 NOTE — TELEPHONE ENCOUNTER
Medication Requested: Ativan    Last visit: 2024  Next visit: Visit date not found  Last refill: 2024    Med contract on file:  [] yes   [x] no    Last urine drug screen: N/A  Consistent with medication(s):    [x] yes   [] no    Last OARRS ran: Unknown    Quantity of medication remainin    Who will be picking rx up: Delivery    Pharmacy if escribed: [facility]

## 2025-03-10 DIAGNOSIS — F41.9 ANXIETY: ICD-10-CM

## 2025-03-10 RX ORDER — LORAZEPAM 0.5 MG/1
TABLET ORAL
Qty: 30 TABLET | Refills: 0 | Status: SHIPPED | OUTPATIENT
Start: 2025-03-10 | End: 2025-04-09

## 2025-03-19 ENCOUNTER — OFFICE VISIT (OUTPATIENT)
Dept: INFECTIOUS DISEASES | Age: 85
End: 2025-03-19
Payer: MEDICARE

## 2025-03-19 VITALS
SYSTOLIC BLOOD PRESSURE: 126 MMHG | TEMPERATURE: 97 F | BODY MASS INDEX: 18.36 KG/M2 | HEIGHT: 67 IN | DIASTOLIC BLOOD PRESSURE: 84 MMHG | WEIGHT: 117 LBS | HEART RATE: 60 BPM

## 2025-03-19 DIAGNOSIS — J98.4 CAVITATING MASS IN LEFT UPPER LUNG LOBE: ICD-10-CM

## 2025-03-19 DIAGNOSIS — R63.4 WEIGHT LOSS: ICD-10-CM

## 2025-03-19 DIAGNOSIS — Z22.7 TB LUNG, LATENT: Primary | ICD-10-CM

## 2025-03-19 PROCEDURE — 3079F DIAST BP 80-89 MM HG: CPT | Performed by: INTERNAL MEDICINE

## 2025-03-19 PROCEDURE — 1159F MED LIST DOCD IN RCRD: CPT | Performed by: INTERNAL MEDICINE

## 2025-03-19 PROCEDURE — 99204 OFFICE O/P NEW MOD 45 MIN: CPT | Performed by: INTERNAL MEDICINE

## 2025-03-19 PROCEDURE — 1123F ACP DISCUSS/DSCN MKR DOCD: CPT | Performed by: INTERNAL MEDICINE

## 2025-03-19 PROCEDURE — 3074F SYST BP LT 130 MM HG: CPT | Performed by: INTERNAL MEDICINE

## 2025-03-19 ASSESSMENT — ENCOUNTER SYMPTOMS
ABDOMINAL DISTENTION: 0
SHORTNESS OF BREATH: 0
EYE DISCHARGE: 0
COLOR CHANGE: 0
COUGH: 0
APNEA: 0

## 2025-03-19 NOTE — PROGRESS NOTES
07/08/2023 11:10 AM    BILIDIR 0.17 09/12/2014 04:23 AM    IBILI 0.5 07/08/2023 11:10 AM    IBILI 0.69 09/12/2014 04:23 AM    BILITOT 0.8 12/18/2024 02:40 PM    BILITOT 0.7 10/02/2024 01:02 PM    ALKPHOS 79 12/18/2024 02:40 PM    ALKPHOS 73 10/02/2024 01:02 PM    ALT 11 12/18/2024 02:40 PM    ALT 15 10/02/2024 01:02 PM    AST 22 12/18/2024 02:40 PM    AST 17 10/02/2024 01:02 PM     No results found for: \"RPR\"  No results found for: \"HIV\"  No results found for: \"BC\"  Lab Results   Component Value Date/Time    BACTERIA MODERATE 06/20/2024 09:42 PM    MUCUS NOT REPORTED 12/16/2021 10:44 AM    RBC 4.57 12/18/2024 02:40 PM    TRICHOMONAS NOT REPORTED 12/16/2021 10:44 AM    WBC 5.0 12/18/2024 02:40 PM    YEAST NOT REPORTED 12/16/2021 10:44 AM    TURBIDITY Clear 11/29/2024 07:11 PM     Lab Results   Component Value Date/Time    CREATININE 0.7 12/18/2024 02:40 PM    GLUCOSE 87 12/18/2024 02:40 PM   you for allowing us to participate in the care of this patient. Please call with questions.      Courtney Singh MD  - Office: [phone]    Please note that this chart was generated using voice recognition Dragon dictation software.  Although every effort was made to ensure the accuracy of this automated transcription, some errors in transcription mayhave occurred.

## 2025-04-10 DIAGNOSIS — F41.9 ANXIETY: ICD-10-CM

## 2025-04-10 RX ORDER — LORAZEPAM 0.5 MG/1
0.5 TABLET ORAL EVERY 8 HOURS PRN
Qty: 30 TABLET | Refills: 3 | Status: SHIPPED | OUTPATIENT
Start: 2025-04-10 | End: 2025-05-10

## 2025-04-10 NOTE — TELEPHONE ENCOUNTER
Medication Requested: Lorazepam     Last visit: 12/31/2024  Next visit: Visit date not found  Last refill: 3/10/2025    Med contract on file:  [] yes   [] no    Last urine drug screen:   Consistent with medication(s):    [] yes   [] no    Last OARRS ran: unknown    Quantity of medication remaining:     Who will be picking rx up:     Pharmacy if escribed: [facility]

## 2025-04-18 ENCOUNTER — HOSPITAL ENCOUNTER (OUTPATIENT)
Dept: NUCLEAR MEDICINE | Age: 85
Discharge: HOME | End: 2025-04-20
Attending: INTERNAL MEDICINE
Payer: MEDICARE

## 2025-04-18 DIAGNOSIS — R91.8 LUNG MASS: ICD-10-CM

## 2025-04-18 LAB — GLUCOSE BLD-MCNC: 108 MG/DL (ref 65–105)

## 2025-04-18 PROCEDURE — 2500000003 HC RX 250 WO HCPCS: Performed by: INTERNAL MEDICINE

## 2025-04-18 PROCEDURE — 3430000000 HC RX DIAGNOSTIC RADIOPHARMACEUTICAL: Performed by: INTERNAL MEDICINE

## 2025-04-18 PROCEDURE — 82947 ASSAY GLUCOSE BLOOD QUANT: CPT

## 2025-04-18 PROCEDURE — A9609 HC RX DIAGNOSTIC RADIOPHARMACEUTICAL: HCPCS | Performed by: INTERNAL MEDICINE

## 2025-04-18 PROCEDURE — 78815 PET IMAGE W/CT SKULL-THIGH: CPT

## 2025-04-18 RX ORDER — SODIUM CHLORIDE 0.9 % (FLUSH) 0.9 %
10 SYRINGE (ML) INJECTION PRN
Status: DISCONTINUED | OUTPATIENT
Start: 2025-04-18 | End: 2025-04-21 | Stop reason: HOSPADM

## 2025-04-18 RX ORDER — FLUDEOXYGLUCOSE F 18 200 MCI/ML
10 INJECTION, SOLUTION INTRAVENOUS
Status: COMPLETED | OUTPATIENT
Start: 2025-04-18 | End: 2025-04-18

## 2025-04-18 RX ADMIN — FLUDEOXYGLUCOSE F 18 10.49 MILLICURIE: 200 INJECTION, SOLUTION INTRAVENOUS at 13:20

## 2025-04-18 RX ADMIN — SODIUM CHLORIDE, PRESERVATIVE FREE 10 ML: 5 INJECTION INTRAVENOUS at 13:22

## 2025-04-22 ENCOUNTER — TELEPHONE (OUTPATIENT)
Dept: INTERNAL MEDICINE CLINIC | Age: 85
End: 2025-04-22

## 2025-04-22 NOTE — TELEPHONE ENCOUNTER
Patient returned call, informed that she does not wish to schedule at this time.   Post Acute Virtual Skilled Nursing Home Subsequent Visit Note     Date of Service: 4/12/2021    Time of Service: 1112    This visit is being performed via video to discuss Skilled Nursing Home Visit and V-Visit Skilled Nursing Home Visit    Clinician Location: [facility] SKILLED NURSING    Jimi is in [place] and his identity has been established.   He was informed that consent to treat includes permission to submit charges to the applicable insurance on file. Jimi was advised regarding the potential risk inherent in video visits, as the assessment may be limited due to what can be seen on the screen which potentially results in an incomplete assessment; as well as either of us may discontinue the video visit if it is felt that the videoconferencing connections are not adequate for his/her situation.        Patient Location: SNF   Participants: patient, nurse    Subacute / Skilled Need: Rehabilitation    PCP: Dylan Marin DO   Patient Care Team:  Dylan Marin DO as PCP - General (Internal Medicine)  Jeff Jenkins MD as Post Acute Facility Provider: Physician (Geriatric Medicine)  Caitlin Payne CNP as Post Acute Facility Provider: APC (Nurse Practitioner - Family)  Attending SNF MD: Jeff Jenkins    Jimi Larson is a 74 year old male presenting to Post Acute Skilled Nursing for: Bilateral ankle fractures  History of Present Illness: Patient is a 74-year-old male history significant for Atrial flutter, hypertension, leukemia status post chemo presented to hospital after a fall.  He was found to have bilateral ankle fractures.  Status post ORIF 2/16/21.  Patient remained at AL while nonweightbearing since 3/12/2021.  On 4/1/2021 his weightbearing was upgraded to weightbearing as tolerated with Cam boots on bilaterally.  He transitioned back to skilled therapy on 4/2/2021.    4/12/21: Patient is resting comfortably, in no acute distress. He denies any chills, fevers, N/V/abd  pain, diarrhea, constipation, hematemesis, black or bloody stools, dyspnea, cough, CP, dizziness, or any other complaints. Denies any pain.  No new concerns reported by patient or staff      HISTORY  Past Medical History:   Diagnosis Date   • Colon polyps    • DVT of lower extremity, bilateral (CMS/HCC)    • Fluttering heart    • Hairy cell leukemia (CMS/HCC)    • History of atrial flutter    • Pulmonary emboli (CMS/HCC)     bilateral   • Seizures (CMS/HCC)       reports that he has never smoked. He has never used smokeless tobacco. He reports that he does not drink alcohol and does not use drugs.  Past Surgical History:   Procedure Laterality Date   • Ablation - atrial flutter     • Hip surgery Right     ORIF   • Splenectomy, total     • Tonsillectomy     • Ventral hernia repair       Family History   Problem Relation Age of Onset   • Cancer, Lung Father      History     Not marked as reviewed during this visit.          PROBLEM LIST:  Specialty Problems     None           DEPRESSION SCREENING:  PHQ 2/9 Test Results  0: Not at all  1: Several days  2: More than half the days  3: Nearly every day      DEPRESSION ASSESSMENT/PLAN:  Depression screening is negative no further plan needed.    ALLERGIES:  Allergies as of 04/12/2021 - Reviewed 04/07/2021   Allergen Reaction Noted   • Moxifloxacin RASH 01/12/2011   • Azithromycin RASH 08/07/2019   • Shellfish allergy   (food or med) DIARRHEA 06/01/2009       CURRENT MEDICATIONS:   Current Outpatient Medications   Medication Sig Dispense Refill   • topiramate (TOPAMAX) 25 MG tablet Take 25 mg by mouth daily.     • acetaminophen (TYLENOL) 325 MG tablet Take 650 mg by mouth every 6 hours as needed.     • docusate sodium-sennosides (SENOKOT S) 50-8.6 MG per tablet Take 1 tablet by mouth 2 times daily.     • polyethylene glycol (MIRALAX) 17 g packet Take 17 g by mouth daily. Stir and dissolve powder in any 4 to 8 ounces of beverage, then drink.     • melatonin 3 MG Take 1  tablet by mouth nightly as needed.     • calcium carbonate-vitamin D 600-200 MG-UNIT tablet Take 1 tablet by mouth daily.     • VITAMIN D, CHOLECALCIFEROL, PO Take 2,000 Units by mouth daily.     • hydrochlorothiazide (HYDRODIURIL) 12.5 MG tablet TAKE 1 TABLET BY MOUTH DAILY 90 tablet 3   • ondansetron (ZOFRAN) 4 MG tablet Take 4 mg by mouth daily as needed.     • bisacodyl (DULCOLAX) 10 MG suppository Place 10 mg rectally daily as needed.     • rivaroxaban (XARELTO) 10 MG Tab Take 1 tablet by mouth daily.     • atorvastatin (LIPITOR) 10 MG tablet TAKE 1 TABLET BY MOUTH EVERY DAY 90 tablet 0   • alendronate (FOSAMAX) 70 MG tablet TAKE 1 TABLET BY MOUTH EVERY WEEK 4 tablet 2   • tamsulosin (FLOMAX) 0.4 MG Cap Take 1 capsule by mouth daily.       No current facility-administered medications for this visit.     Medications reviewed / reconciled: Yes    BASELINE FUNCTIONAL STATUS:  Independent    CURRENT FUNCTIONAL STATUS:  Non weight bearing (NWB) BLE)  2/24/2021: Nonambulatory, transfers min a with sliding board, bed mobility contact-guard assist,  dressing uppers standby assist, lowers max A, bathing uppers min A, lowers max A, toilet transfers not tested    2/26/2021: Nonambulatory: Sliding board standby assist, bed mobility standby assist/supervision, dressing bathing uppers standby assist, dressing lowers mod/max A, bathing lowers min A, toilet transfers not tested hygiene max A    3/1: Nonambulatory, transfers sliding board standby assist, bed mobility standby assist/supervision, dressing uppers set up, lowers min A, bathing uppers standby assist, lowers mod A, toilet transfers NT, hygiene max A, feeding independent    3/3/2021: Nonambulatory, transfers sliding board supervision/set up, bed mobility supervision, dressing uppers supervision/set up, lowers mod A, bathing uppers standby assist, lowers max A, toilet transfers min A, hygiene mod A     3/8/2020 nonambulatory, transfers set up, bed mobility modified  independent, dressing uppers modified independent, lowers min A, bathing uppers supervision, lowers min A, toilet transfers contact-guard assist, hygiene supervision, feeding independent    4/5/2021: Bed mobility contact-guard assist, transfers min A, ambulate 75 feet rolling walker min A, upper extremity dressing independent, lower extremity dressing min A, toileting min A    4/7/2021: Ambulates 150 feet x 1 rolling walker, transfers standby assist, bed mobility independent, dressing uppers modified independent, lowers min A, bathing uppers supervision, lowers min A, toilet transfer standby assist, hygiene supervision, feeding independent    April 12, 2021: Ambulates 300 feet rolling walker supervision, 12 steps  cane plus railing, transfers supervision, bed mobility modified independent, dressing uppers modified independent, lowers min A, bathing uppers supervision, lowers min A, toileting supervision set up, hygiene modified independent, feeding independent    DIET:  Consistency: General   Type: regular  Appetite: Normal    REVIEW OF SYSTEMS:  Review of Systems   Constitutional: Negative for activity change, chills, fatigue and fever.   HENT: Negative for congestion, ear pain and sinus pressure.    Eyes: Negative.  Negative for discharge and visual disturbance.   Respiratory: Negative for cough, shortness of breath and wheezing.    Cardiovascular: Negative for chest pain and leg swelling.   Gastrointestinal: Negative for abdominal distention, abdominal pain, constipation, diarrhea, nausea and vomiting.   Endocrine: Negative for cold intolerance.   Genitourinary: Negative for difficulty urinating, hematuria and urgency.   Musculoskeletal: Negative for joint swelling and neck pain.   Skin: Negative for color change and rash.   Neurological: Negative for dizziness, numbness and headaches.   Psychiatric/Behavioral: Negative for confusion.       PHYSICAL ASSESSMENT:  Examined by nurse  Physical Exam  Constitutional:        Appearance: He is well-developed.   HENT:      Head: Normocephalic and atraumatic.   Eyes:      Conjunctiva/sclera: Conjunctivae normal.      Pupils: Pupils are equal, round, and reactive to light.   Cardiovascular:      Rate and Rhythm: Normal rate and regular rhythm.      Heart sounds: Normal heart sounds.   Pulmonary:      Effort: No respiratory distress.      Breath sounds: Normal breath sounds. No wheezing.   Abdominal:      General: Bowel sounds are normal. There is no distension.      Palpations: Abdomen is soft.      Tenderness: There is no abdominal tenderness.   Musculoskeletal:         General: No tenderness. Normal range of motion.      Cervical back: Neck supple.      Comments: Bilateral lower extremity with Cam boots, intact distal CMS. trace edema bilaterally   Skin:     General: Skin is warm and dry.   Neurological:      General: No focal deficit present.      Mental Status: He is alert and oriented to person, place, and time.   Psychiatric:         Mood and Affect: Mood normal.         Behavior: Behavior normal.         VITALS:  Visit Vitals  /67   Pulse 81   Temp 97.6 °F (36.4 °C)   Resp 16   Wt 99.4 kg (219 lb 3.2 oz)   SpO2 98%   BMI 32.37 kg/m²         Pain Level 0 /10    LABS:      4/12/2021: White blood count 9.0, hemoglobin 15.1, platelets 259, sodium 137, potassium 3.7, BUN 16, creatinine 0.8, glucose 76, AST 17, ALT 18, albumin 3.8, alk phosphatase 58, total bilirubin 0.5, magnesium 1.7, phosphorus 3.0    4/5/2021: White blood count 8.1, hemoglobin 13.8, platelets 256, sodium 139, potassium 3.7, BUN 12, creatinine 0.8, glucose 73, AST 17, ALT 18, albumin 3.3, alk phosphatase 55, total bilirubin 0.5    3/8/2021: White blood count 7.7, hemoglobin 14.1, platelets 367, sodium 133, potassium 4.0, BUN 16, creatinine 0.8, glucose 75, AST 24, ALT 20, alk phosphatase 81, albumin 3.6, total bilirubin 0.8, magnesium 1.8, phosphorus 3.1    3/2/2021: Sodium 134, potassium 4.2, BUN 19,  creatinine 0.8, glucose 85  3/1/2021: White blood count 9.5, hemoglobin 13.7, platelets 454, magnesium 2.6, phosphorus 3.0, Sodium 133, potassium 5.4, BUN 19, creatinine 0.8, glucose 76, albumin 3.5, AST 20, ALT 28, alk phosphatase 76 magnesium 2.6, phosphorus 3.0    2/25/2021: White blood count 9.7, hemoglobin 13.6, platelets 401, sodium 133, potassium 4.4, BUN 20, creatinine 0.7, glucose 100  22 2021: White blood count 11.5, hemoglobin 13.5, platelets 325, sodium 131, potassium 3.9, BUN 21, creatinine 0.7, AST 30, ALT 42, albumin 3.3, magnesium 1.8, phosphorus 3.3.    2/19/2021: White blood count 9.3, hemoglobin 13.6, platelets 194, sodium 133, potassium 3.4, BUN 17, creatinine 0.8, glucose 103, ALT 32, AST 45, alk phosphatase 64, total bilirubin 0.7, albumin 3.3, calcium 8.0, magnesium 2.0, phosphorus 2.5    2/15 2021: White blood count 11.8, hemoglobin 15.6, platelets 224 sodium 139, potassium 3.7, BUN 18, creatinine 1.14, alk phosphatase 57, ALT 27, AST 28, total bilirubin 0.5    Advance Care Planning     Advance Care Planning Visit     Patient and/or decision maker consent to Advance Care Planning visit.    Inclusion criteria: Inclusion criteria not met, but goals of care discussion needed. Use .[facility] to document goals of care discussion    PCP:  LEXY Marin    Participants:  Patient, APN    Location:  [facility]    Purpose of the Meeting:  Advance Care Planning and Goals of Care discussion    Is the patient capable of making healthcare decisions: Yes, the patient is able to receive, process, and then give feedback to information regarding medical discussions.   Decision Maker: patient    Does the patient have an Advance Care Directive document in Epic? No, a consult for Social Work to assist the patient in completion of an Advance Care Directive document has been placed.     After code status discussion, the patient has decided on: Full Resuscitation       Goals of Care:  Patient has one or more life-limiting  conditions: No    A discussion of the patient's Goals of Care has been completed with patient regarding the following:  (1) Current Serious Conditions: Hx leukemia  (2) Prognosis: FAIR     Function: Decline in function and Decline in mobility  (3) Patient-Centered Goals: Aggressive, usual medical care  (4) Plan for Future Deterioration: Would be ok with returning to hospital for care.  Would be ok with returning to ICU for care.    The patient verbalized understanding of the above discussion.    The following additional care is recommended: None           ASSESSMENT AND PLAN    Closed bimalleolar fracture with dislocation status post ORIF by Dr. Jeff Mora 2/16/21. NWB.  F/u ortho 4/1: WBAT w/ camboots on; f/u with ortho 4/16  Cont PT/OT.     BPH: Asymptomatic. Continue Flomax    Dyslipidemia: Continue statin    Hypertension: Cont present regimen, trend    Insomnia:cont  malatonin    History of hairy cell leukemia: In remission    Hyponatremia: resolved    Hx seizures: cont topiramate    Hx DVT/PE: cont ppx dose xarelto     DVT prophylaxis: Continue Xarelto per Ortho recommendation    Bowel regimen: MiraLAX daily, senna S, Dulcolax suppository as needed        FOLLOW UP APPOINTMENTS:  No follow-ups on file.   Ortho DR. Mora 4/16/21    DISCHARGE PLANNING: Home HH. Patient has 5 outdoor steps and 13 indoor steps.  He lives with his spouse.    Discussed with: RN / Nursing, Patient, MD, SW/ and Reviewed old records    Prognosis: fair     Spent 26 minutes total time, greater than 50% spent in face-to-face, reviewing records and coordination of care.      Caitlin Payne, CNP

## 2025-05-28 DIAGNOSIS — F41.9 ANXIETY: ICD-10-CM

## 2025-05-28 RX ORDER — MIRTAZAPINE 7.5 MG/1
TABLET, FILM COATED ORAL
Qty: 90 TABLET | Refills: 1 | Status: SHIPPED | OUTPATIENT
Start: 2025-05-28

## 2025-05-31 ENCOUNTER — APPOINTMENT (OUTPATIENT)
Dept: CT IMAGING | Age: 85
End: 2025-05-31
Payer: MEDICARE

## 2025-05-31 ENCOUNTER — HOSPITAL ENCOUNTER (EMERGENCY)
Age: 85
Discharge: HOME | End: 2025-05-31
Attending: EMERGENCY MEDICINE
Payer: MEDICARE

## 2025-05-31 VITALS
DIASTOLIC BLOOD PRESSURE: 65 MMHG | HEIGHT: 67 IN | OXYGEN SATURATION: 94 % | TEMPERATURE: 97.9 F | RESPIRATION RATE: 18 BRPM | WEIGHT: 110 LBS | HEART RATE: 99 BPM | BODY MASS INDEX: 17.27 KG/M2 | SYSTOLIC BLOOD PRESSURE: 171 MMHG

## 2025-05-31 DIAGNOSIS — K59.00 CONSTIPATION, UNSPECIFIED CONSTIPATION TYPE: Primary | ICD-10-CM

## 2025-05-31 PROCEDURE — 74176 CT ABD & PELVIS W/O CONTRAST: CPT

## 2025-05-31 PROCEDURE — 99284 EMERGENCY DEPT VISIT MOD MDM: CPT

## 2025-05-31 RX ADMIN — Medication 240 ML: at 17:05

## 2025-05-31 ASSESSMENT — ENCOUNTER SYMPTOMS
COUGH: 0
VOMITING: 0
CHEST TIGHTNESS: 0
WHEEZING: 0
EYE DISCHARGE: 0
BACK PAIN: 1
EYE REDNESS: 0
RHINORRHEA: 0
SINUS PRESSURE: 0
ABDOMINAL PAIN: 0
COLOR CHANGE: 0
SORE THROAT: 0
CONSTIPATION: 1
SHORTNESS OF BREATH: 0
FACIAL SWELLING: 0
TROUBLE SWALLOWING: 0
EYE PAIN: 0
DIARRHEA: 0
NAUSEA: 0
BLOOD IN STOOL: 0

## 2025-05-31 NOTE — ED PROVIDER NOTES
eMERGENCY dEPARTMENT eNCOUnter      Pt Name: Trisha Freed  MRN: [id number]  Birthdate 1940  Date of evaluation: 5/31/25      CHIEF COMPLAINT       Chief Complaint   Patient presents with    Constipation         HISTORY OF PRESENT ILLNESS    Trisha Freed is a 84 y.o. female who presents complaining of constipation.  Patient states that she has been feeling like she needs to have a bowel movement for the last 4 days and has been unable to have 1.  Patient states she is still passing gas.  Patient is having a lot of pain mostly in the left back area but she has a lot of chronic pain issues from osteoarthritis.  Patient states that she has had no difficulty urinating.  Patient has no abdominal pain.  Patient denies any other symptoms.      REVIEW OF SYSTEMS       Review of Systems   Constitutional:  Negative for activity change, appetite change, chills, diaphoresis and fever.   HENT:  Negative for congestion, ear pain, facial swelling, nosebleeds, rhinorrhea, sinus pressure, sore throat and trouble swallowing.    Eyes:  Negative for pain, discharge and redness.   Respiratory:  Negative for cough, chest tightness, shortness of breath and wheezing.    Cardiovascular:  Negative for chest pain, palpitations and leg swelling.   Gastrointestinal:  Positive for constipation. Negative for abdominal pain, blood in stool, diarrhea, nausea and vomiting.   Genitourinary:  Negative for difficulty urinating, dysuria, flank pain, frequency, genital sores and hematuria.   Musculoskeletal:  Positive for back pain. Negative for arthralgias, gait problem, joint swelling, myalgias and neck pain.   Skin:  Negative for color change, pallor, rash and wound.   Neurological:  Negative for dizziness, tremors, seizures, syncope, speech difficulty, weakness, numbness and headaches.   Psychiatric/Behavioral:  Negative for confusion, decreased concentration, hallucinations, self-injury, sleep disturbance and suicidal ideas.        PAST MEDICAL

## 2025-06-03 ENCOUNTER — TELEPHONE (OUTPATIENT)
Dept: INTERNAL MEDICINE CLINIC | Age: 85
End: 2025-06-03

## 2025-06-03 NOTE — TELEPHONE ENCOUNTER
----- Message from Perry WASHBURN sent at 6/2/2025  4:18 PM EDT -----  Regarding: ECC Appointment Request  ECC Appointment Request    Patient needs appointment for ECC Appointment Type: ED Follow-Up.    Patient Requested Dates(s): As soon as possible  Patient Requested Time: 3:00 or 3:00 pm  Provider Name: Kevin Karimi NATALIIA    Reason for Appointment Request: Established Patient - Available appointments did not meet patient need    Patient Is calling to make an appointment to see her provider after her emergency room visit due to constipation last week's Saturday.  --------------------------------------------------------------------------------------------------------------------------    Relationship to Patient: Self     Call Back Information: OK to leave message on voicemail  Preferred Call Back Number: Phone [phone]

## 2025-06-09 DIAGNOSIS — I10 ESSENTIAL HYPERTENSION: ICD-10-CM

## 2025-06-09 RX ORDER — METOPROLOL TARTRATE 25 MG/1
TABLET, FILM COATED ORAL
Qty: 180 TABLET | Refills: 3 | Status: SHIPPED | OUTPATIENT
Start: 2025-06-09

## 2025-06-12 ENCOUNTER — RESULTS FOLLOW-UP (OUTPATIENT)
Dept: INTERNAL MEDICINE CLINIC | Age: 85
End: 2025-06-12

## 2025-06-12 ENCOUNTER — OFFICE VISIT (OUTPATIENT)
Dept: INTERNAL MEDICINE CLINIC | Age: 85
End: 2025-06-12
Payer: MEDICARE

## 2025-06-12 VITALS
BODY MASS INDEX: 18.05 KG/M2 | OXYGEN SATURATION: 96 % | SYSTOLIC BLOOD PRESSURE: 118 MMHG | HEIGHT: 67 IN | DIASTOLIC BLOOD PRESSURE: 70 MMHG | WEIGHT: 115 LBS | HEART RATE: 86 BPM

## 2025-06-12 DIAGNOSIS — R91.8 LUNG MASS: ICD-10-CM

## 2025-06-12 DIAGNOSIS — R63.4 WEIGHT LOSS: ICD-10-CM

## 2025-06-12 DIAGNOSIS — K59.00 CONSTIPATION, UNSPECIFIED CONSTIPATION TYPE: Primary | ICD-10-CM

## 2025-06-12 DIAGNOSIS — N39.0 RECURRENT UTI: ICD-10-CM

## 2025-06-12 DIAGNOSIS — R53.83 FATIGUE, UNSPECIFIED TYPE: ICD-10-CM

## 2025-06-12 LAB
BILIRUBIN, POC: NORMAL
BLOOD URINE, POC: NORMAL
CLARITY, POC: CLEAR
COLOR, POC: YELLOW
GLUCOSE URINE, POC: NORMAL MG/DL
KETONES, POC: NORMAL MG/DL
LEUKOCYTE EST, POC: NORMAL
NITRITE, POC: NORMAL
PH, POC: 6
PROTEIN, POC: NORMAL MG/DL
SPECIFIC GRAVITY, POC: 1.01
UROBILINOGEN, POC: NORMAL MG/DL

## 2025-06-12 PROCEDURE — 1159F MED LIST DOCD IN RCRD: CPT | Performed by: NURSE PRACTITIONER

## 2025-06-12 PROCEDURE — 1123F ACP DISCUSS/DSCN MKR DOCD: CPT | Performed by: NURSE PRACTITIONER

## 2025-06-12 PROCEDURE — 3078F DIAST BP <80 MM HG: CPT | Performed by: NURSE PRACTITIONER

## 2025-06-12 PROCEDURE — 3074F SYST BP LT 130 MM HG: CPT | Performed by: NURSE PRACTITIONER

## 2025-06-12 PROCEDURE — 99214 OFFICE O/P EST MOD 30 MIN: CPT | Performed by: NURSE PRACTITIONER

## 2025-06-12 PROCEDURE — 81002 URINALYSIS NONAUTO W/O SCOPE: CPT | Performed by: NURSE PRACTITIONER

## 2025-06-12 RX ORDER — DOCUSATE SODIUM 100 MG/1
100 CAPSULE, LIQUID FILLED ORAL 2 TIMES DAILY
Qty: 60 CAPSULE | Refills: 2 | Status: SHIPPED | OUTPATIENT
Start: 2025-06-12 | End: 2025-09-10

## 2025-06-12 RX ORDER — LORAZEPAM 0.5 MG/1
TABLET ORAL
COMMUNITY
Start: 2025-06-05

## 2025-06-12 RX ORDER — ALBUTEROL SULFATE 90 UG/1
INHALANT RESPIRATORY (INHALATION)
COMMUNITY
Start: 2025-04-09

## 2025-06-12 SDOH — ECONOMIC STABILITY: FOOD INSECURITY: WITHIN THE PAST 12 MONTHS, THE FOOD YOU BOUGHT JUST DIDN'T LAST AND YOU DIDN'T HAVE MONEY TO GET MORE.: NEVER TRUE

## 2025-06-12 SDOH — ECONOMIC STABILITY: FOOD INSECURITY: WITHIN THE PAST 12 MONTHS, YOU WORRIED THAT YOUR FOOD WOULD RUN OUT BEFORE YOU GOT MONEY TO BUY MORE.: NEVER TRUE

## 2025-06-12 ASSESSMENT — ENCOUNTER SYMPTOMS
COUGH: 1
SHORTNESS OF BREATH: 0
BACK PAIN: 1
CONSTIPATION: 1
WHEEZING: 0

## 2025-06-12 ASSESSMENT — PATIENT HEALTH QUESTIONNAIRE - PHQ9
2. FEELING DOWN, DEPRESSED OR HOPELESS: NOT AT ALL
SUM OF ALL RESPONSES TO PHQ QUESTIONS 1-9: 0
1. LITTLE INTEREST OR PLEASURE IN DOING THINGS: NOT AT ALL
SUM OF ALL RESPONSES TO PHQ QUESTIONS 1-9: 0

## 2025-06-12 NOTE — PROGRESS NOTES
Have you been to the ER, urgent care clinic since your last visit?  Hospitalized since your last visit?   [facility] ED 5/31/25- Constipation     Have you seen or consulted any other health care providers outside our system since your last visit?   NO                 [facility]  [address] [address]  Dept: [phone]  Dept Fax: [phone]    Office Progress/Follow Up Note  Date of patient's visit: 6/12/2025   Patient's Name:  Trisha Freed  YOB: 1940            Patient Care Team:  Kevin Karimi MD as PCP - General (Internal Medicine)  Kevin Karimi MD as PCP - Empaneled Provider  Jesus Rivera MD as Consulting Physician (Gastroenterology)  Courtney Singh MD as Consulting Physician (Infectious Diseases)    REASON FOR VISIT: ED Follow-up (Constipation )        HISTORY OF PRESENT ILLNESS:      History was obtained from the patient. Trisha Freed is a 84 y.o. is here for ED Follow-up (Constipation )    HPI  Lives with grandson. States she continues to lose weight, is easily fatigued, and wants \"an energy pill\".   Here for follow up of constipation, went to ER and had CT abdomen which showed moderate amount of stool and chronic liver disease.   She reports she has only had 2 bms since that visit on 5/31.   Does not want to take Metamucil anymore, states it gives her diarrhea.    She was referred to oncology last year for lung mass, states she doesn't think she scheduled appt because \"I've been seeing too many doctors\" but did have PET scan on 4/19 which showed:  Enlarging spiculated nodule in the medial left upper lobe which demonstrates  intense uptake is suspicious for primary lung cancer.  Adjacent mild-moderate  prevascular uptake could represent early geovanna metastasis.    She states no one discussed these results with her, and I advised her to follow up with ordering provider Dr Hector and will place new referral to oncology. Stressed

## 2025-07-08 ENCOUNTER — TELEPHONE (OUTPATIENT)
Dept: INTERNAL MEDICINE CLINIC | Age: 85
End: 2025-07-08

## 2025-07-08 NOTE — TELEPHONE ENCOUNTER
Alysia from [facility] called to see if provider would follow. Confirmed. They will confirm the what service the family would like.

## 2025-07-08 NOTE — TELEPHONE ENCOUNTER
Mady from [facility] called to see if provider would follow. Confirmed. They will confirm the what service the family would like.

## 2025-07-10 ENCOUNTER — CARE COORDINATION (OUTPATIENT)
Dept: CARE COORDINATION | Age: 85
End: 2025-07-10

## 2025-07-10 NOTE — CARE COORDINATION
Care Transitions Note    Initial Call - Call within 2 business days of discharge: Yes    Attempted to reach patient for transitions of care follow up. Unable to reach patient.    Outreach Attempts:   HIPAA compliant voicemail left for patient.     Patient: Trisha Freed    Patient : 1940   MRN: [id number]    Reason for Admission: hemoptysis secondary to post bronchoscopy with biopsy and EBUS  Discharge Date: 25  RURS: No data recorded  Last Discharge Facility       Date Complaint Diagnosis Description Type Department Provider    25 Constipation Constipation, unspecified constipation type ED (DISCHARGE) [facility] Phil Beltran MD            Was this an external facility discharge? Yes. Discharge Date: 25. Facility Name: [facility]    Follow Up Appointment:   Patient does not have a follow up appointment scheduled at time of call. Routed to PCP  Future Appointments         Provider Specialty Dept Phone    2025 1:30 PM Jonathan Crocker MD Internal Medicine [phone]            Plan for follow-up on next business day.      Anita Latham RN

## 2025-07-11 ENCOUNTER — CARE COORDINATION (OUTPATIENT)
Dept: CARE COORDINATION | Age: 85
End: 2025-07-11

## 2025-07-11 NOTE — CARE COORDINATION
Care Transitions Note    Initial Call - Call within 2 business days of discharge: Yes    Attempted to reach patient for transitions of care follow up. Unable to reach patient.    Outreach Attempts:   Multiple attempts to contact patient at phone numbers on file.   HIPAA compliant voicemail left for patient.     Patient: Trisha Freed    Patient : 1940   MRN: [id number]    Reason for Admission: hemoptysis secondary to post bronchoscopy with biopsy and EBUS   Discharge Date: 25  RURS: No data recorded  Last Discharge Facility       Date Complaint Diagnosis Description Type Department Provider    25 Constipation Constipation, unspecified constipation type ED (DISCHARGE) [facility] ED Phil Sy MD            Was this an external facility discharge? Yes. Discharge Date: 25. Facility Name: [facility]    Follow Up Appointment:   Patient has hospital follow up appointment scheduled within 14 days of discharge.    Future Appointments         Provider Specialty Dept Phone    2025 1:30 PM Jonathan Crocker MD Internal Medicine [phone]            No further follow-up call indicated     Anita Latham RN

## 2025-07-14 DIAGNOSIS — F41.9 ANXIETY: Primary | ICD-10-CM

## 2025-07-15 RX ORDER — LORAZEPAM 0.5 MG/1
TABLET ORAL
Qty: 30 TABLET | Refills: 3 | Status: SHIPPED | OUTPATIENT
Start: 2025-07-15 | End: 2025-08-14

## 2025-07-15 NOTE — TELEPHONE ENCOUNTER
Patient called is now completely out of medication. Please call patient once medication is called in.

## 2025-07-21 ENCOUNTER — OFFICE VISIT (OUTPATIENT)
Dept: INTERNAL MEDICINE CLINIC | Age: 85
End: 2025-07-21

## 2025-07-21 VITALS
DIASTOLIC BLOOD PRESSURE: 78 MMHG | SYSTOLIC BLOOD PRESSURE: 116 MMHG | WEIGHT: 114.6 LBS | OXYGEN SATURATION: 97 % | BODY MASS INDEX: 17.99 KG/M2 | HEART RATE: 84 BPM | HEIGHT: 67 IN

## 2025-07-21 DIAGNOSIS — R04.2 HEMOPTYSIS: Primary | ICD-10-CM

## 2025-07-21 DIAGNOSIS — Z78.0 POST-MENOPAUSAL: ICD-10-CM

## 2025-07-21 DIAGNOSIS — Z09 HOSPITAL DISCHARGE FOLLOW-UP: ICD-10-CM

## 2025-07-21 DIAGNOSIS — I10 PRIMARY HYPERTENSION: ICD-10-CM

## 2025-07-21 DIAGNOSIS — I48.21 PERMANENT ATRIAL FIBRILLATION (HCC): ICD-10-CM

## 2025-07-21 DIAGNOSIS — K74.60 CIRRHOSIS OF LIVER WITHOUT ASCITES, UNSPECIFIED HEPATIC CIRRHOSIS TYPE (HCC): ICD-10-CM

## 2025-07-21 DIAGNOSIS — I27.20 PULMONARY HTN (HCC): ICD-10-CM

## 2025-07-21 DIAGNOSIS — G51.0 BELL'S PALSY: ICD-10-CM

## 2025-07-21 DIAGNOSIS — C34.10 NON-SMALL CELL CANCER OF UPPER LOBE OF LUNG (HCC): ICD-10-CM

## 2025-07-21 DIAGNOSIS — K59.00 CONSTIPATION, UNSPECIFIED CONSTIPATION TYPE: ICD-10-CM

## 2025-07-21 RX ORDER — ONDANSETRON 4 MG/1
TABLET, ORALLY DISINTEGRATING ORAL
COMMUNITY
Start: 2025-07-18

## 2025-07-21 RX ORDER — FLUTICASONE FUROATE, UMECLIDINIUM BROMIDE AND VILANTEROL TRIFENATATE 100; 62.5; 25 UG/1; UG/1; UG/1
POWDER RESPIRATORY (INHALATION)
COMMUNITY
Start: 2025-01-09

## 2025-07-21 RX ORDER — SENNA AND DOCUSATE SODIUM 50; 8.6 MG/1; MG/1
1 TABLET, FILM COATED ORAL DAILY
Qty: 30 TABLET | Refills: 1 | Status: SHIPPED | OUTPATIENT
Start: 2025-07-21

## 2025-07-21 ASSESSMENT — ENCOUNTER SYMPTOMS
VOMITING: 0
ABDOMINAL PAIN: 0
BACK PAIN: 0
NAUSEA: 0
COUGH: 0
SHORTNESS OF BREATH: 0
WHEEZING: 0

## 2025-07-21 NOTE — PROGRESS NOTES
Have you been to the ER, urgent care clinic since your last visit?  Hospitalized since your last visit?   [facility] 07/05/2025 3:54 PM EDT - 07/09/2025 12:39 PM EDT - Hemoptysis, Malignant neoplasm of lung, unspecified laterality, unspecified part of lung     Have you seen or consulted any other health care providers outside our system since your last visit?   NO

## 2025-07-21 NOTE — PROGRESS NOTES
Post-Discharge Transitional Care Follow Up      Trisha Freed   YOB: 1940    Date of Office Visit:  7/21/2025  Date of Hospital Admission: 5/31/25  Date of Hospital Discharge: 5/31/25  Readmission Risk Score (high >=14%. Medium >=10%):No data recorded    Care management risk score Rising risk (score 2-5) and Complex Care (Scores >=6): No Risk Score On File     Non face to face  following discharge, date last encounter closed (first attempt may have been earlier): 07/11/2025     Call initiated 2 business days of discharge: Yes     Hemoptysis  - Now resolved.  -Secondary to lung mass biopsy.  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  -Scheduled to follow-up with oncology.    Constipation, unspecified constipation type  -     sennosides-docusate sodium (SENOKOT-S) 8.6-50 MG tablet; Take 1 tablet by mouth daily, Disp-30 tablet, R-1Normal  -     TSH reflex to FT4; Future  Primary hypertension  -Well-controlled  Permanent atrial fibrillation (HCC)  Pulmonary HTN (HCC)  Cirrhosis of liver without ascites, unspecified hepatic cirrhosis type (HCC)  Post-menopausal  -     DEXA Bone Density Axial Skeleton; Future  Bell's palsy  Hospital discharge follow-up  -     CO DISCHARGE MEDS RECONCILED W/ CURRENT OUTPATIENT MED LIST      Medical Decision Making: moderate complexity  Return in 3 months (on 10/21/2025).           Subjective:   HPI    Inpatient course: Discharge summary reviewed- see chart.    Interval history/Current status:   Patient was admitted to [facility] for hemoptysis after she underwent upper lymph node biopsy for lung nodule.  Patient denies any hemoptysis today.  Her biopsy results showed non-small cell carcinoma of the left upper lobe.  She is scheduled to see Dr. Marinelli with oncology at [facility].  Denies any shortness of breath, chest pain or cough.    Constipation: Does report having a bowel movement in 4 to 5 days.

## 2025-07-28 ENCOUNTER — RESULTS FOLLOW-UP (OUTPATIENT)
Dept: INTERNAL MEDICINE CLINIC | Age: 85
End: 2025-07-28

## 2025-07-28 ENCOUNTER — HOSPITAL ENCOUNTER (OUTPATIENT)
Age: 85
Discharge: HOME | End: 2025-07-28
Payer: MEDICARE

## 2025-07-28 ENCOUNTER — HOSPITAL ENCOUNTER (OUTPATIENT)
Dept: WOMENS IMAGING | Age: 85
Discharge: HOME | End: 2025-07-30
Payer: MEDICARE

## 2025-07-28 DIAGNOSIS — Z78.0 POST-MENOPAUSAL: ICD-10-CM

## 2025-07-28 DIAGNOSIS — K59.00 CONSTIPATION, UNSPECIFIED CONSTIPATION TYPE: ICD-10-CM

## 2025-07-28 DIAGNOSIS — M81.0 AGE-RELATED OSTEOPOROSIS WITHOUT CURRENT PATHOLOGICAL FRACTURE: Primary | ICD-10-CM

## 2025-07-28 LAB — TSH SERPL DL<=0.05 MIU/L-ACNC: 1.63 UIU/ML (ref 0.27–4.2)

## 2025-07-28 PROCEDURE — 77080 DXA BONE DENSITY AXIAL: CPT

## 2025-07-28 PROCEDURE — 36415 COLL VENOUS BLD VENIPUNCTURE: CPT

## 2025-07-28 PROCEDURE — 84443 ASSAY THYROID STIM HORMONE: CPT

## 2025-07-29 ENCOUNTER — TELEPHONE (OUTPATIENT)
Dept: INTERNAL MEDICINE CLINIC | Age: 85
End: 2025-07-29

## 2025-07-29 DIAGNOSIS — F41.9 ANXIETY: ICD-10-CM

## 2025-07-29 NOTE — TELEPHONE ENCOUNTER
Patient called stating that she only has one Ativan left. Patient states she sometimes has to take 2 a day she was unaware that she was only allowed a max dose of 1 1/2 tablets a day.    She needs a new script sent stating she can take up to 2 a day. Her insurance will not cover a refill unless script is changed.    Please advise if you can send new script to pharmacy.

## 2025-07-30 RX ORDER — LORAZEPAM 0.5 MG/1
0.5 TABLET ORAL 2 TIMES DAILY PRN
Qty: 14 TABLET | Refills: 0 | Status: SHIPPED | OUTPATIENT
Start: 2025-07-30 | End: 2025-08-06

## 2025-07-30 NOTE — TELEPHONE ENCOUNTER
Patient called and is a nervous lesli. She would like to know any suggestions to calm her nerves since she is unable to get her medication refilled until 8/14    Please advise

## 2025-07-30 NOTE — TELEPHONE ENCOUNTER
Chichi, patients daughter called in to explain the situation.   Patient has chemo coming up, eye injections today and generally has a lot of increased anxiety. States she was unaware of dose change so has been taking 2 times a day as before. (1mg total daily)  She has run out and is requesting script to be increased to 2 times a day, or else she will be out of medication until 8/14.     Please review and advise if script change is appropriate.

## 2025-08-05 RX ORDER — ALENDRONATE SODIUM 70 MG/1
70 TABLET ORAL
Qty: 13 TABLET | Refills: 0 | Status: SHIPPED | OUTPATIENT
Start: 2025-08-05

## 2025-08-07 DIAGNOSIS — F41.9 ANXIETY: ICD-10-CM

## 2025-08-07 RX ORDER — LORAZEPAM 0.5 MG/1
0.5 TABLET ORAL 2 TIMES DAILY PRN
Qty: 60 TABLET | Refills: 0 | Status: SHIPPED | OUTPATIENT
Start: 2025-08-07 | End: 2025-09-06

## 2025-08-20 ENCOUNTER — TELEPHONE (OUTPATIENT)
Dept: INTERNAL MEDICINE CLINIC | Age: 85
End: 2025-08-20

## 2025-08-27 ENCOUNTER — TELEPHONE (OUTPATIENT)
Dept: INTERNAL MEDICINE CLINIC | Age: 85
End: 2025-08-27

## 2025-08-27 DIAGNOSIS — K46.9 ABDOMINAL HERNIA WITHOUT OBSTRUCTION AND WITHOUT GANGRENE, RECURRENCE NOT SPECIFIED, UNSPECIFIED HERNIA TYPE: Primary | ICD-10-CM

## 2025-09-02 ENCOUNTER — TELEPHONE (OUTPATIENT)
Dept: INTERNAL MEDICINE CLINIC | Age: 85
End: 2025-09-02

## 2025-09-03 ENCOUNTER — OFFICE VISIT (OUTPATIENT)
Dept: INTERNAL MEDICINE CLINIC | Age: 85
End: 2025-09-03

## 2025-09-03 VITALS
HEIGHT: 67 IN | HEART RATE: 78 BPM | SYSTOLIC BLOOD PRESSURE: 112 MMHG | WEIGHT: 113.2 LBS | BODY MASS INDEX: 17.77 KG/M2 | DIASTOLIC BLOOD PRESSURE: 76 MMHG | OXYGEN SATURATION: 96 %

## 2025-09-03 DIAGNOSIS — R42 DIZZINESS: Primary | ICD-10-CM

## 2025-09-03 DIAGNOSIS — G51.0 BELL'S PALSY: ICD-10-CM

## 2025-09-03 DIAGNOSIS — K59.00 CONSTIPATION, UNSPECIFIED CONSTIPATION TYPE: ICD-10-CM

## 2025-09-03 RX ORDER — SENNA AND DOCUSATE SODIUM 50; 8.6 MG/1; MG/1
2 TABLET, FILM COATED ORAL DAILY
Qty: 60 TABLET | Refills: 1 | Status: SHIPPED | OUTPATIENT
Start: 2025-09-03

## 2025-09-03 RX ORDER — MECLIZINE HYDROCHLORIDE 25 MG/1
25 TABLET ORAL 3 TIMES DAILY PRN
Qty: 60 TABLET | Refills: 0 | Status: SHIPPED | OUTPATIENT
Start: 2025-09-03 | End: 2025-09-23

## 2025-09-03 RX ORDER — LACTULOSE 10 G/15ML
10 SOLUTION ORAL EVERY EVENING
Qty: 473 ML | Refills: 1 | Status: SHIPPED | OUTPATIENT
Start: 2025-09-03 | End: 2025-09-03

## 2025-09-03 RX ORDER — LACTULOSE 10 G/15ML
10 SOLUTION ORAL DAILY
Qty: 473 ML | Refills: 1 | Status: SHIPPED | OUTPATIENT
Start: 2025-09-03

## 2025-09-03 ASSESSMENT — ENCOUNTER SYMPTOMS
SHORTNESS OF BREATH: 0
NAUSEA: 0
CONSTIPATION: 1
ABDOMINAL PAIN: 0
WHEEZING: 0
VOMITING: 0

## (undated) DEVICE — Z DISCONTINUED USE 2424143 ADAPTER O2 SWVL CHRISTMAS TREE GRN

## (undated) DEVICE — DEFENDO AIR WATER SUCTION AND BIOPSY VALVE KIT FOR  OLYMPUS: Brand: DEFENDO AIR/WATER/SUCTION AND BIOPSY VALVE

## (undated) DEVICE — Z INACTIVE USE 2525529 CONNECTOR TBNG FOR O2

## (undated) DEVICE — SINGLE-USE BIOPSY FORCEPS: Brand: RADIAL JAW 4

## (undated) DEVICE — CANNULA NSL AD TBNG L7FT PVC STR NONFLARED PRNG O2 DEL W STD

## (undated) DEVICE — BITEBLOCK 54FR W/ DENT RIM BLOX

## (undated) DEVICE — JELLY,LUBE,STERILE,FLIP TOP,TUBE,2-OZ: Brand: MEDLINE

## (undated) DEVICE — Z DUPLICATE USE 2527422 TUBING O2 STD 7 FT EXTN NO CRUSH VISUAL CNTRST LF

## (undated) DEVICE — GOWN,POLY REINFORCED,LG: Brand: MEDLINE

## (undated) DEVICE — ENDO KIT W/SYRINGE: Brand: MEDLINE INDUSTRIES, INC.

## (undated) DEVICE — GLOVE ORANGE PI 7   MSG9070